# Patient Record
Sex: FEMALE | Race: WHITE | NOT HISPANIC OR LATINO | Employment: OTHER | URBAN - METROPOLITAN AREA
[De-identification: names, ages, dates, MRNs, and addresses within clinical notes are randomized per-mention and may not be internally consistent; named-entity substitution may affect disease eponyms.]

---

## 2017-02-01 ENCOUNTER — LAB VISIT (OUTPATIENT)
Dept: LAB | Facility: HOSPITAL | Age: 32
End: 2017-02-01
Attending: OBSTETRICS & GYNECOLOGY
Payer: MEDICAID

## 2017-02-01 DIAGNOSIS — Z34.81 PRENATAL CARE, SUBSEQUENT PREGNANCY, FIRST TRIMESTER: ICD-10-CM

## 2017-02-01 DIAGNOSIS — Z3A.01 LESS THAN 8 WEEKS GESTATION OF PREGNANCY: Primary | ICD-10-CM

## 2017-02-01 LAB — HCG INTACT+B SERPL-ACNC: 19 MIU/ML

## 2017-02-01 PROCEDURE — 84702 CHORIONIC GONADOTROPIN TEST: CPT

## 2017-02-01 PROCEDURE — 36415 COLL VENOUS BLD VENIPUNCTURE: CPT

## 2017-07-15 ENCOUNTER — NURSE TRIAGE (OUTPATIENT)
Dept: ADMINISTRATIVE | Facility: CLINIC | Age: 32
End: 2017-07-15

## 2017-07-15 NOTE — TELEPHONE ENCOUNTER
Reason for Disposition   Boil > 2 inches across (> 5 cm; larger than a golf ball or ping pong ball)    Answer Assessment - Initial Assessment Questions  Caller states she was seen at [facility] for boil 2 weeks ago.  She has completed antibiotic therapy and boil was lanced .  States there is still lump at the site.  Denies any fever, drainage, severe pain, or redness.  Recommend to be seen in urgent care tomorrow.    Protocols used: ST BOIL (SKIN ABSCESS)-A-

## 2017-07-16 ENCOUNTER — HOSPITAL ENCOUNTER (EMERGENCY)
Facility: HOSPITAL | Age: 32
Discharge: LEFT BEFORE DISCHARGE | End: 2017-07-16
Payer: MEDICAID

## 2017-07-16 ENCOUNTER — HOSPITAL ENCOUNTER (EMERGENCY)
Facility: OTHER | Age: 32
Discharge: HOME | End: 2017-07-16
Attending: INTERNAL MEDICINE
Payer: MEDICAID

## 2017-07-16 ENCOUNTER — NURSE TRIAGE (OUTPATIENT)
Dept: ADMINISTRATIVE | Facility: CLINIC | Age: 32
End: 2017-07-16

## 2017-07-16 VITALS
HEART RATE: 78 BPM | RESPIRATION RATE: 17 BRPM | WEIGHT: 155 LBS | HEIGHT: 62 IN | TEMPERATURE: 98 F | SYSTOLIC BLOOD PRESSURE: 132 MMHG | OXYGEN SATURATION: 98 % | DIASTOLIC BLOOD PRESSURE: 82 MMHG | BODY MASS INDEX: 28.52 KG/M2

## 2017-07-16 VITALS
HEIGHT: 62 IN | WEIGHT: 155 LBS | DIASTOLIC BLOOD PRESSURE: 80 MMHG | OXYGEN SATURATION: 95 % | RESPIRATION RATE: 18 BRPM | HEART RATE: 68 BPM | BODY MASS INDEX: 28.52 KG/M2 | SYSTOLIC BLOOD PRESSURE: 127 MMHG | TEMPERATURE: 98 F

## 2017-07-16 DIAGNOSIS — K08.89 TOOTHACHE: Primary | ICD-10-CM

## 2017-07-16 LAB
B-HCG UR QL: NEGATIVE
CTP QC/QA: YES

## 2017-07-16 PROCEDURE — 99283 EMERGENCY DEPT VISIT LOW MDM: CPT

## 2017-07-16 PROCEDURE — 99900041 HC LEFT WITHOUT BEING SEEN- EMERGENCY

## 2017-07-16 PROCEDURE — 81025 URINE PREGNANCY TEST: CPT | Performed by: EMERGENCY MEDICINE

## 2017-07-16 RX ORDER — IBUPROFEN 800 MG/1
800 TABLET ORAL EVERY 8 HOURS PRN
Qty: 30 TABLET | Refills: 0 | Status: SHIPPED | OUTPATIENT
Start: 2017-07-16 | End: 2018-10-13 | Stop reason: HOSPADM

## 2017-07-16 RX ORDER — AMOXICILLIN 500 MG/1
500 TABLET, FILM COATED ORAL 2 TIMES DAILY
Qty: 20 TABLET | Refills: 0 | Status: SHIPPED | OUTPATIENT
Start: 2017-07-16 | End: 2018-10-12

## 2017-07-16 NOTE — TELEPHONE ENCOUNTER
"    Reason for Disposition   [1] SEVERE pain (e.g., excruciating, unable to do any normal activities) AND [2] not improved 2 hours after pain medicine    Answer Assessment - Initial Assessment Questions  1. LOCATION: "Which tooth is hurting?"  (e.g., right-side/left-side, upper/lower, front/back)      Right upper back  2. ONSET: "When did the toothache start?"  (e.g., hours, days)       A week  3. SEVERITY: "How bad is the toothache?"  (Scale 1-10; mild, moderate or severe)    - MILD (1-3): doesn't interfere with chewing     - MODERATE (4-7): interferes with chewing, interferes with normal activities, awakens from sleep      - SEVERE (8-10): unable to eat, unable to do any normal activities, excruciating pain         Severe 10/10 Ibuprofen   4. SWELLING: "Is there any visible swelling of your face?"      yes  5. OTHER SYMPTOMS: "Do you have any other symptoms?" (e.g., fever)      denies  6. PREGNANCY: "Is there any chance you are pregnant?" "When was your last menstrual period?"      Na    Protocols used: ST TOOTHACHE-A-      "

## 2017-07-16 NOTE — ED PROVIDER NOTES
"Encounter Date: 7/16/2017       History     Chief Complaint   Patient presents with    Dental Pain     right upper molar pain x 1 week, gums swollen and tender around tooth     32-year-old female with right upper molar pain ×1 week      The history is provided by the patient. No  was used.   Dental Pain   The primary symptoms include mouth pain. The symptoms began several days ago. The symptoms are unchanged. The symptoms are new. The symptoms occur frequently.   Mouth pain began 5 - 7 days ago. Mouth pain occurs intermittently. Affected locations include: teeth.     Review of patient's allergies indicates:   Allergen Reactions    Fioricet [butalbital-acetaminophen-caff] Other (See Comments)     Reaction:  "Really bad yeast infections"    Morphine Other (See Comments)     headache    Reglan [metoclopramide] Rash     Past Medical History:   Diagnosis Date    Anxiety     Bladder infection     Carpal tunnel syndrome     Depression     Hypertension     Kidney infection     Pancreatitis     UTI (lower urinary tract infection)      Past Surgical History:   Procedure Laterality Date    CHOLECYSTECTOMY      KNEE SURGERY   aug 2008    right knee    KNEE SURGERY      REDUCTION MAMMAPLASTY      Breast reduction     Family History   Problem Relation Age of Onset    Hypertension Mother     Hypertension Father     Hypertension Maternal Grandmother     Breast cancer Neg Hx     Colon cancer Neg Hx     Ovarian cancer Neg Hx      Social History   Substance Use Topics    Smoking status: Current Every Day Smoker     Packs/day: 1.00     Years: 8.00     Types: Cigarettes    Smokeless tobacco: Never Used      Comment: Pt states she smokes 1 pack of cigarettes in 3 days.    Alcohol use No     Review of Systems   HENT: Positive for dental problem.    All other systems reviewed and are negative.      Physical Exam     Initial Vitals [07/16/17 0351]   BP Pulse Resp Temp SpO2   132/82 78 17 98 °F " (36.7 °C) 98 %      MAP       98.67         Physical Exam    Nursing note and vitals reviewed.  Constitutional: She appears well-developed and well-nourished.   HENT:   Head: Normocephalic.   Mouth/Throat: Dental caries present.   Eyes: Conjunctivae and EOM are normal.   Neck: Normal range of motion.   Cardiovascular: Normal rate and regular rhythm.   Pulmonary/Chest: No respiratory distress.   Abdominal: Soft.   Musculoskeletal: Normal range of motion.   Neurological: She is alert. She has normal strength.   Skin: Skin is warm.   Psychiatric: She has a normal mood and affect.         ED Course   Procedures  Labs Reviewed - No data to display          Medical Decision Making:   Initial Assessment:   32-year-old female with right upper molar pain ×1 week  Differential Diagnosis:   Toothache  ED Management:  Patient given instructions for toothache and a prescription for ibuprofen and amoxicillin                   ED Course     Clinical Impression:   The primary diagnosis is toothache  Disposition:   Disposition: Discharged  Condition: Stable                        Jonathan Ramos MD  07/16/17 0429

## 2017-12-27 ENCOUNTER — HOSPITAL ENCOUNTER (EMERGENCY)
Facility: HOSPITAL | Age: 32
Discharge: HOME | End: 2017-12-28
Attending: EMERGENCY MEDICINE
Payer: MEDICAID

## 2017-12-27 DIAGNOSIS — M54.2 NECK PAIN: ICD-10-CM

## 2017-12-27 DIAGNOSIS — N39.0 URINARY TRACT INFECTION WITHOUT HEMATURIA, SITE UNSPECIFIED: ICD-10-CM

## 2017-12-27 DIAGNOSIS — R51.9 NONINTRACTABLE HEADACHE, UNSPECIFIED CHRONICITY PATTERN, UNSPECIFIED HEADACHE TYPE: Primary | ICD-10-CM

## 2017-12-27 LAB
B-HCG UR QL: NEGATIVE
CTP QC/QA: YES

## 2017-12-27 PROCEDURE — 25000003 PHARM REV CODE 250: Performed by: PHYSICIAN ASSISTANT

## 2017-12-27 PROCEDURE — 96375 TX/PRO/DX INJ NEW DRUG ADDON: CPT

## 2017-12-27 PROCEDURE — 80053 COMPREHEN METABOLIC PANEL: CPT

## 2017-12-27 PROCEDURE — 96372 THER/PROPH/DIAG INJ SC/IM: CPT

## 2017-12-27 PROCEDURE — 96376 TX/PRO/DX INJ SAME DRUG ADON: CPT

## 2017-12-27 PROCEDURE — 81025 URINE PREGNANCY TEST: CPT | Performed by: EMERGENCY MEDICINE

## 2017-12-27 PROCEDURE — 85025 COMPLETE CBC W/AUTO DIFF WBC: CPT

## 2017-12-27 PROCEDURE — 93010 ELECTROCARDIOGRAM REPORT: CPT | Mod: ,,, | Performed by: INTERNAL MEDICINE

## 2017-12-27 PROCEDURE — 63600175 PHARM REV CODE 636 W HCPCS: Performed by: PHYSICIAN ASSISTANT

## 2017-12-27 PROCEDURE — 99284 EMERGENCY DEPT VISIT MOD MDM: CPT | Mod: 25

## 2017-12-27 PROCEDURE — 96374 THER/PROPH/DIAG INJ IV PUSH: CPT

## 2017-12-27 PROCEDURE — 93005 ELECTROCARDIOGRAM TRACING: CPT

## 2017-12-27 PROCEDURE — 84484 ASSAY OF TROPONIN QUANT: CPT

## 2017-12-27 PROCEDURE — 96361 HYDRATE IV INFUSION ADD-ON: CPT

## 2017-12-27 RX ORDER — DIPHENHYDRAMINE HYDROCHLORIDE 50 MG/ML
25 INJECTION INTRAMUSCULAR; INTRAVENOUS
Status: COMPLETED | OUTPATIENT
Start: 2017-12-27 | End: 2017-12-27

## 2017-12-27 RX ORDER — PROCHLORPERAZINE EDISYLATE 5 MG/ML
10 INJECTION INTRAMUSCULAR; INTRAVENOUS
Status: COMPLETED | OUTPATIENT
Start: 2017-12-27 | End: 2017-12-27

## 2017-12-27 RX ORDER — KETOROLAC TROMETHAMINE 30 MG/ML
15 INJECTION, SOLUTION INTRAMUSCULAR; INTRAVENOUS
Status: COMPLETED | OUTPATIENT
Start: 2017-12-27 | End: 2017-12-27

## 2017-12-27 RX ADMIN — KETOROLAC TROMETHAMINE 15 MG: 30 INJECTION, SOLUTION INTRAMUSCULAR at 11:12

## 2017-12-27 RX ADMIN — DIPHENHYDRAMINE HYDROCHLORIDE 25 MG: 50 INJECTION, SOLUTION INTRAMUSCULAR; INTRAVENOUS at 11:12

## 2017-12-27 RX ADMIN — PROCHLORPERAZINE EDISYLATE 10 MG: 5 INJECTION INTRAMUSCULAR; INTRAVENOUS at 11:12

## 2017-12-27 RX ADMIN — SODIUM CHLORIDE 1000 ML: 0.9 INJECTION, SOLUTION INTRAVENOUS at 11:12

## 2017-12-28 VITALS
TEMPERATURE: 98 F | DIASTOLIC BLOOD PRESSURE: 86 MMHG | WEIGHT: 152 LBS | HEART RATE: 66 BPM | OXYGEN SATURATION: 98 % | RESPIRATION RATE: 18 BRPM | HEIGHT: 62 IN | SYSTOLIC BLOOD PRESSURE: 143 MMHG | BODY MASS INDEX: 27.97 KG/M2

## 2017-12-28 LAB
ALBUMIN SERPL BCP-MCNC: 4.3 G/DL
ALP SERPL-CCNC: 83 U/L
ALT SERPL W/O P-5'-P-CCNC: 43 U/L
ANION GAP SERPL CALC-SCNC: 11 MMOL/L
AST SERPL-CCNC: 30 U/L
BACTERIA #/AREA URNS HPF: ABNORMAL /HPF
BASOPHILS # BLD AUTO: 0.03 K/UL
BASOPHILS NFR BLD: 0.3 %
BILIRUB SERPL-MCNC: 0.3 MG/DL
BILIRUB UR QL STRIP: NEGATIVE
BUN SERPL-MCNC: 15 MG/DL
CALCIUM SERPL-MCNC: 10.2 MG/DL
CHLORIDE SERPL-SCNC: 102 MMOL/L
CLARITY UR: ABNORMAL
CO2 SERPL-SCNC: 24 MMOL/L
COLOR UR: YELLOW
CREAT SERPL-MCNC: 0.8 MG/DL
DIFFERENTIAL METHOD: ABNORMAL
EOSINOPHIL # BLD AUTO: 0.3 K/UL
EOSINOPHIL NFR BLD: 2.9 %
ERYTHROCYTE [DISTWIDTH] IN BLOOD BY AUTOMATED COUNT: 12.4 %
EST. GFR  (AFRICAN AMERICAN): >60 ML/MIN/1.73 M^2
EST. GFR  (NON AFRICAN AMERICAN): >60 ML/MIN/1.73 M^2
GLUCOSE SERPL-MCNC: 113 MG/DL
GLUCOSE UR QL STRIP: NEGATIVE
HCT VFR BLD AUTO: 41.5 %
HGB BLD-MCNC: 14.5 G/DL
HGB UR QL STRIP: NEGATIVE
KETONES UR QL STRIP: NEGATIVE
LEUKOCYTE ESTERASE UR QL STRIP: NEGATIVE
LYMPHOCYTES # BLD AUTO: 4.9 K/UL
LYMPHOCYTES NFR BLD: 42.9 %
MCH RBC QN AUTO: 31.1 PG
MCHC RBC AUTO-ENTMCNC: 34.9 G/DL
MCV RBC AUTO: 89 FL
MICROSCOPIC COMMENT: ABNORMAL
MONOCYTES # BLD AUTO: 0.6 K/UL
MONOCYTES NFR BLD: 5.2 %
NEUTROPHILS # BLD AUTO: 5.6 K/UL
NEUTROPHILS NFR BLD: 48.7 %
NITRITE UR QL STRIP: POSITIVE
PH UR STRIP: 5 [PH] (ref 5–8)
PLATELET # BLD AUTO: 283 K/UL
PMV BLD AUTO: 8.7 FL
POTASSIUM SERPL-SCNC: 3.9 MMOL/L
PROT SERPL-MCNC: 8 G/DL
PROT UR QL STRIP: NEGATIVE
RBC # BLD AUTO: 4.66 M/UL
RBC #/AREA URNS HPF: 1 /HPF (ref 0–4)
SODIUM SERPL-SCNC: 137 MMOL/L
SP GR UR STRIP: 1.01 (ref 1–1.03)
SQUAMOUS #/AREA URNS HPF: ABNORMAL /HPF
TROPONIN I SERPL DL<=0.01 NG/ML-MCNC: <0.006 NG/ML
URN SPEC COLLECT METH UR: ABNORMAL
UROBILINOGEN UR STRIP-ACNC: NEGATIVE EU/DL
WBC # BLD AUTO: 11.44 K/UL
WBC #/AREA URNS HPF: 2 /HPF (ref 0–5)
WBC CLUMPS URNS QL MICRO: ABNORMAL

## 2017-12-28 PROCEDURE — 63600175 PHARM REV CODE 636 W HCPCS: Performed by: PHYSICIAN ASSISTANT

## 2017-12-28 PROCEDURE — 87077 CULTURE AEROBIC IDENTIFY: CPT

## 2017-12-28 PROCEDURE — 81000 URINALYSIS NONAUTO W/SCOPE: CPT

## 2017-12-28 PROCEDURE — 87088 URINE BACTERIA CULTURE: CPT

## 2017-12-28 PROCEDURE — 87186 SC STD MICRODIL/AGAR DIL: CPT

## 2017-12-28 PROCEDURE — 87086 URINE CULTURE/COLONY COUNT: CPT

## 2017-12-28 RX ORDER — ETODOLAC 200 MG/1
200 CAPSULE ORAL 3 TIMES DAILY PRN
Qty: 20 CAPSULE | Refills: 0 | Status: SHIPPED | OUTPATIENT
Start: 2017-12-28 | End: 2018-01-04

## 2017-12-28 RX ORDER — CEFTRIAXONE 1 G/1
1 INJECTION, POWDER, FOR SOLUTION INTRAMUSCULAR; INTRAVENOUS
Status: COMPLETED | OUTPATIENT
Start: 2017-12-28 | End: 2017-12-28

## 2017-12-28 RX ORDER — CEPHALEXIN 500 MG/1
500 CAPSULE ORAL EVERY 12 HOURS
Qty: 20 CAPSULE | Refills: 0 | Status: SHIPPED | OUTPATIENT
Start: 2017-12-28 | End: 2018-01-07

## 2017-12-28 RX ORDER — ONDANSETRON 4 MG/1
4 TABLET, ORALLY DISINTEGRATING ORAL EVERY 6 HOURS PRN
Qty: 15 TABLET | Refills: 0 | Status: SHIPPED | OUTPATIENT
Start: 2017-12-28 | End: 2018-01-02

## 2017-12-28 RX ORDER — KETOROLAC TROMETHAMINE 30 MG/ML
15 INJECTION, SOLUTION INTRAMUSCULAR; INTRAVENOUS
Status: COMPLETED | OUTPATIENT
Start: 2017-12-28 | End: 2017-12-28

## 2017-12-28 RX ADMIN — KETOROLAC TROMETHAMINE 15 MG: 30 INJECTION, SOLUTION INTRAMUSCULAR at 01:12

## 2017-12-28 RX ADMIN — CEFTRIAXONE SODIUM 1 G: 1 INJECTION, POWDER, FOR SOLUTION INTRAMUSCULAR; INTRAVENOUS at 01:12

## 2017-12-28 NOTE — DISCHARGE INSTRUCTIONS
Please take Lodine as prescribed for headache and Zofran for nausea.     Take full course of Keflex as prescribed for UTI.     Follow up with primary care and Neurology.     Return to ER for worsening symptoms, new symptoms or as needed.

## 2017-12-28 NOTE — ED PROVIDER NOTES
"Encounter Date: 12/27/2017    SCRIBE #1 NOTE: I, Zainab Goldstein, am scribing for, and in the presence of,  Yaima Newton PA-C. I have scribed the following portions of the note - Other sections scribed: HPI.       History     Chief Complaint   Patient presents with    Headache     pt reports having "migrane" since yesterday; pt also c/o pain to the base of the neck and "in between the shoulder blades"; pt reports hx of migranes and states that she took Tylenol around 2pm today     CC: Headache    HPI: This 32 y.o. female with a medical history of HTN and migraine headaches presents to the ED c/o an acute generalized headache with associated photophobia and phonophobia that began at about 1:00 pm yesterday. Pt reports that the symptoms feel "like somebodies squeezing my head" as she describes the pain as constant and severe (9/10). She states that the symptoms are similar to migraine headaches that she has experienced in the past, but notes that the present headache is accompanied by posterior neck pain, left sided neck stiffness, pain between the bilateral shoulder blades and several episodes of emesis, which is unusual for her. She reports taking Ibuprofen as well as Tylenol (taken at 2:00 pm today) for treatment. Pt notes that she is followed by her PCP, Dr. Kyle Rivera, for the migraine headaches and adds that she is allergic to many migraine medications. She reports that she is also experiencing "a little" non-productive cough (yesterday). Pt denies head trauma, fever, chills, nasal congestion, ear pain, sore throat, chest pain, shortness of breath and diarrhea. She also denies history of neck issues or injuries. No other associated symptoms.       The history is provided by the patient. No  was used.     Review of patient's allergies indicates:   Allergen Reactions    Fioricet [butalbital-acetaminophen-caff] Other (See Comments)     Reaction:  "Really bad yeast infections"    " Morphine Other (See Comments)     headache    Reglan [metoclopramide] Rash     Past Medical History:   Diagnosis Date    Anxiety     Bladder infection     Carpal tunnel syndrome     Depression     Hypertension     Kidney infection     Pancreatitis     UTI (lower urinary tract infection)      Past Surgical History:   Procedure Laterality Date    CHOLECYSTECTOMY      KNEE SURGERY   aug 2008    right knee    KNEE SURGERY      REDUCTION MAMMAPLASTY      Breast reduction     Family History   Problem Relation Age of Onset    Hypertension Mother     Hypertension Father     Hypertension Maternal Grandmother     Breast cancer Neg Hx     Colon cancer Neg Hx     Ovarian cancer Neg Hx      Social History   Substance Use Topics    Smoking status: Current Every Day Smoker     Packs/day: 1.00     Years: 8.00     Types: Cigarettes    Smokeless tobacco: Never Used      Comment: Pt states she smokes 1 pack of cigarettes in 3 days.    Alcohol use No     Review of Systems   Constitutional: Negative for chills and fever.   HENT: Negative for congestion, ear pain, rhinorrhea and sore throat.         (+) phonophobia   Eyes: Positive for photophobia.   Respiratory: Positive for cough. Negative for shortness of breath.    Cardiovascular: Negative for chest pain.   Gastrointestinal: Positive for nausea and vomiting. Negative for abdominal pain and diarrhea.   Musculoskeletal: Positive for neck pain (posterior) and neck stiffness (left sided).        (+) pain between the bilateral shoulder blades   Skin: Negative for rash.   Neurological: Positive for headaches.       Physical Exam     Initial Vitals [12/27/17 2032]   BP Pulse Resp Temp SpO2   (!) 145/102 108 16 98.1 °F (36.7 °C) 98 %      MAP       116.33         Physical Exam    Nursing note and vitals reviewed.  Constitutional: She appears well-developed and well-nourished.   HENT:   Head: Normocephalic.   Right Ear: External ear normal.   Left Ear: External ear  normal.   Nose: Nose normal.   Mouth/Throat: Oropharynx is clear and moist.   Eyes: Conjunctivae are normal.   Neck: Neck supple. No Brudzinski's sign and no Kernig's sign noted.   TTP to left paraspinal cervical musculature   Cardiovascular: Normal rate and regular rhythm. Exam reveals no gallop and no friction rub.    No murmur heard.  Pulmonary/Chest: Breath sounds normal. She has no wheezes. She has no rhonchi. She has no rales.   Abdominal: Soft. Bowel sounds are normal. She exhibits no distension. There is no tenderness. There is no rebound, no guarding, no tenderness at McBurney's point and negative Marrero's sign.   Musculoskeletal: Normal range of motion.   Lymphadenopathy:     She has no cervical adenopathy.   Neurological: She is alert. She has normal strength. No cranial nerve deficit or sensory deficit. She displays a negative Romberg sign. Gait normal.   Skin: Skin is warm and dry.   Psychiatric: She has a normal mood and affect.         ED Course   Procedures  Labs Reviewed   CBC W/ AUTO DIFFERENTIAL - Abnormal; Notable for the following:        Result Value    MCH 31.1 (*)     MPV 8.7 (*)     Lymph # 4.9 (*)     All other components within normal limits   COMPREHENSIVE METABOLIC PANEL - Abnormal; Notable for the following:     Glucose 113 (*)     All other components within normal limits   URINALYSIS - Abnormal; Notable for the following:     Appearance, UA Hazy (*)     Nitrite, UA Positive (*)     All other components within normal limits   URINALYSIS MICROSCOPIC - Abnormal; Notable for the following:     WBC Clumps, UA Occasional (*)     Bacteria, UA Many (*)     All other components within normal limits   CULTURE, URINE   TROPONIN I   POCT URINE PREGNANCY             Medical Decision Making:   Initial Assessment:   Patient is a 32-year-old female with history of migraine headache managed by primary care, Dr. Rivera, who presents for evaluation of constant generalized headache with associated  posterior neck pain radiating between her shoulder blades, NV, photophobia and phonophobia since yesterday at 1 PM.  Patient denies trauma.  Patient is not similar to her normal migraine headaches due to severity, duration and presence of neck pain and pain between shoulder blades.Patient reports she has not attempted any treatment for her symptoms other than Tylenol.  However, upon physical exam, I asked patient about remaining adhesive on her stomach from what appeared to be from previous EKG.  Patient admits she was seen at [facility] yesterday and given Zofran and IV fluids without relief of her headache.  Patient is afebrile, well-appearing in no acute distress.  Physical exam findings detailed above.  There are no menin geal signs.  No focal neurologic deficits.  Considered but doubt intracranial bleed or mass. Cardiac workup unremarkable. Patient was given IV Benadryl, Toradol and Compazine in the ED.   Patient also has UTI and given Rocephin in the ED.  Urine culture sent.  Patient discharged home in stable condition with Keflex for UTI, Lodine and Zofran for symptomatic treatment of her headache.  PCP and neurology follow-up.  ER return precautions discussed with worsening symptoms, new symptoms or as needed.  Discussed this patient with Dr. Mitchell who agrees with the assessment and plan.              Scribe Attestation:   Scribe #1: I performed the above scribed service and the documentation accurately describes the services I performed. I attest to the accuracy of the note.    Attending Attestation:           Physician Attestation for Scribe:  Physician Attestation Statement for Scribe #1: I, Yaima Newton PA-C, reviewed documentation, as scribed by Zainab Goldstein in my presence, and it is both accurate and complete.                 ED Course      Clinical Impression:   The primary encounter diagnosis was Nonintractable headache, unspecified chronicity pattern, unspecified headache  type. Diagnoses of Neck pain and Urinary tract infection without hematuria, site unspecified were also pertinent to this visit.                           Yaima Newton PA-C  12/28/17 2009

## 2017-12-28 NOTE — ED TRIAGE NOTES
Pt resents to Ed with c/o migrane and vomiting since yesterday. Reports taking tylenol with no relief.

## 2017-12-30 LAB — BACTERIA UR CULT: NORMAL

## 2018-03-05 ENCOUNTER — HOSPITAL ENCOUNTER (EMERGENCY)
Facility: HOSPITAL | Age: 33
Discharge: HOME | End: 2018-03-05
Attending: EMERGENCY MEDICINE
Payer: MEDICAID

## 2018-03-05 VITALS
RESPIRATION RATE: 18 BRPM | DIASTOLIC BLOOD PRESSURE: 62 MMHG | HEIGHT: 62 IN | BODY MASS INDEX: 28.52 KG/M2 | SYSTOLIC BLOOD PRESSURE: 122 MMHG | WEIGHT: 155 LBS | TEMPERATURE: 98 F | HEART RATE: 76 BPM | OXYGEN SATURATION: 99 %

## 2018-03-05 DIAGNOSIS — R10.13 EPIGASTRIC PAIN: ICD-10-CM

## 2018-03-05 DIAGNOSIS — R51.9 NONINTRACTABLE HEADACHE, UNSPECIFIED CHRONICITY PATTERN, UNSPECIFIED HEADACHE TYPE: Primary | ICD-10-CM

## 2018-03-05 LAB
ALBUMIN SERPL BCP-MCNC: 3.9 G/DL
ALP SERPL-CCNC: 99 U/L
ALT SERPL W/O P-5'-P-CCNC: 31 U/L
ANION GAP SERPL CALC-SCNC: 14 MMOL/L
AST SERPL-CCNC: 24 U/L
B-HCG UR QL: NEGATIVE
BACTERIA #/AREA URNS HPF: ABNORMAL /HPF
BASOPHILS # BLD AUTO: 0.09 K/UL
BASOPHILS NFR BLD: 0.6 %
BILIRUB SERPL-MCNC: 0.2 MG/DL
BILIRUB UR QL STRIP: NEGATIVE
BUN SERPL-MCNC: 15 MG/DL
CALCIUM SERPL-MCNC: 9.3 MG/DL
CHLORIDE SERPL-SCNC: 103 MMOL/L
CLARITY UR: ABNORMAL
CO2 SERPL-SCNC: 21 MMOL/L
COLOR UR: YELLOW
CREAT SERPL-MCNC: 0.8 MG/DL
CTP QC/QA: YES
DIFFERENTIAL METHOD: ABNORMAL
EOSINOPHIL # BLD AUTO: 0.3 K/UL
EOSINOPHIL NFR BLD: 1.7 %
ERYTHROCYTE [DISTWIDTH] IN BLOOD BY AUTOMATED COUNT: 12.5 %
EST. GFR  (AFRICAN AMERICAN): >60 ML/MIN/1.73 M^2
EST. GFR  (NON AFRICAN AMERICAN): >60 ML/MIN/1.73 M^2
GLUCOSE SERPL-MCNC: 125 MG/DL
GLUCOSE UR QL STRIP: NEGATIVE
HCT VFR BLD AUTO: 42.1 %
HGB BLD-MCNC: 14.6 G/DL
HGB UR QL STRIP: ABNORMAL
HYALINE CASTS #/AREA URNS LPF: 4 /LPF
KETONES UR QL STRIP: NEGATIVE
LEUKOCYTE ESTERASE UR QL STRIP: NEGATIVE
LIPASE SERPL-CCNC: 8 U/L
LYMPHOCYTES # BLD AUTO: 5 K/UL
LYMPHOCYTES NFR BLD: 30.9 %
MCH RBC QN AUTO: 31.1 PG
MCHC RBC AUTO-ENTMCNC: 34.7 G/DL
MCV RBC AUTO: 90 FL
MICROSCOPIC COMMENT: ABNORMAL
MONOCYTES # BLD AUTO: 1.3 K/UL
MONOCYTES NFR BLD: 8.1 %
NEUTROPHILS # BLD AUTO: 9.4 K/UL
NEUTROPHILS NFR BLD: 57.6 %
NITRITE UR QL STRIP: NEGATIVE
OTHER ELEMENTS URNS MICRO: ABNORMAL
PH UR STRIP: 5 [PH] (ref 5–8)
PLATELET # BLD AUTO: 310 K/UL
PMV BLD AUTO: 9.1 FL
POTASSIUM SERPL-SCNC: 3.9 MMOL/L
PROT SERPL-MCNC: 7.9 G/DL
PROT UR QL STRIP: ABNORMAL
RBC # BLD AUTO: 4.69 M/UL
RBC #/AREA URNS HPF: 3 /HPF (ref 0–4)
SODIUM SERPL-SCNC: 138 MMOL/L
SP GR UR STRIP: 1.03 (ref 1–1.03)
URN SPEC COLLECT METH UR: ABNORMAL
UROBILINOGEN UR STRIP-ACNC: NEGATIVE EU/DL
WBC # BLD AUTO: 16.29 K/UL
WBC #/AREA URNS HPF: 2 /HPF (ref 0–5)

## 2018-03-05 PROCEDURE — 63600175 PHARM REV CODE 636 W HCPCS: Performed by: PHYSICIAN ASSISTANT

## 2018-03-05 PROCEDURE — 96375 TX/PRO/DX INJ NEW DRUG ADDON: CPT

## 2018-03-05 PROCEDURE — 80053 COMPREHEN METABOLIC PANEL: CPT

## 2018-03-05 PROCEDURE — 85025 COMPLETE CBC W/AUTO DIFF WBC: CPT

## 2018-03-05 PROCEDURE — 99284 EMERGENCY DEPT VISIT MOD MDM: CPT | Mod: 25

## 2018-03-05 PROCEDURE — 25000003 PHARM REV CODE 250: Performed by: PHYSICIAN ASSISTANT

## 2018-03-05 PROCEDURE — 81025 URINE PREGNANCY TEST: CPT | Performed by: PHYSICIAN ASSISTANT

## 2018-03-05 PROCEDURE — 96376 TX/PRO/DX INJ SAME DRUG ADON: CPT

## 2018-03-05 PROCEDURE — 96374 THER/PROPH/DIAG INJ IV PUSH: CPT

## 2018-03-05 PROCEDURE — 87086 URINE CULTURE/COLONY COUNT: CPT

## 2018-03-05 PROCEDURE — 96361 HYDRATE IV INFUSION ADD-ON: CPT

## 2018-03-05 PROCEDURE — 83690 ASSAY OF LIPASE: CPT

## 2018-03-05 PROCEDURE — 81000 URINALYSIS NONAUTO W/SCOPE: CPT

## 2018-03-05 RX ORDER — HYDROMORPHONE HYDROCHLORIDE 1 MG/ML
0.5 INJECTION, SOLUTION INTRAMUSCULAR; INTRAVENOUS; SUBCUTANEOUS
Status: COMPLETED | OUTPATIENT
Start: 2018-03-05 | End: 2018-03-05

## 2018-03-05 RX ORDER — DIPHENHYDRAMINE HYDROCHLORIDE 50 MG/ML
25 INJECTION INTRAMUSCULAR; INTRAVENOUS
Status: COMPLETED | OUTPATIENT
Start: 2018-03-05 | End: 2018-03-05

## 2018-03-05 RX ORDER — ONDANSETRON 2 MG/ML
4 INJECTION INTRAMUSCULAR; INTRAVENOUS
Status: COMPLETED | OUTPATIENT
Start: 2018-03-05 | End: 2018-03-05

## 2018-03-05 RX ORDER — ONDANSETRON 4 MG/1
4 TABLET, ORALLY DISINTEGRATING ORAL EVERY 12 HOURS PRN
Qty: 10 TABLET | Refills: 0 | Status: SHIPPED | OUTPATIENT
Start: 2018-03-05 | End: 2018-10-12

## 2018-03-05 RX ORDER — DIPHENHYDRAMINE HCL 25 MG
25 CAPSULE ORAL EVERY 6 HOURS PRN
Qty: 20 CAPSULE | Refills: 0 | COMMUNITY
Start: 2018-03-05 | End: 2019-06-22

## 2018-03-05 RX ORDER — PROCHLORPERAZINE EDISYLATE 5 MG/ML
10 INJECTION INTRAMUSCULAR; INTRAVENOUS
Status: COMPLETED | OUTPATIENT
Start: 2018-03-05 | End: 2018-03-05

## 2018-03-05 RX ORDER — KETOROLAC TROMETHAMINE 30 MG/ML
10 INJECTION, SOLUTION INTRAMUSCULAR; INTRAVENOUS
Status: COMPLETED | OUTPATIENT
Start: 2018-03-05 | End: 2018-03-05

## 2018-03-05 RX ORDER — PANTOPRAZOLE SODIUM 40 MG/1
40 TABLET, DELAYED RELEASE ORAL DAILY
Qty: 30 TABLET | Refills: 0 | Status: SHIPPED | OUTPATIENT
Start: 2018-03-05 | End: 2018-11-23 | Stop reason: ALTCHOICE

## 2018-03-05 RX ORDER — PROCHLORPERAZINE MALEATE 10 MG
10 TABLET ORAL EVERY 6 HOURS PRN
Qty: 15 TABLET | Refills: 0 | Status: SHIPPED | OUTPATIENT
Start: 2018-03-05 | End: 2018-10-12

## 2018-03-05 RX ADMIN — KETOROLAC TROMETHAMINE 10 MG: 30 INJECTION, SOLUTION INTRAMUSCULAR at 03:03

## 2018-03-05 RX ADMIN — ONDANSETRON HYDROCHLORIDE 4 MG: 2 INJECTION, SOLUTION INTRAMUSCULAR; INTRAVENOUS at 03:03

## 2018-03-05 RX ADMIN — KETOROLAC TROMETHAMINE 10 MG: 30 INJECTION, SOLUTION INTRAMUSCULAR at 04:03

## 2018-03-05 RX ADMIN — PROCHLORPERAZINE EDISYLATE 10 MG: 5 INJECTION INTRAMUSCULAR; INTRAVENOUS at 03:03

## 2018-03-05 RX ADMIN — DIPHENHYDRAMINE HYDROCHLORIDE 25 MG: 50 INJECTION, SOLUTION INTRAMUSCULAR; INTRAVENOUS at 03:03

## 2018-03-05 RX ADMIN — SODIUM CHLORIDE 1000 ML: 0.9 INJECTION, SOLUTION INTRAVENOUS at 03:03

## 2018-03-05 RX ADMIN — Medication 0.5 MG: at 04:03

## 2018-03-05 NOTE — ED PROVIDER NOTES
"Encounter Date: 3/5/2018       History     Chief Complaint   Patient presents with    Headache     headache for last two days, pt unable to sleep and called ems     This is a 33-year-old female with history of HTN, anxiety, migraines, gastroparesis, pancreatitis, cholecystitis with cholecystectomy who presents with headache ×4 days, and epigastric pain intermittently ×2 weeks with nausea vomiting.  She explains her epigastric pain has been intermittent until the last 4 days it has been constant.  It is not worsened by food or change in position.  She denies diarrhea or change in stool color.  She explains she's had pancreatitis before, possibly secondary to gallstones, and that her epigastric pain feels very similar.  She does not have a gallbladder anymore.  She denies alcohol intake or recent medication change.  Her headache is frontal bilaterally, constant, and nonradiating.  She reports blurry vision at times, but denies neck stiffness, syncope, fever.  Her headache is the same character and location as previous migraine headaches.  She has been taking Aleve with little relief.          Review of patient's allergies indicates:   Allergen Reactions    Fioricet [butalbital-acetaminophen-caff] Other (See Comments)     Reaction:  "Really bad yeast infections"    Reglan [metoclopramide] Rash     Past Medical History:   Diagnosis Date    Anxiety     Bladder infection     Carpal tunnel syndrome     Depression     Hypertension     Pancreatitis     UTI (lower urinary tract infection)      Past Surgical History:   Procedure Laterality Date    CHOLECYSTECTOMY      KNEE SURGERY   aug 2008    right knee    KNEE SURGERY      REDUCTION MAMMAPLASTY      Breast reduction     Family History   Problem Relation Age of Onset    Hypertension Mother     Hypertension Father     Hypertension Maternal Grandmother     Breast cancer Neg Hx     Colon cancer Neg Hx     Ovarian cancer Neg Hx      Social History   Substance " Use Topics    Smoking status: Current Every Day Smoker     Packs/day: 1.00     Years: 8.00     Types: Cigarettes    Smokeless tobacco: Never Used      Comment: Pt states she smokes 1 pack of cigarettes in 3 days.    Alcohol use No     Review of Systems   Constitutional: Negative for fever.   HENT: Negative for hearing loss and sore throat.    Eyes: Positive for visual disturbance.   Respiratory: Negative for cough and shortness of breath.    Cardiovascular: Negative for chest pain.   Gastrointestinal: Positive for abdominal pain, nausea and vomiting. Negative for blood in stool and diarrhea.   Genitourinary: Negative for dysuria and vaginal discharge.   Musculoskeletal: Negative for back pain, neck pain and neck stiffness.   Skin: Negative for rash.   Neurological: Positive for headaches. Negative for dizziness, syncope, speech difficulty and weakness.   Hematological: Does not bruise/bleed easily.       Physical Exam     Initial Vitals [03/05/18 0240]   BP Pulse Resp Temp SpO2   (!) 149/101 110 18 98.2 °F (36.8 °C) 100 %      MAP       117         Physical Exam    Vitals reviewed.  Constitutional: She appears well-developed and well-nourished. She is not diaphoretic. No distress.   HENT:   Head: Normocephalic and atraumatic.   Right Ear: External ear normal.   Left Ear: External ear normal.   Nose: Nose normal.   Eyes: Conjunctivae are normal. No scleral icterus.   Neck: Normal range of motion. Neck supple.   Cardiovascular: Regular rhythm, normal heart sounds and intact distal pulses. Tachycardia present.    Pulmonary/Chest: Breath sounds normal. No respiratory distress. She has no wheezes. She has no rhonchi. She has no rales. She exhibits no tenderness.   Abdominal: Soft. She exhibits no distension and no mass. There is tenderness in the epigastric area. There is no rebound, no guarding, no CVA tenderness, no tenderness at McBurney's point and negative Marrero's sign.   Musculoskeletal: Normal range of motion.    Lymphadenopathy:     She has no cervical adenopathy.   Neurological: She is alert and oriented to person, place, and time.   Skin: Skin is warm and dry. No rash noted.         ED Course   Procedures  Labs Reviewed   CBC W/ AUTO DIFFERENTIAL - Abnormal; Notable for the following:        Result Value    WBC 16.29 (*)     MCH 31.1 (*)     MPV 9.1 (*)     Gran # (ANC) 9.4 (*)     Lymph # 5.0 (*)     Mono # 1.3 (*)     All other components within normal limits   COMPREHENSIVE METABOLIC PANEL - Abnormal; Notable for the following:     CO2 21 (*)     Glucose 125 (*)     All other components within normal limits   URINALYSIS - Abnormal; Notable for the following:     Appearance, UA Hazy (*)     Protein, UA 1+ (*)     Occult Blood UA 1+ (*)     All other components within normal limits   URINALYSIS MICROSCOPIC - Abnormal; Notable for the following:     Bacteria, UA Moderate (*)     Hyaline Casts, UA 4 (*)     All other components within normal limits   CULTURE, URINE   LIPASE   POCT URINE PREGNANCY             Medical Decision Making:   Initial Assessment:   33-year-old female with history of hypertension, anxiety, pancreatitis and cholecystectomy presents with epigastric pain ×2 weeks and headache ×4 days.  She denies fever, syncope, neck stiffness.  She presents well-appearing in no distress, slightly tachycardic and hypertensive.  Afebrile.  Cranial nerves are grossly intact.  HEENT exam is unremarkable.  Abdomen is soft with mild epigastric tenderness.  No peritoneal signs.  ED Management:  Headache and epigastric pain significantly improved with treatment. Lipase, LFTs, renal function, H&H within normal limits. Im not certain of the cause of her elevated WBCs. She does not have convincing evidence for UTI, but will send for cx. Low suspicion for serious intra-abdominal process or meningitis. Pt safe for discharge with symptomatic treatment and close f/u with PCP. Return precautions given. She verbalized understanding  and agreed with plan. Case discussed with Dr. Cox.                       Clinical Impression:   The primary encounter diagnosis was Nonintractable headache, unspecified chronicity pattern, unspecified headache type. A diagnosis of Epigastric pain was also pertinent to this visit.                           Maurice Ng PA-C  03/06/18 0905

## 2018-03-05 NOTE — ED TRIAGE NOTES
Patient presents to the ER via EMS. Patient presents with a headache for x 2 days. Epigastric pain x 2 weeks. Reports vomiting since last night. Denies chest pain or diarrhea. 9/10 pain

## 2018-03-07 LAB — BACTERIA UR CULT: NORMAL

## 2018-10-12 ENCOUNTER — HOSPITAL ENCOUNTER (EMERGENCY)
Facility: HOSPITAL | Age: 33
Discharge: HOME | End: 2018-10-13
Attending: EMERGENCY MEDICINE
Payer: MEDICAID

## 2018-10-12 DIAGNOSIS — R51.9 ACUTE NONINTRACTABLE HEADACHE, UNSPECIFIED HEADACHE TYPE: Primary | ICD-10-CM

## 2018-10-12 DIAGNOSIS — R19.7 VOMITING AND DIARRHEA: ICD-10-CM

## 2018-10-12 DIAGNOSIS — R10.13 EPIGASTRIC PAIN: ICD-10-CM

## 2018-10-12 DIAGNOSIS — R11.10 VOMITING AND DIARRHEA: ICD-10-CM

## 2018-10-12 LAB
B-HCG UR QL: NEGATIVE
BACTERIA #/AREA URNS HPF: NORMAL /HPF
BASOPHILS # BLD AUTO: 0.03 K/UL
BASOPHILS NFR BLD: 0.3 %
BILIRUB UR QL STRIP: NEGATIVE
CLARITY UR: CLEAR
COLOR UR: ABNORMAL
CTP QC/QA: YES
DIFFERENTIAL METHOD: ABNORMAL
EOSINOPHIL # BLD AUTO: 0.4 K/UL
EOSINOPHIL NFR BLD: 3.4 %
ERYTHROCYTE [DISTWIDTH] IN BLOOD BY AUTOMATED COUNT: 12.9 %
GLUCOSE UR QL STRIP: NEGATIVE
HCT VFR BLD AUTO: 41.5 %
HGB BLD-MCNC: 14.7 G/DL
HGB UR QL STRIP: ABNORMAL
KETONES UR QL STRIP: NEGATIVE
LEUKOCYTE ESTERASE UR QL STRIP: NEGATIVE
LYMPHOCYTES # BLD AUTO: 4.9 K/UL
LYMPHOCYTES NFR BLD: 42 %
MCH RBC QN AUTO: 31.2 PG
MCHC RBC AUTO-ENTMCNC: 35.4 G/DL
MCV RBC AUTO: 88 FL
MICROSCOPIC COMMENT: NORMAL
MONOCYTES # BLD AUTO: 0.8 K/UL
MONOCYTES NFR BLD: 6.5 %
NEUTROPHILS # BLD AUTO: 5.6 K/UL
NEUTROPHILS NFR BLD: 47.8 %
NITRITE UR QL STRIP: NEGATIVE
PH UR STRIP: 6 [PH] (ref 5–8)
PLATELET # BLD AUTO: 267 K/UL
PMV BLD AUTO: 9 FL
PROT UR QL STRIP: NEGATIVE
RBC # BLD AUTO: 4.71 M/UL
RBC #/AREA URNS HPF: 1 /HPF (ref 0–4)
SP GR UR STRIP: 1 (ref 1–1.03)
SQUAMOUS #/AREA URNS HPF: 5 /HPF
URN SPEC COLLECT METH UR: ABNORMAL
UROBILINOGEN UR STRIP-ACNC: NEGATIVE EU/DL
WBC # BLD AUTO: 11.69 K/UL
WBC #/AREA URNS HPF: 1 /HPF (ref 0–5)

## 2018-10-12 PROCEDURE — 96374 THER/PROPH/DIAG INJ IV PUSH: CPT

## 2018-10-12 PROCEDURE — 63600175 PHARM REV CODE 636 W HCPCS: Performed by: PHYSICIAN ASSISTANT

## 2018-10-12 PROCEDURE — 99284 EMERGENCY DEPT VISIT MOD MDM: CPT

## 2018-10-12 PROCEDURE — 83690 ASSAY OF LIPASE: CPT

## 2018-10-12 PROCEDURE — 81025 URINE PREGNANCY TEST: CPT | Performed by: PHYSICIAN ASSISTANT

## 2018-10-12 PROCEDURE — 81000 URINALYSIS NONAUTO W/SCOPE: CPT

## 2018-10-12 PROCEDURE — 85025 COMPLETE CBC W/AUTO DIFF WBC: CPT

## 2018-10-12 PROCEDURE — 80053 COMPREHEN METABOLIC PANEL: CPT

## 2018-10-12 PROCEDURE — 25000003 PHARM REV CODE 250: Performed by: PHYSICIAN ASSISTANT

## 2018-10-12 PROCEDURE — 96376 TX/PRO/DX INJ SAME DRUG ADON: CPT

## 2018-10-12 PROCEDURE — 96375 TX/PRO/DX INJ NEW DRUG ADDON: CPT

## 2018-10-12 RX ORDER — PROCHLORPERAZINE EDISYLATE 5 MG/ML
10 INJECTION INTRAMUSCULAR; INTRAVENOUS
Status: DISCONTINUED | OUTPATIENT
Start: 2018-10-12 | End: 2018-10-12

## 2018-10-12 RX ORDER — MORPHINE SULFATE 4 MG/ML
4 INJECTION, SOLUTION INTRAMUSCULAR; INTRAVENOUS
Status: DISCONTINUED | OUTPATIENT
Start: 2018-10-12 | End: 2018-10-12

## 2018-10-12 RX ORDER — ONDANSETRON 2 MG/ML
4 INJECTION INTRAMUSCULAR; INTRAVENOUS
Status: COMPLETED | OUTPATIENT
Start: 2018-10-12 | End: 2018-10-12

## 2018-10-12 RX ORDER — DIPHENHYDRAMINE HCL 25 MG
25 CAPSULE ORAL
Status: DISCONTINUED | OUTPATIENT
Start: 2018-10-12 | End: 2018-10-12

## 2018-10-12 RX ORDER — ACETAMINOPHEN 500 MG
500 TABLET ORAL
Status: COMPLETED | OUTPATIENT
Start: 2018-10-12 | End: 2018-10-12

## 2018-10-12 RX ORDER — MORPHINE SULFATE 4 MG/ML
4 INJECTION, SOLUTION INTRAMUSCULAR; INTRAVENOUS
Status: COMPLETED | OUTPATIENT
Start: 2018-10-12 | End: 2018-10-12

## 2018-10-12 RX ORDER — KETOROLAC TROMETHAMINE 30 MG/ML
15 INJECTION, SOLUTION INTRAMUSCULAR; INTRAVENOUS
Status: COMPLETED | OUTPATIENT
Start: 2018-10-12 | End: 2018-10-12

## 2018-10-12 RX ADMIN — ACETAMINOPHEN 500 MG: 500 TABLET, FILM COATED ORAL at 11:10

## 2018-10-12 RX ADMIN — ONDANSETRON HYDROCHLORIDE 4 MG: 2 INJECTION INTRAMUSCULAR; INTRAVENOUS at 11:10

## 2018-10-12 RX ADMIN — KETOROLAC TROMETHAMINE 15 MG: 30 INJECTION, SOLUTION INTRAMUSCULAR at 11:10

## 2018-10-12 RX ADMIN — MORPHINE SULFATE 4 MG: 4 INJECTION INTRAVENOUS at 11:10

## 2018-10-13 VITALS
WEIGHT: 190 LBS | HEIGHT: 62 IN | RESPIRATION RATE: 16 BRPM | BODY MASS INDEX: 34.96 KG/M2 | HEART RATE: 76 BPM | TEMPERATURE: 98 F | DIASTOLIC BLOOD PRESSURE: 90 MMHG | OXYGEN SATURATION: 99 % | SYSTOLIC BLOOD PRESSURE: 158 MMHG

## 2018-10-13 LAB
ALBUMIN SERPL BCP-MCNC: 4.3 G/DL
ALP SERPL-CCNC: 80 U/L
ALT SERPL W/O P-5'-P-CCNC: 32 U/L
ANION GAP SERPL CALC-SCNC: 12 MMOL/L
AST SERPL-CCNC: 41 U/L
BILIRUB SERPL-MCNC: 0.2 MG/DL
BUN SERPL-MCNC: 15 MG/DL
CALCIUM SERPL-MCNC: 10.1 MG/DL
CHLORIDE SERPL-SCNC: 101 MMOL/L
CO2 SERPL-SCNC: 22 MMOL/L
CREAT SERPL-MCNC: 0.9 MG/DL
EST. GFR  (AFRICAN AMERICAN): >60 ML/MIN/1.73 M^2
EST. GFR  (NON AFRICAN AMERICAN): >60 ML/MIN/1.73 M^2
GLUCOSE SERPL-MCNC: 112 MG/DL
LIPASE SERPL-CCNC: 9 U/L
POTASSIUM SERPL-SCNC: 4.4 MMOL/L
PROT SERPL-MCNC: 8.7 G/DL
SODIUM SERPL-SCNC: 135 MMOL/L

## 2018-10-13 RX ORDER — ETODOLAC 200 MG/1
200 CAPSULE ORAL 3 TIMES DAILY PRN
Qty: 20 CAPSULE | Refills: 0 | Status: SHIPPED | OUTPATIENT
Start: 2018-10-13 | End: 2018-10-20

## 2018-10-13 RX ORDER — ACETAMINOPHEN 500 MG
500 TABLET ORAL EVERY 4 HOURS PRN
Qty: 20 TABLET | Refills: 0 | Status: SHIPPED | OUTPATIENT
Start: 2018-10-13 | End: 2018-10-18

## 2018-10-13 RX ORDER — ONDANSETRON 4 MG/1
4 TABLET, ORALLY DISINTEGRATING ORAL EVERY 6 HOURS PRN
Qty: 15 TABLET | Refills: 0 | Status: SHIPPED | OUTPATIENT
Start: 2018-10-13 | End: 2018-10-18

## 2018-10-13 NOTE — DISCHARGE INSTRUCTIONS
Take Lodine, Tylenol and Zofran for your headaches.     Follow up with primary care for high blood pressure and Neurologist for headaches and GI for abdominal pain in 1-2 days.   Return to ER for worsening symptoms or as needed.

## 2018-10-13 NOTE — ED TRIAGE NOTES
Pt c/o headache pain starting at 6:00 pm. Pt reports nvd. Pt reports sensitive to light and blurred vision.

## 2018-10-13 NOTE — ED PROVIDER NOTES
"Encounter Date: 10/12/2018    SCRIBE #1 NOTE: I, Paula Soto, am scribing for, and in the presence of,  Yaima Newton PA-C. I have scribed the following portions of the note - Other sections scribed: HPI and ROS.       History     Chief Complaint   Patient presents with    Headache     pt c/o headache pain starting at 6:00 pm      CC: Headache    HPI: This 33 y.o female who has Anxiety, Pancreatitis, Hypertension, Depression presents to the ED c/o acute, constant headache that began at 0900 today.  Patient reports waking with the headache described as a pounding and pressure, which she reports as being waxing and waning in severity.  Patient also reports of associated photophobia  She also reports having intermittent blurred vision for the past 2 hours. She also reports of a stabbing epigastric abdominal pain with associated nausea, emesis x3-4, and diarrhea x6-7.Patient denies fever, chills, cough, rhinorrhea, rash, back pain, chest pain, shortness of breath, or any other associated symptoms.  No prior tx.  No alleviating factors.  Patient is currently taking propanolol 60mg BID and was recently started on Lisinopril 12.5 mg last week due to uncontrolled blood pressure.  Today she reports a home blood pressure of 167/115 PTA.  No relief with attempting Ibuprofen.  No alleviating factors.        The history is provided by the patient. No  was used.     Review of patient's allergies indicates:   Allergen Reactions    Fioricet [butalbital-acetaminophen-caff] Other (See Comments)     Reaction:  "Really bad yeast infections"    Reglan [metoclopramide] Rash     Past Medical History:   Diagnosis Date    Anxiety     Bladder infection     Carpal tunnel syndrome     Depression     Hypertension     Pancreatitis     UTI (lower urinary tract infection)      Past Surgical History:   Procedure Laterality Date    CHOLECYSTECTOMY      CHOLECYSTECTOMY, LAPAROSCOPIC N/A 12/21/2012    Performed by " [facility] at [facility] OR    KNEE SURGERY   aug 2008    right knee    KNEE SURGERY      REDUCTION MAMMAPLASTY      Breast reduction     Family History   Problem Relation Age of Onset    Hypertension Mother     Hypertension Father     Hypertension Maternal Grandmother     Breast cancer Neg Hx     Colon cancer Neg Hx     Ovarian cancer Neg Hx      Social History     Tobacco Use    Smoking status: Current Every Day Smoker     Packs/day: 1.00     Years: 8.00     Pack years: 8.00     Types: Cigarettes    Smokeless tobacco: Never Used    Tobacco comment: Pt states she smokes 1 pack of cigarettes in 3 days.   Substance Use Topics    Alcohol use: No    Drug use: No     Review of Systems   Constitutional: Negative for fever.   HENT: Positive for ear pain (R). Negative for congestion, hearing loss, rhinorrhea and sore throat.    Eyes: Positive for photophobia and visual disturbance.   Respiratory: Negative for shortness of breath.    Cardiovascular: Negative for chest pain.   Gastrointestinal: Positive for abdominal pain, diarrhea, nausea and vomiting.   Genitourinary: Negative for dysuria, frequency, hematuria and urgency.   Musculoskeletal: Negative for gait problem.   Skin: Negative for rash.   Neurological: Positive for headaches. Negative for dizziness, facial asymmetry, speech difficulty, weakness, light-headedness and numbness.   Psychiatric/Behavioral: Negative for confusion.       Physical Exam     Initial Vitals [10/12/18 2159]   BP Pulse Resp Temp SpO2   (!) 176/98 80 16 98.2 °F (36.8 °C) 98 %      MAP       --         Physical Exam    Nursing note and vitals reviewed.  Constitutional: She appears well-developed and well-nourished.   HENT:   Head: Normocephalic.   Right Ear: Hearing, tympanic membrane, external ear and ear canal normal.   Left Ear: Hearing, tympanic membrane, external ear and ear canal normal.   Nose: Nose normal. No mucosal edema or rhinorrhea. Right sinus exhibits no maxillary  sinus tenderness and no frontal sinus tenderness. Left sinus exhibits no maxillary sinus tenderness and no frontal sinus tenderness.   Mouth/Throat: Oropharynx is clear and moist. No oropharyngeal exudate.   Eyes: Conjunctivae and EOM are normal. Pupils are equal, round, and reactive to light.   Cardiovascular: Normal rate and regular rhythm. Exam reveals no gallop and no friction rub.    No murmur heard.  Pulmonary/Chest: Breath sounds normal. She has no wheezes. She has no rhonchi. She has no rales.   Abdominal: Soft. Bowel sounds are normal. She exhibits no distension. There is tenderness in the epigastric area. There is no rigidity, no rebound, no guarding, no CVA tenderness, no tenderness at McBurney's point and negative Marrero's sign.   Musculoskeletal: Normal range of motion.   Neurological: She is alert. She has normal strength. No cranial nerve deficit or sensory deficit. She displays a negative Romberg sign. Coordination and gait normal.   Skin: Skin is warm and dry.   Psychiatric: She has a normal mood and affect.         ED Course   Procedures  Labs Reviewed   CBC W/ AUTO DIFFERENTIAL - Abnormal; Notable for the following components:       Result Value    MCH 31.2 (*)     MPV 9.0 (*)     Lymph # 4.9 (*)     All other components within normal limits   COMPREHENSIVE METABOLIC PANEL - Abnormal; Notable for the following components:    Sodium 135 (*)     CO2 22 (*)     Glucose 112 (*)     Total Protein 8.7 (*)     AST 41 (*)     All other components within normal limits   URINALYSIS, REFLEX TO URINE CULTURE - Abnormal; Notable for the following components:    Occult Blood UA 1+ (*)     All other components within normal limits    Narrative:     Preferred Collection Type->Urine, Clean Catch   LIPASE   URINALYSIS MICROSCOPIC    Narrative:     Preferred Collection Type->Urine, Clean Catch   POCT URINE PREGNANCY          Imaging Results    None          Medical Decision Making:   Initial Assessment:   Pt is a 33  "y/o female with history of HTN propranolol 60 mg BID (added lisinopril 12.5 mg started last week) states her BP running 167/115 mmhg. Pt is c/o generalized HA "pounding and pressure" waxing and waning in severity+ photophobia. Reports bilaterally blurry vision intermittently x 2 hours. Started this morning 0900, worsened at 1800. States that she feels that this is similar to HAs she has had previosuly with elevated BP.  Denies similar to migraine HA, trauma, weakness, paresthesias, confusion, dizziness, lightheadedness, CP, SOB. /98. No focal neurologic deficits. Considered but doubt CVA, meningitis. Pt statse she is scheduled to follow up with Neurology soon for HAs. Pt given Toradol and zofran with minimal improvement. Morphine and additional zofran ordered.  Pt additionally c/o epigastric pain constant since 1500 today constant "being stabbed" with associated vomiting 3-4x, diarrhea 6-7 x. Mild epigastric TTP. Lipase within normal limits. Pt is s/p cholecystectomy. No abdominal TTP on repeat exam. Doubt bowel obstruction acute surgical abdomen. Pt seen here before for similar symptoms.  Will have her follow up with GI. Return to ER for worsening symptoms or as needed. Discussed pt with Dr. Alonso who agrees with assessment and plan.             Scribe Attestation:   Scribe #1: I performed the above scribed service and the documentation accurately describes the services I performed. I attest to the accuracy of the note.    Attending Attestation:           Physician Attestation for Scribe:  Physician Attestation Statement for Scribe #1: I, Yaima Newton PA-C, reviewed documentation, as scribed by Paula Soto in my presence, and it is both accurate and complete.                    Clinical Impression:   The primary encounter diagnosis was Acute nonintractable headache, unspecified headache type. Diagnoses of Epigastric pain and Vomiting and diarrhea were also pertinent to this visit.       "                       Yaima Newton PA-C  10/13/18 0031       Yaima Newton PA-C  10/13/18 0031

## 2018-10-27 ENCOUNTER — HOSPITAL ENCOUNTER (EMERGENCY)
Facility: HOSPITAL | Age: 33
Discharge: HOME | End: 2018-10-27
Attending: EMERGENCY MEDICINE
Payer: MEDICAID

## 2018-10-27 VITALS
SYSTOLIC BLOOD PRESSURE: 141 MMHG | TEMPERATURE: 98 F | OXYGEN SATURATION: 97 % | DIASTOLIC BLOOD PRESSURE: 86 MMHG | HEART RATE: 91 BPM | HEIGHT: 62 IN | BODY MASS INDEX: 33.13 KG/M2 | RESPIRATION RATE: 18 BRPM | WEIGHT: 180 LBS

## 2018-10-27 DIAGNOSIS — H93.8X1 EAR FULLNESS, RIGHT: ICD-10-CM

## 2018-10-27 DIAGNOSIS — R51.9 RECURRENT HEADACHE: Primary | ICD-10-CM

## 2018-10-27 LAB
B-HCG UR QL: NEGATIVE
BILIRUB UR QL STRIP: NEGATIVE
CLARITY UR: CLEAR
COLOR UR: ABNORMAL
CTP QC/QA: YES
GLUCOSE UR QL STRIP: NEGATIVE
HGB UR QL STRIP: ABNORMAL
KETONES UR QL STRIP: NEGATIVE
LEUKOCYTE ESTERASE UR QL STRIP: NEGATIVE
MICROSCOPIC COMMENT: NORMAL
NITRITE UR QL STRIP: NEGATIVE
PH UR STRIP: 5 [PH] (ref 5–8)
PROT UR QL STRIP: NEGATIVE
RBC #/AREA URNS HPF: 2 /HPF (ref 0–4)
SP GR UR STRIP: 1.01 (ref 1–1.03)
SQUAMOUS #/AREA URNS HPF: 3 /HPF
URN SPEC COLLECT METH UR: ABNORMAL
UROBILINOGEN UR STRIP-ACNC: NEGATIVE EU/DL

## 2018-10-27 PROCEDURE — 99284 EMERGENCY DEPT VISIT MOD MDM: CPT | Mod: 25

## 2018-10-27 PROCEDURE — 81025 URINE PREGNANCY TEST: CPT | Performed by: PHYSICIAN ASSISTANT

## 2018-10-27 PROCEDURE — 25000003 PHARM REV CODE 250: Performed by: NURSE PRACTITIONER

## 2018-10-27 PROCEDURE — 96375 TX/PRO/DX INJ NEW DRUG ADDON: CPT

## 2018-10-27 PROCEDURE — 81000 URINALYSIS NONAUTO W/SCOPE: CPT

## 2018-10-27 PROCEDURE — 96374 THER/PROPH/DIAG INJ IV PUSH: CPT

## 2018-10-27 PROCEDURE — 96361 HYDRATE IV INFUSION ADD-ON: CPT

## 2018-10-27 PROCEDURE — 63600175 PHARM REV CODE 636 W HCPCS: Performed by: NURSE PRACTITIONER

## 2018-10-27 RX ORDER — NAPROXEN 500 MG/1
500 TABLET ORAL 2 TIMES DAILY PRN
Qty: 10 TABLET | Refills: 0 | Status: SHIPPED | OUTPATIENT
Start: 2018-10-27 | End: 2018-11-01

## 2018-10-27 RX ORDER — ACETAMINOPHEN 325 MG/1
650 TABLET ORAL
Status: COMPLETED | OUTPATIENT
Start: 2018-10-27 | End: 2018-10-27

## 2018-10-27 RX ORDER — ONDANSETRON 4 MG/1
4 TABLET, ORALLY DISINTEGRATING ORAL EVERY 8 HOURS PRN
Qty: 12 TABLET | Refills: 0 | Status: SHIPPED | OUTPATIENT
Start: 2018-10-27 | End: 2019-02-12

## 2018-10-27 RX ORDER — ONDANSETRON 4 MG/1
4 TABLET, ORALLY DISINTEGRATING ORAL
Status: COMPLETED | OUTPATIENT
Start: 2018-10-27 | End: 2018-10-27

## 2018-10-27 RX ORDER — ONDANSETRON 2 MG/ML
4 INJECTION INTRAMUSCULAR; INTRAVENOUS
Status: COMPLETED | OUTPATIENT
Start: 2018-10-27 | End: 2018-10-27

## 2018-10-27 RX ORDER — LISINOPRIL 10 MG/1
10 TABLET ORAL DAILY
COMMUNITY
End: 2019-06-23

## 2018-10-27 RX ORDER — HYDROCODONE BITARTRATE AND ACETAMINOPHEN 5; 325 MG/1; MG/1
1 TABLET ORAL EVERY 4 HOURS PRN
Qty: 2 TABLET | Refills: 0 | Status: SHIPPED | OUTPATIENT
Start: 2018-10-27 | End: 2018-11-23 | Stop reason: ALTCHOICE

## 2018-10-27 RX ORDER — DIPHENHYDRAMINE HYDROCHLORIDE 50 MG/ML
25 INJECTION INTRAMUSCULAR; INTRAVENOUS
Status: COMPLETED | OUTPATIENT
Start: 2018-10-27 | End: 2018-10-27

## 2018-10-27 RX ORDER — KETOROLAC TROMETHAMINE 30 MG/ML
10 INJECTION, SOLUTION INTRAMUSCULAR; INTRAVENOUS
Status: COMPLETED | OUTPATIENT
Start: 2018-10-27 | End: 2018-10-27

## 2018-10-27 RX ADMIN — ONDANSETRON 4 MG: 4 TABLET, ORALLY DISINTEGRATING ORAL at 07:10

## 2018-10-27 RX ADMIN — ONDANSETRON HYDROCHLORIDE 4 MG: 2 INJECTION INTRAMUSCULAR; INTRAVENOUS at 05:10

## 2018-10-27 RX ADMIN — SODIUM CHLORIDE 1000 ML: 0.9 INJECTION, SOLUTION INTRAVENOUS at 05:10

## 2018-10-27 RX ADMIN — KETOROLAC TROMETHAMINE 10 MG: 30 INJECTION, SOLUTION INTRAMUSCULAR at 05:10

## 2018-10-27 RX ADMIN — ACETAMINOPHEN 650 MG: 325 TABLET, FILM COATED ORAL at 05:10

## 2018-10-27 RX ADMIN — DIPHENHYDRAMINE HYDROCHLORIDE 25 MG: 50 INJECTION, SOLUTION INTRAMUSCULAR; INTRAVENOUS at 05:10

## 2018-10-27 NOTE — DISCHARGE INSTRUCTIONS
Please return to the Emergency Department for any new or worsening symptoms including: worsening or changes in your headache pattern, fever, chest pain, shortness of breath, loss of consciousness, dizziness, weakness, or any other concerns.     Please follow up with your Primary Care Provider as scheduled on Monday. If you do not have one, you may contact the one listed on your discharge paperwork or you may also call the [facility] Appointment Desk at [phone] to schedule an appointment with one.     Please take all medication as prescribed. You have been prescribed Norco for Headache. Please do not take this medication while working, drinking alcohol, swimming, or while driving/operating heavy machinery. This medication may cause drowsiness, impair judgment, and reduce physical capabilities.    You have been prescribed Naproxen for headache pain. This is an Non-Steroidal Anti-Inflammatory (NSAID) Medication. Please do not take any additional NSAIDs while you are taking this medication including (Advil, Aleve, Motrin, Ibuprofen, Mobic\meloxicam, Naprosyn, Toradol, ketoralac, etc.). Please stop taking this medication if you experience: weakness, itching, yellow skin or eyes, joint pains, vomiting blood, blood or black stools, unusual weight gain, or swelling in your arms, legs, hands, or feet.     Zofran as needed for nausea or vomiting.

## 2018-10-27 NOTE — ED PROVIDER NOTES
"Encounter Date: 10/27/2018    SCRIBE #1 NOTE: I, Gómez Stephenson, am scribing for, and in the presence of,  GIOVANNI Nuñez. I have scribed the following portions of the note - Other sections scribed: HPI and ROS.       History     Chief Complaint   Patient presents with    Headache     frontal headache x several hours.  + recent sinus drainage.  ringing and fullness to right ear.  + nausea and vomiting today x 2.  denies diarrhea or fever.     CC: Headache    HPI: This 33 y.o F with anxiety, depression and HTN presents to the ED c/o gradual onset of a constant and severe (10/10) frontal and temporal headache with associated nausea and emesis x2 which began several hours ago. She states these symptoms are consistent with her previous episodes of migraines, which she usually experiences x1 every other month. She also reports right tinnitus and right ear fullness, which she does not usually experience with her usually migraines. She states her migraines usually resolve after taking a Xanax. She attempted tx with Xanax this AM with no relief. She denies fever, chills, diaphoresis, nausea, emesis, diarrhea, chest pain, abdominal pain, back pain, dysuria, difficulty urinating, hematuria, blurry vision, weakness, numbness, SOB and rash. No prior tx.       The history is provided by the patient. No  was used.     Review of patient's allergies indicates:   Allergen Reactions    Compazine [prochlorperazine edisylate] Hives    Fioricet [butalbital-acetaminophen-caff] Other (See Comments)     Reaction:  "Really bad yeast infections"    Reglan [metoclopramide] Rash     Past Medical History:   Diagnosis Date    Anxiety     Bladder infection     Carpal tunnel syndrome     Depression     Hypertension     Migraine headache     Pancreatitis     UTI (lower urinary tract infection)      Past Surgical History:   Procedure Laterality Date    CHOLECYSTECTOMY      CHOLECYSTECTOMY, LAPAROSCOPIC N/A " 12/21/2012    Performed by [facility] at [facility]    KNEE SURGERY   aug 2008    right knee    KNEE SURGERY      REDUCTION MAMMAPLASTY      Breast reduction     Family History   Problem Relation Age of Onset    Hypertension Mother     Hypertension Father     Hypertension Maternal Grandmother     Breast cancer Neg Hx     Colon cancer Neg Hx     Ovarian cancer Neg Hx      Social History     Tobacco Use    Smoking status: Current Every Day Smoker     Packs/day: 1.00     Years: 8.00     Pack years: 8.00     Types: Cigarettes    Smokeless tobacco: Never Used    Tobacco comment: Pt states she smokes 1 pack of cigarettes in 3 days.   Substance Use Topics    Alcohol use: No    Drug use: No     Review of Systems   Constitutional: Negative for chills, diaphoresis and fever.   HENT: Positive for tinnitus (right). Negative for congestion, rhinorrhea, sore throat and trouble swallowing.         (+) R ear fullness   Eyes: Negative for redness and visual disturbance.   Respiratory: Negative for cough and shortness of breath.    Cardiovascular: Negative for chest pain.   Gastrointestinal: Positive for nausea and vomiting. Negative for abdominal pain and diarrhea.   Genitourinary: Negative for dysuria, frequency and urgency.   Musculoskeletal: Negative for back pain, neck pain and neck stiffness.   Skin: Negative for rash.   Neurological: Positive for headaches. Negative for dizziness, weakness and numbness.   Psychiatric/Behavioral: Negative for confusion. The patient is not nervous/anxious.        Physical Exam     Initial Vitals [10/27/18 1620]   BP Pulse Resp Temp SpO2   (!) 158/106 (!) 115 18 98.2 °F (36.8 °C) 97 %      MAP       --         Physical Exam    Nursing note and vitals reviewed.  Constitutional: She appears well-developed and well-nourished. She is not diaphoretic. She is cooperative.  Non-toxic appearance. She does not have a sickly appearance. She does not appear ill. No distress.   HENT:    Head: Normocephalic and atraumatic.   Right Ear: External ear normal. Tympanic membrane is not erythematous. A middle ear effusion is present.   Left Ear: Tympanic membrane and external ear normal. Tympanic membrane is not erythematous.  No middle ear effusion.   Nose: Rhinorrhea present.   Mouth/Throat: Uvula is midline and mucous membranes are normal. No trismus in the jaw. No oropharyngeal exudate, posterior oropharyngeal edema, posterior oropharyngeal erythema or tonsillar abscesses.   Posterior pharyngeal cobblestoning without tonsillar erythema or exudates.   Eyes: Conjunctivae are normal. Pupils are equal, round, and reactive to light.   Neck: Normal range of motion and phonation normal. Neck supple. No tracheal deviation and normal range of motion present. No neck rigidity.   Cardiovascular: Normal rate and regular rhythm. Exam reveals no gallop and no friction rub.    No murmur heard.  Pulses:       Radial pulses are 2+ on the right side, and 2+ on the left side.   Pulmonary/Chest: Effort normal and breath sounds normal. No stridor. No tachypnea and no bradypnea. No respiratory distress. She has no wheezes. She has no rhonchi. She has no rales. She exhibits no tenderness.   Abdominal: Soft. She exhibits no distension and no mass. There is no tenderness. There is no rigidity, no rebound and no guarding.   Musculoskeletal: Normal range of motion.   Lymphadenopathy:     She has no cervical adenopathy.        Right cervical: No superficial cervical adenopathy present.       Left cervical: No superficial cervical adenopathy present.   Neurological: She is alert and oriented to person, place, and time. She has normal strength. No sensory deficit. Coordination and gait normal. GCS eye subscore is 4. GCS verbal subscore is 5. GCS motor subscore is 6.   She is awake, alert, and oriented x3. PERRL. EOM's Intact. Gross visual acuity is intact. No tongue deviation or slurred speech. Bilateral facial movement is  symmetrical.  Patient hears commands and appropriately responds. Equal strength with upper and lower extremities. No truncal ataxia. Ambulates with a steady gait.    Skin: Skin is warm, dry and intact. No bruising and no rash noted. No cyanosis or erythema. Nails show no clubbing.   Psychiatric: She has a normal mood and affect. Her behavior is normal. Thought content normal.         ED Course   Procedures  Labs Reviewed   URINALYSIS, REFLEX TO URINE CULTURE - Abnormal; Notable for the following components:       Result Value    Occult Blood UA 1+ (*)     All other components within normal limits    Narrative:     Preferred Collection Type->Urine, Clean Catch   URINALYSIS MICROSCOPIC    Narrative:     Preferred Collection Type->Urine, Clean Catch   POCT URINE PREGNANCY          Imaging Results    None          Medical Decision Making:   History:   Old Medical Records: I decided to obtain old medical records.  Old Records Summarized: records from previous admission(s).       <> Summary of Records: Patient seen in this emergency department on several occasions for headaches, most recent of which was 10/12/2018.       APC / Resident Notes:   This is an evaluation of a 33 y.o. female that presents to the Emergency Department for headache. She reports this headache is similar to previous migraine type headaches in location and nature. She reports a new sensation of right ear fullness. The patient is a non-toxic, afebrile, and well appearing female. On physical exam, there is small air fluid level to the right ear without signs of infection.  Posterior pharyngeal cobblestoning without tonsillar erythema or exudates. She has no focal weakness or neurological deficits. Has full ROM of neck with no nuchal rigidity or meningeal signs. There is no staggering or ataxic gait, vomiting, double vision, visual loss, slurred speech, numbness of the face or body, weakness, clumsiness, or incoordination.  Breath sounds are clear to  auscultation. Heart regular rhythm.  Abdomen is soft and nontender. Vital Signs are Reassuring. RESULTS:  UPT negative.    Progress note:  The patient reports a history to Compazine, Reglan, and Fioricet.  Will attempt treatment with Toradol, Benadryl, Tylenol and reassess.  After IV fluids, Tylenol, Zofran Toradol, and Benadryl patient reports her headache is unchanged.  I offered her a dose of oral hydrocodone.  She reports she does not have a ride and if he was going to be an oral medication she would prefer to take at home.  I will give her 2 doses of Norco to take to help with headache at home.     Given the above findings, my overall impression is recurrent headaches. Given the above findings, I do not think the patient has meningitis, SAH\ICH, OM, OE, strep pharyngitis, meningitis.  Given she reports the symptoms as similar to previous headaches, no indication for CAT scan at this time.    DC Meds:  Norco, Zofran, and naproxen. Additional recommendations hydration, keep appointment with primary care on Monday. The diagnosis, treatment plan, instructions for follow-up and reevaluation with primary care or headache specialist as well as ED return precautions have been discussed with the patient and the patient has verbalized an understanding of the information. All questions or concerns have been addressed. This case was discussed with Dr. Bennett who assisted in the formulation of plan. LEX Lizarraga, LEONORC        Scribe Attestation:   Scribe #1: I performed the above scribed service and the documentation accurately describes the services I performed. I attest to the accuracy of the note.    Attending Attestation:           Physician Attestation for Scribe:  Physician Attestation Statement for Scribe #1: I, GIOVANNI Nuñez, reviewed documentation, as scribed by Gómezricha Cunninghamters in my presence, and it is both accurate and complete.                    Clinical Impression:   The primary encounter  diagnosis was Recurrent headache. A diagnosis of Ear fullness, right was also pertinent to this visit.      Disposition:   Disposition: Discharged  Condition: Stable                        GIOVANNI Wilson  10/27/18 2032

## 2018-10-27 NOTE — ED TRIAGE NOTES
"Patient reports a headache that began " a few hours ago".  Patient headache is accompanied by nausea denies any changes is vision.  Patient has a hx of migraines.  Patient also reports photosensitivity.  "

## 2018-11-04 ENCOUNTER — HOSPITAL ENCOUNTER (EMERGENCY)
Facility: HOSPITAL | Age: 33
Discharge: HOME | End: 2018-11-05
Attending: EMERGENCY MEDICINE
Payer: MEDICAID

## 2018-11-04 VITALS
TEMPERATURE: 98 F | DIASTOLIC BLOOD PRESSURE: 114 MMHG | SYSTOLIC BLOOD PRESSURE: 151 MMHG | OXYGEN SATURATION: 99 % | HEART RATE: 115 BPM | BODY MASS INDEX: 34.04 KG/M2 | WEIGHT: 185 LBS | RESPIRATION RATE: 18 BRPM | HEIGHT: 62 IN

## 2018-11-04 DIAGNOSIS — R06.7 SNEEZING: ICD-10-CM

## 2018-11-04 DIAGNOSIS — Z91.09 ENVIRONMENTAL ALLERGIES: Primary | ICD-10-CM

## 2018-11-04 DIAGNOSIS — L29.8 ITCHY NOSE: ICD-10-CM

## 2018-11-04 PROCEDURE — 99284 EMERGENCY DEPT VISIT MOD MDM: CPT

## 2018-11-04 RX ORDER — FLUTICASONE PROPIONATE 50 MCG
1 SPRAY, SUSPENSION (ML) NASAL 2 TIMES DAILY PRN
Qty: 15 G | Refills: 0 | Status: SHIPPED | OUTPATIENT
Start: 2018-11-04 | End: 2019-02-12

## 2018-11-04 RX ORDER — FENOFIBRATE 145 MG/1
145 TABLET, FILM COATED ORAL DAILY
COMMUNITY
End: 2019-06-23

## 2018-11-04 RX ORDER — HYDROXYZINE HYDROCHLORIDE 25 MG/1
25 TABLET, FILM COATED ORAL EVERY 6 HOURS
Qty: 20 TABLET | Refills: 0 | Status: SHIPPED | OUTPATIENT
Start: 2018-11-04 | End: 2019-02-12

## 2018-11-05 NOTE — ED PROVIDER NOTES
"Encounter Date: 11/4/2018       History     Chief Complaint   Patient presents with    Allergies     Patient reportts itchy eyes, nasal itching, throat itiching, and headache x "a few days"     33-year-old female with history of migraines presents with sneezing, itchy nose, itchy throat. She states symptoms started 3 days ago, not relieved with Benadryl, Flonase, Claritin.  She reports history of allergies in the past but never this severe.  She states she has been sneezing so much it is causing her migraine to act up.  She does endorse a headache tonight, similar to migraines in the past.  She is not febrile.  She denies cough or chest pain.          Review of patient's allergies indicates:   Allergen Reactions    Compazine [prochlorperazine edisylate] Hives    Fioricet [butalbital-acetaminophen-caff] Other (See Comments)     Reaction:  "Really bad yeast infections"    Reglan [metoclopramide] Rash     Past Medical History:   Diagnosis Date    Anxiety     Bladder infection     Carpal tunnel syndrome     Depression     Hypertension     Migraine headache     Pancreatitis     UTI (lower urinary tract infection)      Past Surgical History:   Procedure Laterality Date    CHOLECYSTECTOMY      CHOLECYSTECTOMY, LAPAROSCOPIC N/A 12/21/2012    Per[facility]    KNEE SURGERY   aug 2008    right knee    KNEE SURGERY      REDUCTION MAMMAPLASTY      Breast reduction     Family History   Problem Relation Age of Onset    Hypertension Mother     Hypertension Father     Hypertension Maternal Grandmother     Breast cancer Neg Hx     Colon cancer Neg Hx     Ovarian cancer Neg Hx      Social History     Tobacco Use    Smoking status: Current Every Day Smoker     Packs/day: 1.00     Years: 8.00     Pack years: 8.00     Types: Cigarettes    Smokeless tobacco: Never Used    Tobacco comment: Pt states she smokes 1 pack of cigarettes in 3 days.   Substance Use Topics    Alcohol use: No "    Drug use: No     Review of Systems   Constitutional: Negative for fever.   HENT: Positive for sneezing.    Eyes: Positive for itching.   Respiratory: Negative for cough.    Cardiovascular: Negative for chest pain.   Gastrointestinal: Negative for vomiting.   Genitourinary: Negative for difficulty urinating.   Skin: Negative for rash.   Neurological: Positive for headaches.       Physical Exam     Initial Vitals [11/04/18 2104]   BP Pulse Resp Temp SpO2   (!) 151/114 (!) 115 18 97.7 °F (36.5 °C) 99 %      MAP       --         Physical Exam    Constitutional: She appears well-developed and well-nourished. She is not diaphoretic. No distress.   HENT:   Head:       Right Ear: Tympanic membrane normal.   Left Ear: Tympanic membrane normal.   Nose: Mucosal edema present. No rhinorrhea.   Mouth/Throat: Uvula is midline, oropharynx is clear and moist and mucous membranes are normal. No uvula swelling. No oropharyngeal exudate or posterior oropharyngeal edema.   Eyes: Pupils are equal, round, and reactive to light.   Bilateral conjunctiva with mild injection   Neck: Neck supple.   Cardiovascular: Normal rate and regular rhythm.   Pulmonary/Chest: Breath sounds normal. No respiratory distress. She has no wheezes.   Lymphadenopathy:     She has no cervical adenopathy.   Neurological: She is alert and oriented to person, place, and time.   Moves all 4 extremities, no slurred speech, no facial droop.   Skin: Skin is warm and dry.   Psychiatric: She has a normal mood and affect.         ED Course   Procedures  Labs Reviewed - No data to display       Imaging Results    None          Medical Decision Making:   Initial Assessment:   33-year-old female with itchy eyes, sneezing, scratchy throat. I suspect seasonal allergies.  I do not suspect bacterial sinusitis versus pharyngitis versus pneumonia.  I do not suspect anaphylaxis.  Will treat with Flonase and hydroxyzine, advised her to follow up with her primary care for further  evaluation.  Alda Elizabeth MD   9:30 PM                        Clinical Impression:   The primary encounter diagnosis was Environmental allergies. Diagnoses of Sneezing and Itchy nose were also pertinent to this visit.                             Alda Elizabeth MD  11/04/18 4173

## 2018-11-05 NOTE — ED TRIAGE NOTES
Patient presents to the ED via personal transportation alone. Patient reports nasal congestion and itching, itchy eyes and scratchy throat, and headache x 3 days.

## 2018-11-23 ENCOUNTER — HOSPITAL ENCOUNTER (EMERGENCY)
Facility: HOSPITAL | Age: 33
Discharge: HOME | End: 2018-11-23
Attending: EMERGENCY MEDICINE
Payer: MEDICAID

## 2018-11-23 VITALS
WEIGHT: 185 LBS | TEMPERATURE: 98 F | OXYGEN SATURATION: 97 % | BODY MASS INDEX: 34.04 KG/M2 | SYSTOLIC BLOOD PRESSURE: 130 MMHG | HEART RATE: 75 BPM | RESPIRATION RATE: 19 BRPM | HEIGHT: 62 IN | DIASTOLIC BLOOD PRESSURE: 74 MMHG

## 2018-11-23 DIAGNOSIS — R51.9 ACUTE NONINTRACTABLE HEADACHE, UNSPECIFIED HEADACHE TYPE: Primary | ICD-10-CM

## 2018-11-23 LAB
B-HCG UR QL: NEGATIVE
CTP QC/QA: YES
POCT GLUCOSE: 148 MG/DL (ref 70–110)

## 2018-11-23 PROCEDURE — 96374 THER/PROPH/DIAG INJ IV PUSH: CPT

## 2018-11-23 PROCEDURE — 99284 EMERGENCY DEPT VISIT MOD MDM: CPT | Mod: 25

## 2018-11-23 PROCEDURE — 96375 TX/PRO/DX INJ NEW DRUG ADDON: CPT

## 2018-11-23 PROCEDURE — 81025 URINE PREGNANCY TEST: CPT | Performed by: EMERGENCY MEDICINE

## 2018-11-23 PROCEDURE — 63600175 PHARM REV CODE 636 W HCPCS: Performed by: EMERGENCY MEDICINE

## 2018-11-23 PROCEDURE — 25000003 PHARM REV CODE 250: Performed by: EMERGENCY MEDICINE

## 2018-11-23 PROCEDURE — 82962 GLUCOSE BLOOD TEST: CPT

## 2018-11-23 RX ORDER — LORAZEPAM 2 MG/ML
1 INJECTION INTRAMUSCULAR
Status: COMPLETED | OUTPATIENT
Start: 2018-11-23 | End: 2018-11-23

## 2018-11-23 RX ORDER — DEXTROAMPHETAMINE SACCHARATE, AMPHETAMINE ASPARTATE MONOHYDRATE, DEXTROAMPHETAMINE SULFATE AND AMPHETAMINE SULFATE 7.5; 7.5; 7.5; 7.5 MG/1; MG/1; MG/1; MG/1
30 CAPSULE, EXTENDED RELEASE ORAL EVERY MORNING
COMMUNITY
End: 2019-06-23

## 2018-11-23 RX ORDER — KETOROLAC TROMETHAMINE 30 MG/ML
15 INJECTION, SOLUTION INTRAMUSCULAR; INTRAVENOUS
Status: COMPLETED | OUTPATIENT
Start: 2018-11-23 | End: 2018-11-23

## 2018-11-23 RX ORDER — PROCHLORPERAZINE EDISYLATE 5 MG/ML
10 INJECTION INTRAMUSCULAR; INTRAVENOUS ONCE
Status: COMPLETED | OUTPATIENT
Start: 2018-11-23 | End: 2018-11-23

## 2018-11-23 RX ORDER — MORPHINE SULFATE 4 MG/ML
2 INJECTION, SOLUTION INTRAMUSCULAR; INTRAVENOUS
Status: COMPLETED | OUTPATIENT
Start: 2018-11-23 | End: 2018-11-23

## 2018-11-23 RX ORDER — DIPHENHYDRAMINE HYDROCHLORIDE 50 MG/ML
25 INJECTION INTRAMUSCULAR; INTRAVENOUS
Status: COMPLETED | OUTPATIENT
Start: 2018-11-23 | End: 2018-11-23

## 2018-11-23 RX ADMIN — LORAZEPAM 1 MG: 2 INJECTION INTRAMUSCULAR; INTRAVENOUS at 04:11

## 2018-11-23 RX ADMIN — PROCHLORPERAZINE EDISYLATE 10 MG: 5 INJECTION INTRAMUSCULAR; INTRAVENOUS at 04:11

## 2018-11-23 RX ADMIN — DIPHENHYDRAMINE HYDROCHLORIDE 25 MG: 50 INJECTION, SOLUTION INTRAMUSCULAR; INTRAVENOUS at 04:11

## 2018-11-23 RX ADMIN — KETOROLAC TROMETHAMINE 15 MG: 30 INJECTION, SOLUTION INTRAMUSCULAR at 04:11

## 2018-11-23 RX ADMIN — PROPRANOLOL HYDROCHLORIDE 60 MG: 40 TABLET ORAL at 04:11

## 2018-11-23 RX ADMIN — MORPHINE SULFATE 2 MG: 4 INJECTION INTRAVENOUS at 04:11

## 2018-11-23 NOTE — ED TRIAGE NOTES
Arrived c/o L-sided HA x 2 days. Pt has migraine hx, but says this HA is different because it throbs in only one area. Reports home /111 and 147/102. She last took propranolol dose at 11 pm. Also has dizziness, photosensitivity, nausea, and emesis x 3, and new onset R-hand numbness today. FAST assessment passed and NIH scale 0. Will continue to monitor.

## 2018-11-23 NOTE — NURSING
D/C instructions given to patient at bedside. Pt verbalizes understanding of instructions. Pt states willingness to comply. Saline lock and tele monitoring removed. Pt verbalized understanding that she received several medications that could make her drowsy and recommendation to arrange other transportation. Pt feels confident driving herself and d/c.

## 2018-11-23 NOTE — DISCHARGE INSTRUCTIONS
You were seen in the emergency department for headache. Your CT scan and exam is reassuring. We gave you some headache medication and you're headache resolved. We think you're safe to go home.  Please follow up with your primary care provider as needed for continual recurrent headaches. Please return for any new headaches, numbness, weakness, changes in vision, or other new or worsening concerns.

## 2018-11-23 NOTE — ED PROVIDER NOTES
"Encounter Date: 11/23/2018    SCRIBE #1 NOTE: I, Mabel Hairston, am scribing for, and in the presence of,  Timi Noriega MD. I have scribed the following portions of the note - Other sections scribed: HPI, ROS, PE.       History     Chief Complaint   Patient presents with    Headache     Pt c/o left side headache that has been on/off x 2 days.  States the pain has been constant for the past 3 hours in same spot (not her normal headaches).  Pt reports taking ibuprofen about 5 hours ago.     CC: Headache    34 y/o female, smoker, with PMHx of anxiety, HTN, and migraine headache presents to ED for emergent evaluation of a sudden onset headache to the L temple that began x2 days ago and has gradually worsened.  Pt reports headache began as "minor" and presently feels like "pressure" and "throbbing." She states L temple "feels like a bubble about to pop."  Pt reports hx of headaches, but states she has never before had a headache in this location.  Pt also reports R hand numbness, blurred vision, and 2-3 ep vomiting that began yesterday.  No tx at home.  Pt denies alcohol or drug use. Pt denies chest pain, SOB, congestion, cough, sore throat, neck pain and back pain.  No other symptoms reported.          The history is provided by the patient. No  was used.     Review of patient's allergies indicates:   Allergen Reactions    Compazine [prochlorperazine edisylate] Anxiety    Fioricet [butalbital-acetaminophen-caff] Other (See Comments)     Reaction:  "Really bad yeast infections"    Reglan [metoclopramide] Rash     Past Medical History:   Diagnosis Date    Anxiety     Bladder infection     Carpal tunnel syndrome     Depression     Hypertension     Migraine headache     Pancreatitis     UTI (lower urinary tract infection)      Past Surgical History:   Procedure Laterality Date    CHOLECYSTECTOMY      CHOLECYSTECTOMY, LAPAROSCOPIC N/A 12/21/2012    Performed by Zeferino Daugherty MD at " [facility] OR    KNEE SURGERY   aug 2008    right knee    KNEE SURGERY      REDUCTION MAMMAPLASTY      Breast reduction     Family History   Problem Relation Age of Onset    Hypertension Mother     Hypertension Father     Hypertension Maternal Grandmother     Breast cancer Neg Hx     Colon cancer Neg Hx     Ovarian cancer Neg Hx      Social History     Tobacco Use    Smoking status: Current Every Day Smoker     Packs/day: 0.50     Years: 8.00     Pack years: 4.00     Types: Cigarettes    Smokeless tobacco: Never Used    Tobacco comment: Pt states she smokes 1 pack of cigarettes in 3 days.   Substance Use Topics    Alcohol use: No    Drug use: No     Review of Systems   Constitutional: Negative for chills and fever.   HENT: Negative for congestion and sore throat.    Eyes: Positive for visual disturbance (blurred vision).   Respiratory: Negative for cough and shortness of breath.    Cardiovascular: Negative for chest pain and palpitations.   Gastrointestinal: Positive for nausea and vomiting. Negative for abdominal pain and diarrhea.   Skin: Negative for wound.   Neurological: Positive for numbness (R hand) and headaches. Negative for syncope.   Psychiatric/Behavioral: Negative for confusion.   All other systems reviewed and are negative.      Physical Exam     Initial Vitals [11/23/18 0340]   BP Pulse Resp Temp SpO2   (!) 175/119 (!) 128 16 98.4 °F (36.9 °C) 98 %      MAP       --         Physical Exam    Nursing note and vitals reviewed.  Constitutional: She is not diaphoretic. No distress.   HENT:   Head: Normocephalic and atraumatic.   Mouth/Throat: Oropharynx is clear and moist.   Tenderness with palpation to left temple area   Eyes: EOM are normal. Pupils are equal, round, and reactive to light. No scleral icterus.   Neck: Normal range of motion. Neck supple. No JVD present.   Cardiovascular: Regular rhythm. Tachycardia present.    Pulmonary/Chest: Breath sounds normal. No stridor. No respiratory  distress.   Abdominal: Soft. Bowel sounds are normal. She exhibits no distension. There is no tenderness.   Musculoskeletal: Normal range of motion. She exhibits no edema or tenderness.   Neurological: She is alert and oriented to person, place, and time. She has normal strength and normal reflexes. No cranial nerve deficit or sensory deficit. GCS score is 15. GCS eye subscore is 4. GCS verbal subscore is 5. GCS motor subscore is 6.   Skin: Skin is warm and dry.   Psychiatric: She has a normal mood and affect.         ED Course   Procedures  Labs Reviewed   POCT GLUCOSE - Abnormal; Notable for the following components:       Result Value    POCT Glucose 148 (*)     All other components within normal limits   POCT URINE PREGNANCY          Imaging Results          CT Head Without Contrast (Final result)  Result time 11/23/18 05:43:08    Final result by Jeanne Rivers MD (11/23/18 05:43:08)                 Impression:      No CT evidence of acute intracranial abnormality. Clinical correlation and further evaluation as warranted.      Electronically signed by: Jeanne Rivers MD  Date:    11/23/2018  Time:    05:43             Narrative:    EXAMINATION:  CT HEAD WITHOUT CONTRAST    CLINICAL HISTORY:  left sided temporal headache, right hand numbness;    TECHNIQUE:  Low dose axial images were obtained through the head.  Coronal and sagittal reformations were also performed. Contrast was not administered.    COMPARISON:  Head CT 10/02/2015    FINDINGS:  There is no acute intracranial hemorrhage, hydrocephalus, midline shift or mass effect. Gray-white matter differentiation appears maintained. The basal cisterns are patent. The mastoid air cells and paranasal sinuses are clear of acute process noting there is slight lobular mucosal thickening of the right maxillary sinus possibly representing small mucous retention cyst.  The visualized bones of the calvarium demonstrate no acute osseous abnormality.                                  Medical Decision Making:   Initial Assessment:   33-year-old female with a history of hypertension and recurrent headaches presenting with a left frontotemporal headache.  Patient states headache is severe and pounding.  Reports headache 1st started a few days ago but improved.  States they occurred more severe than ever before.  Patient also notes slight left blurred vision swell as right hand tingling.  Denies numbness or weakness.  Nausea and vomiting x2 today.  Patient denies fevers, chills abdominal pain, chest pain.  Denies recent sick contacts.  Denies neck pain or stiffness. Glucose here of code facies mildly uncomfortable.  Neuro exam nonfocal. Normal reflexes, no nystagmus, no meningismus, normal strength upper lower bilaterally.  Symmetrical face ease, no slurred speech.  Patient mildly tachycardic.  Patient is due for dose of propranolol.  Will give a dose propranolol, Compazine, and Toradol.  Patient states Compazine makes her very anxious.  We will address this with Benadryl and a small dose of Ativan which she states also helped with her headaches.  We will get a CT scan of her head given her atypical symptoms and rapid aggressive onset, though my suspicion for intracranial hemorrhage is relatively low.   ED Management:  CT scan and workup negative. Patient's symptoms resolved with propranolol, as well as migraine cocktail.  Patient well-appearing, no focal deficits noted.  Patient okay for discharge home.  Discussed strict return precautions as well as primary care or neurology follow-up.            Scribe Attestation:   Scribe #1: I performed the above scribed service and the documentation accurately describes the services I performed. I attest to the accuracy of the note.    Attending Attestation:           Physician Attestation for Scribe:  Physician Attestation Statement for Scribe #1: I, Timi Noriega MD, reviewed documentation, as scribed by Mabel Hairston in my presence, and it is  both accurate and complete.                    Clinical Impression:   The encounter diagnosis was Acute nonintractable headache, unspecified headache type.      Disposition:   Disposition: Discharged  Condition: Stable                        Timi Noriega MD  11/27/18 0564

## 2018-11-29 ENCOUNTER — HOSPITAL ENCOUNTER (EMERGENCY)
Facility: HOSPITAL | Age: 33
Discharge: HOME | End: 2018-11-29
Attending: EMERGENCY MEDICINE
Payer: MEDICAID

## 2018-11-29 VITALS
TEMPERATURE: 98 F | HEART RATE: 79 BPM | DIASTOLIC BLOOD PRESSURE: 85 MMHG | WEIGHT: 185 LBS | BODY MASS INDEX: 34.04 KG/M2 | RESPIRATION RATE: 19 BRPM | OXYGEN SATURATION: 98 % | HEIGHT: 62 IN | SYSTOLIC BLOOD PRESSURE: 128 MMHG

## 2018-11-29 DIAGNOSIS — R10.13 EPIGASTRIC PAIN: Primary | ICD-10-CM

## 2018-11-29 LAB
ALBUMIN SERPL BCP-MCNC: 3.9 G/DL
ALP SERPL-CCNC: 64 U/L
ALT SERPL W/O P-5'-P-CCNC: 18 U/L
AMPHET+METHAMPHET UR QL: NORMAL
ANION GAP SERPL CALC-SCNC: 11 MMOL/L
AST SERPL-CCNC: 21 U/L
B-HCG UR QL: NEGATIVE
BARBITURATES UR QL SCN>200 NG/ML: NEGATIVE
BASOPHILS # BLD AUTO: 0.02 K/UL
BASOPHILS NFR BLD: 0.2 %
BENZODIAZ UR QL SCN>200 NG/ML: NORMAL
BILIRUB SERPL-MCNC: 0.2 MG/DL
BILIRUB UR QL STRIP: NEGATIVE
BUN SERPL-MCNC: 11 MG/DL
BZE UR QL SCN: NEGATIVE
CALCIUM SERPL-MCNC: 9.5 MG/DL
CANNABINOIDS UR QL SCN: NEGATIVE
CHLORIDE SERPL-SCNC: 105 MMOL/L
CLARITY UR: CLEAR
CO2 SERPL-SCNC: 19 MMOL/L
COLOR UR: NORMAL
CREAT SERPL-MCNC: 0.8 MG/DL
CREAT UR-MCNC: 50.3 MG/DL
CTP QC/QA: YES
DIFFERENTIAL METHOD: ABNORMAL
EOSINOPHIL # BLD AUTO: 0.1 K/UL
EOSINOPHIL NFR BLD: 1.1 %
ERYTHROCYTE [DISTWIDTH] IN BLOOD BY AUTOMATED COUNT: 12.6 %
EST. GFR  (AFRICAN AMERICAN): >60 ML/MIN/1.73 M^2
EST. GFR  (NON AFRICAN AMERICAN): >60 ML/MIN/1.73 M^2
ETHANOL SERPL-MCNC: <10 MG/DL
GLUCOSE SERPL-MCNC: 113 MG/DL
GLUCOSE UR QL STRIP: NEGATIVE
HCT VFR BLD AUTO: 39.3 %
HGB BLD-MCNC: 13.7 G/DL
HGB UR QL STRIP: NEGATIVE
KETONES UR QL STRIP: NEGATIVE
LEUKOCYTE ESTERASE UR QL STRIP: NEGATIVE
LIPASE SERPL-CCNC: 10 U/L
LYMPHOCYTES # BLD AUTO: 5.3 K/UL
LYMPHOCYTES NFR BLD: 41.3 %
MCH RBC QN AUTO: 30.9 PG
MCHC RBC AUTO-ENTMCNC: 34.9 G/DL
MCV RBC AUTO: 89 FL
METHADONE UR QL SCN>300 NG/ML: NEGATIVE
MONOCYTES # BLD AUTO: 0.7 K/UL
MONOCYTES NFR BLD: 5.4 %
NEUTROPHILS # BLD AUTO: 6.7 K/UL
NEUTROPHILS NFR BLD: 52 %
NITRITE UR QL STRIP: NEGATIVE
OPIATES UR QL SCN: NEGATIVE
PCP UR QL SCN>25 NG/ML: NEGATIVE
PH UR STRIP: 5 [PH] (ref 5–8)
PLATELET # BLD AUTO: 309 K/UL
PMV BLD AUTO: 8.8 FL
POTASSIUM SERPL-SCNC: 3.7 MMOL/L
PROT SERPL-MCNC: 8 G/DL
PROT UR QL STRIP: NEGATIVE
RBC # BLD AUTO: 4.43 M/UL
SODIUM SERPL-SCNC: 135 MMOL/L
SP GR UR STRIP: 1.01 (ref 1–1.03)
TOXICOLOGY INFORMATION: NORMAL
URN SPEC COLLECT METH UR: NORMAL
UROBILINOGEN UR STRIP-ACNC: NEGATIVE EU/DL
WBC # BLD AUTO: 12.8 K/UL

## 2018-11-29 PROCEDURE — 81025 URINE PREGNANCY TEST: CPT | Performed by: EMERGENCY MEDICINE

## 2018-11-29 PROCEDURE — 96360 HYDRATION IV INFUSION INIT: CPT

## 2018-11-29 PROCEDURE — 25000003 PHARM REV CODE 250: Performed by: EMERGENCY MEDICINE

## 2018-11-29 PROCEDURE — 99284 EMERGENCY DEPT VISIT MOD MDM: CPT | Mod: 25

## 2018-11-29 PROCEDURE — 96361 HYDRATE IV INFUSION ADD-ON: CPT

## 2018-11-29 PROCEDURE — 80307 DRUG TEST PRSMV CHEM ANLYZR: CPT

## 2018-11-29 PROCEDURE — 85025 COMPLETE CBC W/AUTO DIFF WBC: CPT

## 2018-11-29 PROCEDURE — 80320 DRUG SCREEN QUANTALCOHOLS: CPT

## 2018-11-29 PROCEDURE — 83690 ASSAY OF LIPASE: CPT

## 2018-11-29 PROCEDURE — 80053 COMPREHEN METABOLIC PANEL: CPT

## 2018-11-29 PROCEDURE — 81003 URINALYSIS AUTO W/O SCOPE: CPT | Mod: 59

## 2018-11-29 RX ORDER — PANTOPRAZOLE SODIUM 40 MG/1
40 TABLET, DELAYED RELEASE ORAL
Status: COMPLETED | OUTPATIENT
Start: 2018-11-29 | End: 2018-11-29

## 2018-11-29 RX ORDER — FAMOTIDINE 20 MG/1
20 TABLET, FILM COATED ORAL
Status: COMPLETED | OUTPATIENT
Start: 2018-11-29 | End: 2018-11-29

## 2018-11-29 RX ADMIN — FAMOTIDINE 20 MG: 20 TABLET ORAL at 10:11

## 2018-11-29 RX ADMIN — SODIUM CHLORIDE 1000 ML: 0.9 INJECTION, SOLUTION INTRAVENOUS at 09:11

## 2018-11-29 RX ADMIN — PANTOPRAZOLE SODIUM 40 MG: 40 TABLET, DELAYED RELEASE ORAL at 10:11

## 2018-11-29 RX ADMIN — LIDOCAINE HYDROCHLORIDE: 20 SOLUTION ORAL; TOPICAL at 08:11

## 2018-11-30 NOTE — ED TRIAGE NOTES
33 y.o. Female presents to the ED with chief complaint of abdominal pain and vomiting. Patient states abdominal pain began earlier today with associated nausea and vomiting. Patient states pain was not resolved with OTC pain medication. Patient denies recent injury. Patient resting in bed in NAD. Side rails up x2.

## 2018-11-30 NOTE — ED PROVIDER NOTES
"Encounter Date: 11/29/2018    SCRIBE #1 NOTE: I, Zainab Delacruzjoséwaeagle, am scribing for, and in the presence of,  Mariia Santos MD. I have scribed the following portions of the note - Other sections scribed: HPI, ROS and PE.       History     Chief Complaint   Patient presents with    Abdominal Pain     Pt states, "I'm having pain in my pancreas and vomiting x 2 days.  I've had pancreatitis a few times".        Vomiting     CC: Abdominal Pain    HPI: This 33 y.o. Female, with a medical history of anxiety, bladder infection, depression, hypertension, migraine headache, pancreatitis, lower urinary tract infection and a surgical history of cholecystectomy, presents to the ED c/o acute, constant, severe (9/10) abdominal pain that began yesterday. Pt reports that she is experiencing pain "in my pancreas", noting that the symptoms feel similar to her past pancreatitis flares. She states that she typically experiences a flare every couple of months, noting that the last episode occurred a couple of weeks ago. Pt additionally reports experiencing nausea, emesis (for the past few days) and diarrhea (yesterday). No sick contact. Pt denies recent alcohol consumption. No other associated symptoms. No alleviating factors.    Denies pregnancy.  Patient reports endoscopy however I do not see this on [facility] or [facility] records.  She has had her gallbladder taken out in 2012.  She has been to [facility] on November 12th and November 18th of this year.  On November 18 she had a CT scan was unremarkable with a white count of 19.  On the 12 she had a white count of 17.  Her CT scan on November 12th was normal.  She has had normal lipase is both times.      The history is provided by the patient. No  was used.     Review of patient's allergies indicates:   Allergen Reactions    Compazine [prochlorperazine edisylate] Anxiety    Fioricet [butalbital-acetaminophen-caff] Other (See Comments)     Reaction:  "Really bad yeast " "infections"    Reglan [metoclopramide] Rash     Past Medical History:   Diagnosis Date    Anxiety     Bladder infection     Carpal tunnel syndrome     Depression     Hypertension     Migraine headache     Pancreatitis     UTI (lower urinary tract infection)      Past Surgical History:   Procedure Laterality Date    CHOLECYSTECTOMY      CHOLECYSTECTOMY, LAPAROSCOPIC N/A 12/21/2012    Performed by [facility] at [facility] OR    KNEE SURGERY   aug 2008    right knee    KNEE SURGERY      REDUCTION MAMMAPLASTY      Breast reduction     Family History   Problem Relation Age of Onset    Hypertension Mother     Hypertension Father     Hypertension Maternal Grandmother     Breast cancer Neg Hx     Colon cancer Neg Hx     Ovarian cancer Neg Hx      Social History     Tobacco Use    Smoking status: Current Every Day Smoker     Packs/day: 0.50     Years: 8.00     Pack years: 4.00     Types: Cigarettes    Smokeless tobacco: Never Used    Tobacco comment: Pt states she smokes 1 pack of cigarettes in 3 days.   Substance Use Topics    Alcohol use: No    Drug use: No     Review of Systems   Constitutional: Negative for chills and fever.   HENT: Negative for congestion, ear pain, rhinorrhea and sore throat.    Eyes: Negative for pain and visual disturbance.   Respiratory: Negative for cough and shortness of breath.    Cardiovascular: Negative for chest pain.   Gastrointestinal: Positive for abdominal pain, diarrhea, nausea and vomiting.   Genitourinary: Negative for dysuria.   Musculoskeletal: Negative for back pain and neck pain.   Skin: Negative for rash.   Neurological: Negative for headaches.   All other systems reviewed and are negative.      Physical Exam     Initial Vitals [11/29/18 2021]   BP Pulse Resp Temp SpO2   (!) 151/109 (!) 126 16 99 °F (37.2 °C) 97 %      MAP       --         Physical Exam    Nursing note and vitals reviewed.  Constitutional: She appears well-developed and well-nourished.  " Non-toxic appearance. She does not appear ill.   HENT:   Head: Normocephalic and atraumatic.   Eyes: EOM are normal.   Neck: Neck supple.   Cardiovascular: Normal rate, regular rhythm and normal heart sounds.   No murmur heard.  Pulmonary/Chest: Effort normal and breath sounds normal. No respiratory distress.   Abdominal: Soft. Normal appearance and bowel sounds are normal. She exhibits no distension. There is no tenderness.   No tenderness. No pain on palpation   Musculoskeletal: Normal range of motion.   Neurological: She is alert.   Skin: Skin is warm and dry.   Psychiatric: She has a normal mood and affect.         ED Course   Procedures  Labs Reviewed   CBC W/ AUTO DIFFERENTIAL   COMPREHENSIVE METABOLIC PANEL   LIPASE   URINALYSIS   ALCOHOL,MEDICAL (ETHANOL)   DRUG SCREEN PANEL, URINE EMERGENCY   POCT URINE PREGNANCY          Imaging Results    None                     Scribe Attestation:   Scribe #1: I performed the above scribed service and the documentation accurately describes the services I performed. I attest to the accuracy of the note.    Attending Attestation:           Physician Attestation for Scribe:  Physician Attestation Statement for Scribe #1: I, Mariia Santos MD, reviewed documentation, as scribed by Zainab Goldstein in my presence, and it is both accurate and complete.         Attending ED Notes:   The patient has history of pancreatitis per patient in the past.  She has been [facility] twice this month had 2-CT scans in normal lipases.  She denies alcohol abuse.  Will give this patient GI cocktail reassess also check labs.  She has had gallbladder taken out already.    Patient is tolerating p.o..  She is not vomiting.  Workup was unremarkable.  He has had 2 CT scans this week.  Her lipase is normal. Patient is not a chronic drinker.  She has no ttp on exam    We will give her Pepcid Protonix twice a day as directed over-the-counter before  dinner and breakfast.  She will return for any worsening  or concerns.  She will follow up with GI.    Blood pressure improved.  Heart rate 98.  No fever.             Clinical Impression: chronic Abdominal pain   There were no encounter diagnoses.                             Mariia Santos MD  11/29/18 2100       Mariia Santos MD  11/29/18 2210       Mariia Santos MD  11/29/18 2211

## 2018-11-30 NOTE — DISCHARGE INSTRUCTIONS
Pepcid and Protonix over-the-counter as directed 1 hr before breakfast and dinner.  Please return for any worsening or concerns.  Please follow up with [facility] gastroenterology clinic.   workup here was unremarkable.  Mylanta Maalox for breakthrough pain.

## 2018-12-08 ENCOUNTER — HOSPITAL ENCOUNTER (EMERGENCY)
Facility: HOSPITAL | Age: 33
Discharge: HOME | End: 2018-12-08
Attending: EMERGENCY MEDICINE
Payer: MEDICAID

## 2018-12-08 VITALS
OXYGEN SATURATION: 99 % | DIASTOLIC BLOOD PRESSURE: 106 MMHG | SYSTOLIC BLOOD PRESSURE: 163 MMHG | HEIGHT: 62 IN | BODY MASS INDEX: 34.04 KG/M2 | WEIGHT: 185 LBS | RESPIRATION RATE: 20 BRPM | TEMPERATURE: 98 F | HEART RATE: 114 BPM

## 2018-12-08 DIAGNOSIS — G43.909 MIGRAINE WITHOUT STATUS MIGRAINOSUS, NOT INTRACTABLE, UNSPECIFIED MIGRAINE TYPE: Primary | ICD-10-CM

## 2018-12-08 LAB
AMPHET+METHAMPHET UR QL: NORMAL
B-HCG UR QL: NEGATIVE
BACTERIA #/AREA URNS HPF: ABNORMAL /HPF
BARBITURATES UR QL SCN>200 NG/ML: NEGATIVE
BENZODIAZ UR QL SCN>200 NG/ML: NORMAL
BILIRUB UR QL STRIP: NEGATIVE
BZE UR QL SCN: NEGATIVE
CANNABINOIDS UR QL SCN: NEGATIVE
CLARITY UR: ABNORMAL
COLOR UR: YELLOW
CREAT UR-MCNC: 147.1 MG/DL
CTP QC/QA: YES
GLUCOSE UR QL STRIP: NEGATIVE
HGB UR QL STRIP: ABNORMAL
HYALINE CASTS #/AREA URNS LPF: ABNORMAL /LPF
KETONES UR QL STRIP: NEGATIVE
LEUKOCYTE ESTERASE UR QL STRIP: ABNORMAL
METHADONE UR QL SCN>300 NG/ML: NEGATIVE
MICROSCOPIC COMMENT: ABNORMAL
NITRITE UR QL STRIP: NEGATIVE
OPIATES UR QL SCN: NEGATIVE
PCP UR QL SCN>25 NG/ML: NEGATIVE
PH UR STRIP: 5 [PH] (ref 5–8)
PROT UR QL STRIP: ABNORMAL
RBC #/AREA URNS HPF: >100 /HPF (ref 0–4)
SP GR UR STRIP: 1.02 (ref 1–1.03)
SQUAMOUS #/AREA URNS HPF: ABNORMAL /HPF
TOXICOLOGY INFORMATION: NORMAL
URN SPEC COLLECT METH UR: ABNORMAL
UROBILINOGEN UR STRIP-ACNC: NEGATIVE EU/DL
WBC #/AREA URNS HPF: 5 /HPF (ref 0–5)

## 2018-12-08 PROCEDURE — 82962 GLUCOSE BLOOD TEST: CPT

## 2018-12-08 PROCEDURE — 96372 THER/PROPH/DIAG INJ SC/IM: CPT

## 2018-12-08 PROCEDURE — 81025 URINE PREGNANCY TEST: CPT | Performed by: EMERGENCY MEDICINE

## 2018-12-08 PROCEDURE — 99284 EMERGENCY DEPT VISIT MOD MDM: CPT | Mod: 25

## 2018-12-08 PROCEDURE — 81000 URINALYSIS NONAUTO W/SCOPE: CPT | Mod: 59

## 2018-12-08 PROCEDURE — 63600175 PHARM REV CODE 636 W HCPCS: Performed by: EMERGENCY MEDICINE

## 2018-12-08 PROCEDURE — 80307 DRUG TEST PRSMV CHEM ANLYZR: CPT

## 2018-12-08 PROCEDURE — 25000003 PHARM REV CODE 250: Performed by: EMERGENCY MEDICINE

## 2018-12-08 RX ORDER — DIPHENHYDRAMINE HCL 25 MG
25 CAPSULE ORAL
Status: COMPLETED | OUTPATIENT
Start: 2018-12-08 | End: 2018-12-08

## 2018-12-08 RX ORDER — HALOPERIDOL 5 MG/ML
2.5 INJECTION INTRAMUSCULAR
Status: COMPLETED | OUTPATIENT
Start: 2018-12-08 | End: 2018-12-08

## 2018-12-08 RX ADMIN — DIPHENHYDRAMINE HYDROCHLORIDE 25 MG: 25 CAPSULE ORAL at 07:12

## 2018-12-08 RX ADMIN — HALOPERIDOL LACTATE 2.5 MG: 5 INJECTION, SOLUTION INTRAMUSCULAR at 07:12

## 2018-12-09 LAB — POCT GLUCOSE: 102 MG/DL (ref 70–110)

## 2018-12-09 NOTE — ED PROVIDER NOTES
"Encounter Date: 12/8/2018    SCRIBE #1 NOTE: I, Herberth Faustin, am scribing for, and in the presence of,  Mariia Santos MD . I have scribed the following portions of the note - Other sections scribed: HPI, ROS, PE.       History     Chief Complaint   Patient presents with    Migraine     Pt reports a migraine that started around 4pm today. Pt reports taking a Xanax and a nap which was not effective with relieving her migraine. Pt reports nausea & dizziness, chest pain and new onset facial numbness in addition to her migraine. Denies SOB. Pt with hx of HTN, anxiety and migraines. Pt rates her headache a 10/10 on scale.      CC: Migraine     This 33 y.o Female smoker with pmhx of migraines, anxiety, and HTN presents to the ED c/o a migraine. She says that it is like her other migraines ;she also has chest tightness/palpitations , facial numbness and feels like "my face is hot". She states that for pain management at home she uses Excedrin migraine and xanax if needed. Pt notes that she took adderal yesterday morning. Pt denies sick contact, drug use and possibility of pregnancy. Pt denies fever and chills.     Patient denies pregnancy.  Headache consistent with all previous.  Not worst of life.  Not thunderclap.  She also reports her face feels hot and some slight palpitations.  She suffers from anxiety.    No cardiac or pulmonary embolism risk factors.  Shortness of breath.  No chest pain.      The history is provided by the patient. No  was used.     Review of patient's allergies indicates:   Allergen Reactions    Compazine [prochlorperazine edisylate] Anxiety    Fioricet [butalbital-acetaminophen-caff] Other (See Comments)     Reaction:  "Really bad yeast infections"    Reglan [metoclopramide] Rash     Past Medical History:   Diagnosis Date    Anxiety     Bladder infection     Carpal tunnel syndrome     Depression     Hypertension     Migraine headache     Pancreatitis     UTI (lower " urinary tract infection)      Past Surgical History:   Procedure Laterality Date    CHOLECYSTECTOMY      CHOLECYSTECTOMY, LAPAROSCOPIC N/A 12/21/2012    [facility]    KNEE SURGERY   aug 2008    right knee    KNEE SURGERY      REDUCTION MAMMAPLASTY      Breast reduction     Family History   Problem Relation Age of Onset    Hypertension Mother     Hypertension Father     Hypertension Maternal Grandmother     Breast cancer Neg Hx     Colon cancer Neg Hx     Ovarian cancer Neg Hx      Social History     Tobacco Use    Smoking status: Current Every Day Smoker     Packs/day: 0.50     Years: 8.00     Pack years: 4.00     Types: Cigarettes    Smokeless tobacco: Never Used    Tobacco comment: Pt states she smokes 1 pack of cigarettes in 3 days.   Substance Use Topics    Alcohol use: No    Drug use: No     Review of Systems   Constitutional: Negative for appetite change, chills and fever.   HENT: Negative for rhinorrhea and sore throat.    Eyes: Negative for visual disturbance.   Respiratory: Positive for chest tightness. Negative for cough and shortness of breath.    Cardiovascular: Negative for chest pain.   Gastrointestinal: Negative for abdominal pain and nausea.   Genitourinary: Negative for dysuria.   Musculoskeletal: Negative for gait problem.   Skin: Negative for rash.   Neurological: Positive for numbness and headaches.       Physical Exam     Initial Vitals [12/08/18 1910]   BP Pulse Resp Temp SpO2   (!) 163/106 (!) 114 20 98.3 °F (36.8 °C) 99 %      MAP       --         Physical Exam    Nursing note and vitals reviewed.  Constitutional: She is not diaphoretic. No distress.   HENT:   Head: Normocephalic and atraumatic.   Mouth/Throat: Oropharynx is clear and moist.   Eyes: EOM are normal. Pupils are equal, round, and reactive to light. No scleral icterus.   Neck: Normal range of motion. Neck supple. No JVD present.   Cardiovascular: Normal rate and regular rhythm.    Pulmonary/Chest: Breath sounds normal. No stridor. No respiratory distress.   Abdominal: Soft. Bowel sounds are normal. She exhibits no distension. There is no tenderness.   Musculoskeletal: Normal range of motion. She exhibits no edema or tenderness.   Neurological: She is alert and oriented to person, place, and time. She has normal strength. No cranial nerve deficit or sensory deficit.   Skin: Skin is warm and dry.   Psychiatric: She has a normal mood and affect.         ED Course   Procedures  Labs Reviewed   POCT URINE PREGNANCY   POCT GLUCOSE MONITORING CONTINUOUS     EKG Readings: (Independently Interpreted)   Normal sinus tachycardia 104 with no STEMI.  No inverted T-waves.  No S1 q.3 T3.       Imaging Results    None                     Scribe Attestation:   Scribe #1: I performed the above scribed service and the documentation accurately describes the services I performed. I attest to the accuracy of the note.    Attending Attestation:           Physician Attestation for Scribe:  Physician Attestation Statement for Scribe #1: I, Mariia Santos MD, reviewed documentation, as scribed by Herberth Faustin in my presence, and it is both accurate and complete.         Attending ED Notes:   Patient with headache consistent with all previous.  Not worst of life not thunderclap.  We will give this patient migraine medication and reassess.      The patient walked out with steady gait.  She reports she needs to go home.  Patient has eloped.             Clinical Impression:  Migraine headache   There were no encounter diagnoses.                             Mariia Santos MD  12/08/18 1943       Mariia Santos MD  12/08/18 2037

## 2018-12-10 ENCOUNTER — HOSPITAL ENCOUNTER (EMERGENCY)
Facility: HOSPITAL | Age: 33
Discharge: HOME | End: 2018-12-11
Attending: EMERGENCY MEDICINE
Payer: MEDICAID

## 2018-12-10 DIAGNOSIS — K21.9 GASTROESOPHAGEAL REFLUX DISEASE, ESOPHAGITIS PRESENCE NOT SPECIFIED: Primary | ICD-10-CM

## 2018-12-10 LAB
ALBUMIN SERPL-MCNC: 3.4 G/DL (ref 3.3–5.5)
ALBUMIN SERPL-MCNC: 3.5 G/DL (ref 3.3–5.5)
ALP SERPL-CCNC: 57 U/L (ref 42–141)
ALP SERPL-CCNC: 58 U/L (ref 42–141)
B-HCG UR QL: NEGATIVE
BILIRUB SERPL-MCNC: 0.6 MG/DL (ref 0.2–1.6)
BILIRUB SERPL-MCNC: 0.6 MG/DL (ref 0.2–1.6)
BILIRUBIN, POC UA: NEGATIVE
BLOOD, POC UA: ABNORMAL
BUN SERPL-MCNC: 16 MG/DL (ref 7–22)
CALCIUM SERPL-MCNC: 9.9 MG/DL (ref 8–10.3)
CHLORIDE SERPL-SCNC: 101 MMOL/L (ref 98–108)
CLARITY, POC UA: ABNORMAL
COLOR, POC UA: ABNORMAL
CREAT SERPL-MCNC: 0.6 MG/DL (ref 0.6–1.2)
CTP QC/QA: YES
GLUCOSE SERPL-MCNC: 135 MG/DL (ref 73–118)
GLUCOSE, POC UA: NEGATIVE
KETONES, POC UA: NEGATIVE
LEUKOCYTE EST, POC UA: ABNORMAL
LIPASE SERPL-CCNC: 13 U/L
NITRITE, POC UA: NEGATIVE
PH UR STRIP: 6 [PH]
POC ALT (SGPT): 24 U/L (ref 10–47)
POC ALT (SGPT): 26 U/L (ref 10–47)
POC AMYLASE: 33 U/L (ref 14–97)
POC AST (SGOT): 30 U/L (ref 11–38)
POC AST (SGOT): 39 U/L (ref 11–38)
POC GGT: 29 U/L (ref 5–65)
POC TCO2: 26 MMOL/L (ref 18–33)
POTASSIUM BLD-SCNC: 3.6 MMOL/L (ref 3.6–5.1)
PROTEIN, POC UA: ABNORMAL
PROTEIN, POC: 7.6 G/DL (ref 6.4–8.1)
PROTEIN, POC: 7.9 G/DL (ref 6.4–8.1)
SODIUM BLD-SCNC: 141 MMOL/L (ref 128–145)
SPECIFIC GRAVITY, POC UA: 1.02
UROBILINOGEN, POC UA: 0.2 E.U./DL

## 2018-12-10 PROCEDURE — 81025 URINE PREGNANCY TEST: CPT | Performed by: EMERGENCY MEDICINE

## 2018-12-10 PROCEDURE — 81003 URINALYSIS AUTO W/O SCOPE: CPT

## 2018-12-10 PROCEDURE — 83690 ASSAY OF LIPASE: CPT

## 2018-12-10 PROCEDURE — 99284 EMERGENCY DEPT VISIT MOD MDM: CPT | Mod: 25

## 2018-12-10 PROCEDURE — 82150 ASSAY OF AMYLASE: CPT

## 2018-12-10 PROCEDURE — 96374 THER/PROPH/DIAG INJ IV PUSH: CPT

## 2018-12-10 PROCEDURE — 25000003 PHARM REV CODE 250: Performed by: EMERGENCY MEDICINE

## 2018-12-10 PROCEDURE — 96361 HYDRATE IV INFUSION ADD-ON: CPT

## 2018-12-10 PROCEDURE — 80053 COMPREHEN METABOLIC PANEL: CPT

## 2018-12-10 PROCEDURE — 63600175 PHARM REV CODE 636 W HCPCS: Performed by: EMERGENCY MEDICINE

## 2018-12-10 PROCEDURE — 85025 COMPLETE CBC W/AUTO DIFF WBC: CPT

## 2018-12-10 PROCEDURE — 96375 TX/PRO/DX INJ NEW DRUG ADDON: CPT

## 2018-12-10 RX ORDER — SODIUM CHLORIDE 9 MG/ML
1000 INJECTION, SOLUTION INTRAVENOUS
Status: COMPLETED | OUTPATIENT
Start: 2018-12-10 | End: 2018-12-10

## 2018-12-10 RX ORDER — HYDROMORPHONE HYDROCHLORIDE 2 MG/ML
1 INJECTION, SOLUTION INTRAMUSCULAR; INTRAVENOUS; SUBCUTANEOUS
Status: COMPLETED | OUTPATIENT
Start: 2018-12-10 | End: 2018-12-10

## 2018-12-10 RX ORDER — ONDANSETRON 4 MG/1
4 TABLET, FILM COATED ORAL EVERY 6 HOURS
Qty: 12 TABLET | Refills: 0 | Status: SHIPPED | OUTPATIENT
Start: 2018-12-10 | End: 2019-02-12

## 2018-12-10 RX ORDER — ONDANSETRON 2 MG/ML
4 INJECTION INTRAMUSCULAR; INTRAVENOUS
Status: COMPLETED | OUTPATIENT
Start: 2018-12-10 | End: 2018-12-10

## 2018-12-10 RX ADMIN — HYDROMORPHONE HYDROCHLORIDE 1 MG: 2 INJECTION, SOLUTION INTRAMUSCULAR; INTRAVENOUS; SUBCUTANEOUS at 09:12

## 2018-12-10 RX ADMIN — SODIUM CHLORIDE 1000 ML: 0.9 INJECTION, SOLUTION INTRAVENOUS at 09:12

## 2018-12-10 RX ADMIN — ONDANSETRON 4 MG: 2 INJECTION INTRAMUSCULAR; INTRAVENOUS at 09:12

## 2018-12-11 VITALS
SYSTOLIC BLOOD PRESSURE: 115 MMHG | BODY MASS INDEX: 34.04 KG/M2 | HEART RATE: 73 BPM | WEIGHT: 185 LBS | DIASTOLIC BLOOD PRESSURE: 60 MMHG | TEMPERATURE: 98 F | HEIGHT: 62 IN | OXYGEN SATURATION: 99 % | RESPIRATION RATE: 17 BRPM

## 2018-12-11 RX ORDER — TRAMADOL HYDROCHLORIDE 50 MG/1
50 TABLET ORAL EVERY 6 HOURS PRN
Qty: 12 TABLET | Refills: 0 | Status: SHIPPED | OUTPATIENT
Start: 2018-12-11 | End: 2018-12-21

## 2018-12-11 NOTE — ED PROVIDER NOTES
"Encounter Date: 12/10/2018    SCRIBE #1 NOTE: I, Sandhya Kirkland, am scribing for, and in the presence of,  Dr. Hodges. I have scribed the following portions of the note - Other sections scribed: HPI, ROS, PE.       History     Chief Complaint   Patient presents with    Abdominal Pain     c/o abd pain with radiation to back with N/V/D, "like the last time i had pancreatitis"(2013)     33 y.o female presents with abdominal pain and N/V/D that started earlier today. She has a hx of pancreatitis and says that this pain is similar to the last time she had it. She is unable to eat today due to her symptoms. She has had her gallbladder removed.       The history is provided by the patient.     Review of patient's allergies indicates:   Allergen Reactions    Compazine [prochlorperazine edisylate] Anxiety    Fioricet [butalbital-acetaminophen-caff] Other (See Comments)     Reaction:  "Really bad yeast infections"    Reglan [metoclopramide] Rash     Past Medical History:   Diagnosis Date    Anxiety     Bladder infection     Carpal tunnel syndrome     Depression     Hypertension     Migraine headache     Pancreatitis     UTI (lower urinary tract infection)      Past Surgical History:   Procedure Laterality Date    CHOLECYSTECTOMY      CHOLECYSTECTOMY, LAPAROSCOPIC N/A 12/21/2012    Performed by Zef[facility]ino Daugherty [facility] at [facility] OR    JOINT REPLACEMENT      KNEE SURGERY   aug 2008    right knee    KNEE SURGERY      REDUCTION MAMMAPLASTY      Breast reduction     Family History   Problem Relation Age of Onset    Hypertension Mother     Hypertension Father     Hypertension Maternal Grandmother     Breast cancer Neg Hx     Colon cancer Neg Hx     Ovarian cancer Neg Hx      Social History     Tobacco Use    Smoking status: Current Every Day Smoker     Packs/day: 0.50     Years: 8.00     Pack years: 4.00     Types: Cigarettes    Smokeless tobacco: Never Used    Tobacco comment: Pt states she smokes 1 pack of " cigarettes in 3 days.   Substance Use Topics    Alcohol use: No    Drug use: No     Review of Systems   Gastrointestinal: Positive for abdominal pain, diarrhea, nausea and vomiting.   All other systems reviewed and are negative.      Physical Exam     Initial Vitals [12/10/18 1930]   BP Pulse Resp Temp SpO2   (!) 150/104 87 18 97.8 °F (36.6 °C) 99 %      MAP       --         Physical Exam    Nursing note and vitals reviewed.  Constitutional: She appears well-developed and well-nourished. She is not diaphoretic. No distress.   HENT:   Head: Normocephalic and atraumatic.   Eyes: EOM are normal.   Pulmonary/Chest: No respiratory distress.   Abdominal: Soft. She exhibits no distension. There is no tenderness. There is no rebound and no guarding.   Musculoskeletal: Normal range of motion.   Neurological: She is alert and oriented to person, place, and time.   Skin: Skin is warm and dry. Capillary refill takes less than 2 seconds.   Psychiatric: She has a normal mood and affect. Her behavior is normal.         ED Course   Procedures  Labs Reviewed   POCT URINALYSIS W/O SCOPE - Abnormal; Notable for the following components:       Result Value    Glucose, UA Negative (*)     Bilirubin, UA Negative (*)     Ketones, UA Negative (*)     Blood, UA 3+ (*)     Protein, UA Trace (*)     Nitrite, UA Negative (*)     Leukocytes, UA 1+ (*)     All other components within normal limits   POCT LIVER PANEL - Abnormal; Notable for the following components:    AST (SGOT), POC 39 (*)     All other components within normal limits   POCT CMP - Abnormal; Notable for the following components:    POC Creatinine 0.6 (*)     POC Glucose 135 (*)     All other components within normal limits   LIPASE   POCT URINE PREGNANCY   POCT URINALYSIS W/O SCOPE   POCT CBC   POCT CMP   POCT AMYLASE          Imaging Results    None          Medical Decision Making:   Clinical Tests:   Lab Tests: Ordered and Reviewed  ED Management:  This patient is has  symptoms have completely resolved.  Her presenting symptomatology was initially concerning for pancreatitis however her lipase is normal and remainder of her laboratory data is unremarkable.  Feel the patient presentation is consistent with GERD subsequently treat her based upon the diagnostic evaluation workup that I completed in the emergency department.        Results for orders placed or performed during the hospital encounter of 12/10/18   Lipase   Result Value Ref Range    Lipase 13 4 - 60 U/L   POCT urine pregnancy   Result Value Ref Range    POC Preg Test, Ur Negative Negative     Acceptable Yes    POCT URINALYSIS W/O SCOPE   Result Value Ref Range    Glucose, UA Negative (NG)     Bilirubin, UA Negative (NG)     Ketones, UA Negative (NG)     Spec Grav UA 1.025     Blood, UA 3+ (A)     PH, UA 6.0     Protein, UA Trace (A)     Urobilinogen, UA 0.2 E.U./dL    Nitrite, UA Negative (NG)     Leukocytes, UA 1+ (A)     Color, UA Dark yellow     Clarity, UA Slightly Cloudy    POCT Liver Panel   Result Value Ref Range    Albumin, POC 3.5 3.3 - 5.5 g/dL    Alkaline Phosphatase, POC 57 42 - 141 U/L    ALT (SGPT), POC 26 10 - 47 U/L    Amylase, POC 33 14 - 97 U/L    AST (SGOT), POC 39 (H) 11 - 38 U/L    POC GGT 29 5 - 65 U/L    Bilirubin 0.6 0.2 - 1.6 mg/dL    Protein 7.9 6.4 - 8.1 g/dL   POCT CMP   Result Value Ref Range    Albumin, POC 3.4 3.3 - 5.5 g/dL    Alkaline Phosphatase, POC 58 42 - 141 U/L    ALT (SGPT), POC 24 10 - 47 U/L    AST (SGOT), POC 30 11 - 38 U/L    POC BUN 16 7 - 22 mg/dL    Calcium, POC 9.9 8.0 - 10.3 mg/dL    POC Chloride 101 98 - 108 mmol/L    POC Creatinine 0.6 (L) 0.6 - 1.2 mg/dL    POC Glucose 135 (H) 73 - 118 mg/dL    POC Potassium 3.6 3.6 - 5.1 mmol/L    POC Sodium 141 128 - 145 mmol/L    Bilirubin 0.6 0.2 - 1.6 mg/dL    POC TCO2 26 18 - 33 mmol/L    Protein 7.6 6.4 - 8.1 g/dL            Scribe Attestation:   Scribe #1: I performed the above scribed service and the  documentation accurately describes the services I performed. I attest to the accuracy of the note.    This document was produced by a scribe under my direction and in my presence. I agree with the content of the note and have made any necessary edits.     Zeferino Hodges MD    12/10/2018 11:06 PM           Clinical Impression:     1. Gastroesophageal reflux disease, esophagitis presence not specified                                 Zeferino Hodges MD  12/10/18 1242

## 2018-12-19 ENCOUNTER — HOSPITAL ENCOUNTER (EMERGENCY)
Facility: HOSPITAL | Age: 33
Discharge: HOME | End: 2018-12-19
Attending: EMERGENCY MEDICINE
Payer: MEDICAID

## 2018-12-19 VITALS
WEIGHT: 185 LBS | DIASTOLIC BLOOD PRESSURE: 113 MMHG | RESPIRATION RATE: 19 BRPM | OXYGEN SATURATION: 99 % | BODY MASS INDEX: 34.04 KG/M2 | HEART RATE: 101 BPM | HEIGHT: 62 IN | SYSTOLIC BLOOD PRESSURE: 185 MMHG | TEMPERATURE: 98 F

## 2018-12-19 DIAGNOSIS — S51.812A LACERATION OF LEFT FOREARM, INITIAL ENCOUNTER: Primary | ICD-10-CM

## 2018-12-19 LAB
B-HCG UR QL: NEGATIVE
CTP QC/QA: YES

## 2018-12-19 PROCEDURE — 81025 URINE PREGNANCY TEST: CPT | Performed by: EMERGENCY MEDICINE

## 2018-12-19 PROCEDURE — 90715 TDAP VACCINE 7 YRS/> IM: CPT | Performed by: EMERGENCY MEDICINE

## 2018-12-19 PROCEDURE — 99284 EMERGENCY DEPT VISIT MOD MDM: CPT | Mod: 25

## 2018-12-19 PROCEDURE — 25000003 PHARM REV CODE 250: Performed by: EMERGENCY MEDICINE

## 2018-12-19 PROCEDURE — 90471 IMMUNIZATION ADMIN: CPT | Performed by: EMERGENCY MEDICINE

## 2018-12-19 PROCEDURE — 63600175 PHARM REV CODE 636 W HCPCS: Performed by: EMERGENCY MEDICINE

## 2018-12-19 PROCEDURE — 12001 RPR S/N/AX/GEN/TRNK 2.5CM/<: CPT

## 2018-12-19 RX ORDER — LIDOCAINE HYDROCHLORIDE 10 MG/ML
5 INJECTION INFILTRATION; PERINEURAL
Status: COMPLETED | OUTPATIENT
Start: 2018-12-19 | End: 2018-12-19

## 2018-12-19 RX ADMIN — LIDOCAINE HYDROCHLORIDE 5 ML: 10 INJECTION, SOLUTION INFILTRATION; PERINEURAL at 07:12

## 2018-12-19 RX ADMIN — CLOSTRIDIUM TETANI TOXOID ANTIGEN (FORMALDEHYDE INACTIVATED), CORYNEBACTERIUM DIPHTHERIAE TOXOID ANTIGEN (FORMALDEHYDE INACTIVATED), BORDETELLA PERTUSSIS TOXOID ANTIGEN (GLUTARALDEHYDE INACTIVATED), BORDETELLA PERTUSSIS FILAMENTOUS HEMAGGLUTININ ANTIGEN (FORMALDEHYDE INACTIVATED), BORDETELLA PERTUSSIS PERTACTIN ANTIGEN, AND BORDETELLA PERTUSSIS FIMBRIAE 2/3 ANTIGEN 0.5 ML: 5; 2; 2.5; 5; 3; 5 INJECTION, SUSPENSION INTRAMUSCULAR at 07:12

## 2018-12-20 NOTE — ED PROVIDER NOTES
"Encounter Date: 12/19/2018    SCRIBE #1 NOTE: I, Cait Lock, am scribing for, and in the presence of,  Dr. Hodges. I have scribed the following portions of the note - Other sections scribed: HPI, ROS, PE.       History     Chief Complaint   Patient presents with    Laceration     pt reports about 20 minutes PTA she cut her left forearm with 's fish blade. laceration noted to left forearm. bleeding controlled at this time     SORT 6:36 PM: This is a 33 year old female who presents to the ED complaining of a laceration to her left forearm with a fishing blade just PTA. She was pulling a knife out the drawer when the blade accidentally sliced her arm. She thinks she had a tetanus vaccination in 2012, but is unsure.       The history is provided by the patient. No  was used.     Review of patient's allergies indicates:   Allergen Reactions    Compazine [prochlorperazine edisylate] Anxiety    Fioricet [butalbital-acetaminophen-caff] Other (See Comments)     Reaction:  "Really bad yeast infections"    Reglan [metoclopramide] Rash     Past Medical History:   Diagnosis Date    Anxiety     Bladder infection     Carpal tunnel syndrome     Depression     Hypertension     Migraine headache     Pancreatitis     UTI (lower urinary tract infection)      Past Surgical History:   Procedure Laterality Date    CHOLECYSTECTOMY      CHOLECYSTECTOMY, LAPAROSCOPIC N/A 12/21/2012    Performed by [facility]ef[facility] at [facility] OR    JOINT REPLACEMENT      KNEE SURGERY   aug 2008    right knee    KNEE SURGERY      REDUCTION MAMMAPLASTY      Breast reduction     Family History   Problem Relation Age of Onset    Hypertension Mother     Hypertension Father     Hypertension Maternal Grandmother     Breast cancer Neg Hx     Colon cancer Neg Hx     Ovarian cancer Neg Hx      Social History     Tobacco Use    Smoking status: Current Every Day Smoker     Packs/day: 0.50     Years: 8.00     " Pack years: 4.00     Types: Cigarettes    Smokeless tobacco: Never Used    Tobacco comment: Pt states she smokes 1 pack of cigarettes in 3 days.   Substance Use Topics    Alcohol use: No    Drug use: No     Review of Systems   Constitutional: Negative.  Negative for fever.   HENT: Negative.  Negative for sore throat.    Eyes: Negative.    Respiratory: Negative.  Negative for shortness of breath.    Cardiovascular: Negative.  Negative for chest pain.   Gastrointestinal: Negative.  Negative for nausea and vomiting.   Endocrine: Negative.    Genitourinary: Negative.  Negative for dysuria.   Musculoskeletal: Negative.  Negative for myalgias.   Skin: Positive for wound. Negative for rash.   Allergic/Immunologic: Negative.    Neurological: Negative.  Negative for headaches.   Hematological: Negative.  Negative for adenopathy.   Psychiatric/Behavioral: Negative.  Negative for behavioral problems.   All other systems reviewed and are negative.      Physical Exam     Initial Vitals [12/19/18 1839]   BP Pulse Resp Temp SpO2   (!) 185/113 101 19 98.2 °F (36.8 °C) 99 %      MAP       --         Physical Exam    Nursing note and vitals reviewed.  Constitutional: She appears well-developed and well-nourished.   HENT:   Head: Normocephalic and atraumatic.   Right Ear: External ear normal.   Left Ear: External ear normal.   Nose: Nose normal.   Mouth/Throat: Oropharynx is clear and moist.   Eyes: Conjunctivae are normal.   Neck: Normal range of motion. Neck supple.   Cardiovascular: Normal rate, regular rhythm, normal heart sounds and intact distal pulses. Exam reveals no gallop and no friction rub.    No murmur heard.  Pulmonary/Chest: Breath sounds normal. No respiratory distress. She has no wheezes. She has no rhonchi. She has no rales. She exhibits no tenderness.   Abdominal: Soft. Bowel sounds are normal. She exhibits no distension and no mass. There is no tenderness. There is no rebound and no guarding.   Musculoskeletal:  Normal range of motion.        Left forearm: She exhibits laceration.   2.5 cm laceration down to fat on left forearm. FROM of left digits.   Neurological: She is alert and oriented to person, place, and time. She has normal strength. No sensory deficit.   Skin: Skin is warm and dry. Capillary refill takes less than 2 seconds. Laceration noted.   Psychiatric: She has a normal mood and affect. Her behavior is normal.         ED Course   Lac Repair  Date/Time: 12/19/2018 7:32 PM  Performed by: Zeferino Hodges MD  Authorized by: Zeferino Hodges MD   Consent Done: Emergent Situation  Body area: upper extremity  Location details: left lower arm  Laceration length: 2.5 cm  Foreign bodies: no foreign bodies  Tendon involvement: none  Nerve involvement: none  Vascular damage: no  Anesthesia: local infiltration    Anesthesia:  Local Anesthetic: lidocaine 1% without epinephrine  Anesthetic total: 4 mL  Patient sedated: no  Preparation: Patient was prepped and draped in the usual sterile fashion.  Irrigation solution: saline  Irrigation method: syringe  Amount of cleaning: standard  Debridement: none  Degree of undermining: none  Skin closure: 3-0 nylon  Number of sutures: 5  Technique: running  Approximation: close  Approximation difficulty: simple  Dressing: non-stick sterile dressing  Patient tolerance: Patient tolerated the procedure well with no immediate complications        Labs Reviewed   POCT URINE PREGNANCY          Imaging Results    None                     Scribe Attestation:   Scribe #1: I performed the above scribed service and the documentation accurately describes the services I performed. I attest to the accuracy of the note.    Attending Attestation:           Physician Attestation for Scribe:  Physician Attestation Statement for Scribe #1: I, Dr. Zeferino Hodges, reviewed documentation, as scribed by Cait Lock in my presence, and it is both accurate and complete.         This document was  produced by a scribe under my direction and in my presence. I agree with the content of the note and have made any necessary edits.     Zeferino Hodges MD    12/20/2018 6:38 AM           Clinical Impression:     1. Laceration of left forearm, initial encounter                                   Zeferino Hodges MD  12/20/18 0638

## 2018-12-20 NOTE — ED NOTES
Pt has 4 interrupted sutures to R forearm no bleeding at this time, wrapped with sterile gauze and covered with tape  '

## 2019-01-16 ENCOUNTER — OFFICE VISIT (OUTPATIENT)
Dept: URGENT CARE | Facility: CLINIC | Age: 34
End: 2019-01-16
Payer: MEDICAID

## 2019-01-16 VITALS
RESPIRATION RATE: 18 BRPM | OXYGEN SATURATION: 98 % | WEIGHT: 185 LBS | BODY MASS INDEX: 34.04 KG/M2 | TEMPERATURE: 99 F | HEIGHT: 62 IN | HEART RATE: 105 BPM | SYSTOLIC BLOOD PRESSURE: 165 MMHG | DIASTOLIC BLOOD PRESSURE: 90 MMHG

## 2019-01-16 DIAGNOSIS — J01.40 ACUTE NON-RECURRENT PANSINUSITIS: Primary | ICD-10-CM

## 2019-01-16 PROCEDURE — 99203 PR OFFICE/OUTPT VISIT, NEW, LEVL III, 30-44 MIN: ICD-10-PCS | Mod: S$GLB,,, | Performed by: NURSE PRACTITIONER

## 2019-01-16 PROCEDURE — 99203 OFFICE O/P NEW LOW 30 MIN: CPT | Mod: S$GLB,,, | Performed by: NURSE PRACTITIONER

## 2019-01-16 RX ORDER — AMOXICILLIN AND CLAVULANATE POTASSIUM 875; 125 MG/1; MG/1
1 TABLET, FILM COATED ORAL 2 TIMES DAILY
Qty: 20 TABLET | Refills: 0 | Status: SHIPPED | OUTPATIENT
Start: 2019-01-16 | End: 2019-01-26

## 2019-01-16 RX ORDER — DEXAMETHASONE SODIUM PHOSPHATE 100 MG/10ML
5 INJECTION INTRAMUSCULAR; INTRAVENOUS ONCE
Status: COMPLETED | OUTPATIENT
Start: 2019-01-16 | End: 2019-01-16

## 2019-01-16 RX ORDER — BENZONATATE 200 MG/1
200 CAPSULE ORAL 3 TIMES DAILY PRN
Qty: 30 CAPSULE | Refills: 0 | Status: SHIPPED | OUTPATIENT
Start: 2019-01-16 | End: 2019-02-12

## 2019-01-16 RX ADMIN — DEXAMETHASONE SODIUM PHOSPHATE 5 MG: 100 INJECTION INTRAMUSCULAR; INTRAVENOUS at 06:01

## 2019-01-16 NOTE — PROGRESS NOTES
"Subjective:       Patient ID: Samantha Sr is a 33 y.o. female.    Vitals:  height is 5' 2" (1.575 m) and weight is 83.9 kg (185 lb). Her temperature is 99.4 °F (37.4 °C). Her blood pressure is 165/90 (abnormal) and her pulse is 105. Her respiration is 18 and oxygen saturation is 98%.     Chief Complaint: URI    URI    This is a new problem. Episode onset: 2 weeks. The problem has been unchanged. There has been no fever. Associated symptoms include congestion, coughing, headaches, rhinorrhea and sinus pain. Pertinent negatives include no ear pain, nausea, rash, sore throat, vomiting or wheezing. She has tried decongestant and antihistamine (Robitussin) for the symptoms. The treatment provided mild relief.       Constitution: Negative for chills, sweating, fatigue and fever.   HENT: Positive for congestion, nosebleeds, postnasal drip, sinus pain and sinus pressure. Negative for ear pain, sore throat and voice change.    Neck: Negative for painful lymph nodes.   Eyes: Negative for eye redness.   Respiratory: Positive for cough. Negative for chest tightness, sputum production, bloody sputum, COPD, shortness of breath, stridor, wheezing and asthma.    Gastrointestinal: Negative for nausea and vomiting.   Musculoskeletal: Negative for muscle ache.   Skin: Negative for rash.   Allergic/Immunologic: Negative for seasonal allergies and asthma.   Neurological: Positive for headaches.   Hematologic/Lymphatic: Negative for swollen lymph nodes.       Objective:      Physical Exam   Constitutional: She is oriented to person, place, and time. She appears well-developed and well-nourished. She is cooperative.  Non-toxic appearance. She does not appear ill. No distress.   HENT:   Head: Normocephalic and atraumatic.   Right Ear: Hearing, external ear and ear canal normal. A middle ear effusion is present.   Left Ear: Hearing, external ear and ear canal normal. A middle ear effusion is present.   Nose: Mucosal edema and rhinorrhea " present. No nasal deformity. No epistaxis. Right sinus exhibits frontal sinus tenderness. Right sinus exhibits no maxillary sinus tenderness. Left sinus exhibits frontal sinus tenderness. Left sinus exhibits no maxillary sinus tenderness.   Mouth/Throat: Uvula is midline, oropharynx is clear and moist and mucous membranes are normal. No trismus in the jaw. Normal dentition. No uvula swelling. No oropharyngeal exudate, posterior oropharyngeal edema or posterior oropharyngeal erythema.   Eyes: Conjunctivae and lids are normal. No scleral icterus.   Sclera clear bilat   Neck: Trachea normal, full passive range of motion without pain and phonation normal. Neck supple.   Cardiovascular: Normal rate, regular rhythm, normal heart sounds, intact distal pulses and normal pulses.   Pulmonary/Chest: Effort normal and breath sounds normal. No stridor. No respiratory distress. She has no decreased breath sounds. She has no wheezes.   Abdominal: Soft. Normal appearance and bowel sounds are normal. She exhibits no distension. There is no tenderness.   Musculoskeletal: Normal range of motion. She exhibits no edema or deformity.   Neurological: She is alert and oriented to person, place, and time. She exhibits normal muscle tone. Coordination normal.   Skin: Skin is warm, dry and intact. She is not diaphoretic. No pallor.   Psychiatric: She has a normal mood and affect. Her speech is normal and behavior is normal. Judgment and thought content normal. Cognition and memory are normal.   Nursing note and vitals reviewed.      Assessment:       1. Acute non-recurrent pansinusitis        Plan:         Acute non-recurrent pansinusitis  -     amoxicillin-clavulanate 875-125mg (AUGMENTIN) 875-125 mg per tablet; Take 1 tablet by mouth 2 (two) times daily. for 10 days  Dispense: 20 tablet; Refill: 0  -     codeine-guaifenesin 8-200 mg/5 mL Liqd; Take 5 mLs by mouth every 4 (four) hours.  Dispense: 120 mL; Refill: 0  -     benzonatate  (TESSALON) 200 MG capsule; Take 1 capsule (200 mg total) by mouth 3 (three) times daily as needed.  Dispense: 30 capsule; Refill: 0  -     dexamethasone injection 5 mg      Patient Instructions       TAKE THE FULL 10 DAYS OF AUGMENTIN    TESSALON PERLES- NON DROWSY    COUGH SYRUP- DROWSY    SALINE NASAL RINSE TWICE A DAY - OTC    Sinusitis (Antibiotic Treatment)    The sinuses are air-filled spaces within the bones of the face. They connect to the inside of the nose. Sinusitis is an inflammation of the tissue lining the sinus cavity. Sinus inflammation can occur during a cold. It can also be due to allergies to pollens and other particles in the air. Sinusitis can cause symptoms of sinus congestion and fullness. A sinus infection causes fever, headache and facial pain. There is often green or yellow drainage from the nose or into the back of the throat (post-nasal drip). You have been given antibiotics to treat this condition.  Home care:  · Take the full course of antibiotics as instructed. Do not stop taking them, even if you feel better.  · Drink plenty of water, hot tea, and other liquids. This may help thin mucus. It also may promote sinus drainage.  · Heat may help soothe painful areas of the face. Use a towel soaked in hot water. Or,  the shower and direct the hot spray onto your face. Using a vaporizer along with a menthol rub at night may also help.   · An expectorant containing guaifenesin may help thin the mucus and promote drainage from the sinuses.  · Over-the-counter decongestants may be used unless a similar medicine was prescribed. Nasal sprays work the fastest. Use one that contains phenylephrine or oxymetazoline. First blow the nose gently. Then use the spray. Do not use these medicines more often than directed on the label or symptoms may get worse. You may also use tablets containing pseudoephedrine. Avoid products that combine ingredients, because side effects may be increased. Read  labels. You can also ask the pharmacist for help. (NOTE: Persons with high blood pressure should not use decongestants. They can raise blood pressure.)  · Over-the-counter antihistamines may help if allergies contributed to your sinusitis.    · Do not use nasal rinses or irrigation during an acute sinus infection, unless told to by your health care provider. Rinsing may spread the infection to other sinuses.  · Use acetaminophen or ibuprofen to control pain, unless another pain medicine was prescribed. (If you have chronic liver or kidney disease or ever had a stomach ulcer, talk with your doctor before using these medicines. Aspirin should never be used in anyone under 18 years of age who is ill with a fever. It may cause severe liver damage.)  · Don't smoke. This can worsen symptoms.  Follow-up care  Follow up with your healthcare provider or our staff if you are not improving within the next week.  When to seek medical advice  Call your healthcare provider if any of these occur:  · Facial pain or headache becoming more severe  · Stiff neck  · Unusual drowsiness or confusion  · Swelling of the forehead or eyelids  · Vision problems, including blurred or double vision  · Fever of 100.4ºF (38ºC) or higher, or as directed by your healthcare provider  · Seizure  · Breathing problems  · Symptoms not resolving within 10 days  Date Last Reviewed: 4/13/2015  © 6351-5048 [employer]. [address] [place] [address], [place] [address]. All rights reserved. This information is not intended as a substitute for professional medical care. Always follow your healthcare professional's instructions.

## 2019-01-17 NOTE — PATIENT INSTRUCTIONS
TAKE THE FULL 10 DAYS OF AUGMENTIN    TESSALON PERLES- NON DROWSY    COUGH SYRUP- DROWSY    SALINE NASAL RINSE TWICE A DAY - OTC    Sinusitis (Antibiotic Treatment)    The sinuses are air-filled spaces within the bones of the face. They connect to the inside of the nose. Sinusitis is an inflammation of the tissue lining the sinus cavity. Sinus inflammation can occur during a cold. It can also be due to allergies to pollens and other particles in the air. Sinusitis can cause symptoms of sinus congestion and fullness. A sinus infection causes fever, headache and facial pain. There is often green or yellow drainage from the nose or into the back of the throat (post-nasal drip). You have been given antibiotics to treat this condition.  Home care:  · Take the full course of antibiotics as instructed. Do not stop taking them, even if you feel better.  · Drink plenty of water, hot tea, and other liquids. This may help thin mucus. It also may promote sinus drainage.  · Heat may help soothe painful areas of the face. Use a towel soaked in hot water. Or,  the shower and direct the hot spray onto your face. Using a vaporizer along with a menthol rub at night may also help.   · An expectorant containing guaifenesin may help thin the mucus and promote drainage from the sinuses.  · Over-the-counter decongestants may be used unless a similar medicine was prescribed. Nasal sprays work the fastest. Use one that contains phenylephrine or oxymetazoline. First blow the nose gently. Then use the spray. Do not use these medicines more often than directed on the label or symptoms may get worse. You may also use tablets containing pseudoephedrine. Avoid products that combine ingredients, because side effects may be increased. Read labels. You can also ask the pharmacist for help. (NOTE: Persons with high blood pressure should not use decongestants. They can raise blood pressure.)  · Over-the-counter antihistamines may help if  allergies contributed to your sinusitis.    · Do not use nasal rinses or irrigation during an acute sinus infection, unless told to by your health care provider. Rinsing may spread the infection to other sinuses.  · Use acetaminophen or ibuprofen to control pain, unless another pain medicine was prescribed. (If you have chronic liver or kidney disease or ever had a stomach ulcer, talk with your doctor before using these medicines. Aspirin should never be used in anyone under 18 years of age who is ill with a fever. It may cause severe liver damage.)  · Don't smoke. This can worsen symptoms.  Follow-up care  Follow up with your healthcare provider or our staff if you are not improving within the next week.  When to seek medical advice  Call your healthcare provider if any of these occur:  · Facial pain or headache becoming more severe  · Stiff neck  · Unusual drowsiness or confusion  · Swelling of the forehead or eyelids  · Vision problems, including blurred or double vision  · Fever of 100.4ºF (38ºC) or higher, or as directed by your healthcare provider  · Seizure  · Breathing problems  · Symptoms not resolving within 10 days  Date Last Reviewed: 4/13/2015  © 7064-4599 [employer]. [address] [place] [address], [place] [address]. All rights reserved. This information is not intended as a substitute for professional medical care. Always follow your healthcare professional's instructions.

## 2019-02-12 ENCOUNTER — HOSPITAL ENCOUNTER (EMERGENCY)
Facility: HOSPITAL | Age: 34
Discharge: HOME | End: 2019-02-12
Attending: EMERGENCY MEDICINE
Payer: MEDICAID

## 2019-02-12 VITALS
HEART RATE: 77 BPM | RESPIRATION RATE: 18 BRPM | TEMPERATURE: 98 F | BODY MASS INDEX: 33.13 KG/M2 | HEIGHT: 62 IN | SYSTOLIC BLOOD PRESSURE: 141 MMHG | WEIGHT: 180 LBS | DIASTOLIC BLOOD PRESSURE: 97 MMHG | OXYGEN SATURATION: 98 %

## 2019-02-12 DIAGNOSIS — R11.2 NON-INTRACTABLE VOMITING WITH NAUSEA, UNSPECIFIED VOMITING TYPE: ICD-10-CM

## 2019-02-12 DIAGNOSIS — R10.9 RIGHT FLANK PAIN: Primary | ICD-10-CM

## 2019-02-12 LAB
ALBUMIN SERPL BCP-MCNC: 4 G/DL
ALP SERPL-CCNC: 80 U/L
ALT SERPL W/O P-5'-P-CCNC: 34 U/L
ANION GAP SERPL CALC-SCNC: 10 MMOL/L
AST SERPL-CCNC: 32 U/L
B-HCG UR QL: NEGATIVE
BACTERIA #/AREA URNS HPF: ABNORMAL /HPF
BASOPHILS # BLD AUTO: 0.03 K/UL
BASOPHILS NFR BLD: 0.3 %
BILIRUB SERPL-MCNC: 0.3 MG/DL
BILIRUB UR QL STRIP: NEGATIVE
BUN SERPL-MCNC: 10 MG/DL
CALCIUM SERPL-MCNC: 9.3 MG/DL
CHLORIDE SERPL-SCNC: 103 MMOL/L
CLARITY UR: ABNORMAL
CO2 SERPL-SCNC: 21 MMOL/L
COLOR UR: YELLOW
CREAT SERPL-MCNC: 0.7 MG/DL
CTP QC/QA: YES
DIFFERENTIAL METHOD: NORMAL
EOSINOPHIL # BLD AUTO: 0.2 K/UL
EOSINOPHIL NFR BLD: 2 %
ERYTHROCYTE [DISTWIDTH] IN BLOOD BY AUTOMATED COUNT: 12.5 %
EST. GFR  (AFRICAN AMERICAN): >60 ML/MIN/1.73 M^2
EST. GFR  (NON AFRICAN AMERICAN): >60 ML/MIN/1.73 M^2
GLUCOSE SERPL-MCNC: 138 MG/DL
GLUCOSE UR QL STRIP: NEGATIVE
HCT VFR BLD AUTO: 44.2 %
HGB BLD-MCNC: 14.9 G/DL
HGB UR QL STRIP: ABNORMAL
KETONES UR QL STRIP: NEGATIVE
LEUKOCYTE ESTERASE UR QL STRIP: NEGATIVE
LYMPHOCYTES # BLD AUTO: 4.6 K/UL
LYMPHOCYTES NFR BLD: 39.8 %
MCH RBC QN AUTO: 30.7 PG
MCHC RBC AUTO-ENTMCNC: 33.7 G/DL
MCV RBC AUTO: 91 FL
MICROSCOPIC COMMENT: ABNORMAL
MONOCYTES # BLD AUTO: 0.7 K/UL
MONOCYTES NFR BLD: 5.9 %
NEUTROPHILS # BLD AUTO: 6 K/UL
NEUTROPHILS NFR BLD: 52 %
NITRITE UR QL STRIP: NEGATIVE
PH UR STRIP: 5 [PH] (ref 5–8)
PLATELET # BLD AUTO: 320 K/UL
PMV BLD AUTO: 9.4 FL
POTASSIUM SERPL-SCNC: 3.8 MMOL/L
PROT SERPL-MCNC: 7.8 G/DL
PROT UR QL STRIP: NEGATIVE
RBC # BLD AUTO: 4.86 M/UL
RBC #/AREA URNS HPF: 1 /HPF (ref 0–4)
SODIUM SERPL-SCNC: 134 MMOL/L
SP GR UR STRIP: 1.01 (ref 1–1.03)
SQUAMOUS #/AREA URNS HPF: ABNORMAL /HPF
URN SPEC COLLECT METH UR: ABNORMAL
UROBILINOGEN UR STRIP-ACNC: NEGATIVE EU/DL
WBC # BLD AUTO: 11.53 K/UL
WBC #/AREA URNS HPF: 2 /HPF (ref 0–5)

## 2019-02-12 PROCEDURE — 63600175 PHARM REV CODE 636 W HCPCS: Performed by: PHYSICIAN ASSISTANT

## 2019-02-12 PROCEDURE — 80053 COMPREHEN METABOLIC PANEL: CPT

## 2019-02-12 PROCEDURE — 99284 EMERGENCY DEPT VISIT MOD MDM: CPT | Mod: 25

## 2019-02-12 PROCEDURE — 85025 COMPLETE CBC W/AUTO DIFF WBC: CPT

## 2019-02-12 PROCEDURE — 81000 URINALYSIS NONAUTO W/SCOPE: CPT

## 2019-02-12 PROCEDURE — 96375 TX/PRO/DX INJ NEW DRUG ADDON: CPT

## 2019-02-12 PROCEDURE — 81025 URINE PREGNANCY TEST: CPT | Performed by: NURSE PRACTITIONER

## 2019-02-12 PROCEDURE — 96374 THER/PROPH/DIAG INJ IV PUSH: CPT

## 2019-02-12 RX ORDER — ONDANSETRON 2 MG/ML
4 INJECTION INTRAMUSCULAR; INTRAVENOUS
Status: COMPLETED | OUTPATIENT
Start: 2019-02-12 | End: 2019-02-12

## 2019-02-12 RX ORDER — KETOROLAC TROMETHAMINE 30 MG/ML
15 INJECTION, SOLUTION INTRAMUSCULAR; INTRAVENOUS
Status: COMPLETED | OUTPATIENT
Start: 2019-02-12 | End: 2019-02-12

## 2019-02-12 RX ORDER — ONDANSETRON 4 MG/1
4 TABLET, ORALLY DISINTEGRATING ORAL EVERY 6 HOURS PRN
Qty: 10 TABLET | Refills: 0 | Status: SHIPPED | OUTPATIENT
Start: 2019-02-12 | End: 2019-06-23

## 2019-02-12 RX ORDER — KETOROLAC TROMETHAMINE 10 MG/1
10 TABLET, FILM COATED ORAL EVERY 6 HOURS PRN
Qty: 15 TABLET | Refills: 0 | Status: SHIPPED | OUTPATIENT
Start: 2019-02-12 | End: 2019-07-03

## 2019-02-12 RX ADMIN — ONDANSETRON 4 MG: 2 INJECTION INTRAMUSCULAR; INTRAVENOUS at 04:02

## 2019-02-12 RX ADMIN — KETOROLAC TROMETHAMINE 15 MG: 30 INJECTION, SOLUTION INTRAMUSCULAR at 04:02

## 2019-02-12 NOTE — DISCHARGE INSTRUCTIONS
Please make sure you are drinking lots of fluids (water/Gatorade/Powerade) and getting plenty of rest.    Eat a bland diet of bananas, rice, applesauce, toast, crackers--advance as you tolerate these foods.     You were given Zofran for nausea.    You have been prescribed Toradol for pain.    Please follow-up with your primary care provider if your symptoms continue.    Return to the ER for any new or worsening symptoms.

## 2019-02-12 NOTE — ED PROVIDER NOTES
"Encounter Date: 2/12/2019  SORT MSE:  Pt is a 34 y.o. female who presents for emergent consideration for abd pain. Pt will be moved to room when one is available, otherwise will wait in waiting room with triage nurse supervision.  Pt arrived by ambulatory. She is not in distress. Orders have been placed. KATIE Che DNP ACN-BC 2/12/2019 3:53 PM     SCRIBE #1 NOTE: I, Joaquín Orantes, am scribing for, and in the presence of,  Samantha Dove PA-C. I have scribed the following portions of the note - Other sections scribed: HPI, ROS .       History     Chief Complaint   Patient presents with    Flank Pain     Reports having right sided flank pain that started around 0300 today.  Reports vomitting      CC: Abdominal Pain    HPI: The patient is a 35 y/o female with pmhx of cholesectomy, anxiety, bladder infection, carpal tunnel syndrome, depression, HTN, migraine HA, pancreatitis, and UTI that presents for emergent evaluation of acute, severe (9/10) R sided abdominal pain with nausea and vomiting that began around 0300 this morning. She describes pain as a constant cramping, that radiates to her R flank and back. She reports her hx of kidney stones, but states that her pain today feels different. She notes x8-9 episodes of emesis since onset of her pain. Pt is also c/o "pressure" with urination. Her LMP was 2/3. She otherwise denies fever, chills, congestion, chest pain, SOB, diarrhea, constipation, dysuria, hematuria, discharge, weakness, numbness, or HA.           Review of patient's allergies indicates:   Allergen Reactions    Compazine [prochlorperazine edisylate] Anxiety    Fioricet [butalbital-acetaminophen-caff] Other (See Comments)     Reaction:  "Really bad yeast infections"    Reglan [metoclopramide] Rash     Past Medical History:   Diagnosis Date    Anxiety     Bladder infection     Carpal tunnel syndrome     Depression     Hypertension     Migraine headache     Pancreatitis     UTI (lower urinary " tract infection)      Past Surgical History:   Procedure Laterality Date    CHOLECYSTECTOMY      CHOLECYSTECTOMY, LAPAROSCOPIC N/A 2012    Performed [facility]    JOINT REPLACEMENT      KNEE SURGERY   aug 2008    right knee    KNEE SURGERY      REDUCTION MAMMAPLASTY      Breast reduction     Family History   Problem Relation Age of Onset    Hypertension Mother     Hypertension Father     Hypertension Maternal Grandmother     Breast cancer Neg Hx     Colon cancer Neg Hx     Ovarian cancer Neg Hx      Social History     Tobacco Use    Smoking status: Former Smoker     Packs/day: 0.50     Years: 8.00     Pack years: 4.00     Types: Cigarettes     Last attempt to quit: 2018     Years since quittin.2    Smokeless tobacco: Never Used    Tobacco comment: Pt states she smokes 1 pack of cigarettes in 3 days.   Substance Use Topics    Alcohol use: No    Drug use: No     Review of Systems   Constitutional: Negative for appetite change and fever.   HENT: Negative for congestion, ear discharge, ear pain, rhinorrhea and sore throat.    Eyes: Negative for visual disturbance.   Respiratory: Negative for cough and shortness of breath.    Cardiovascular: Negative for chest pain.   Gastrointestinal: Positive for abdominal pain, nausea and vomiting. Negative for constipation and diarrhea.   Genitourinary: Positive for flank pain. Negative for decreased urine volume, dysuria, vaginal bleeding, vaginal discharge and vaginal pain.   Musculoskeletal: Negative for gait problem and neck pain.   Skin: Negative for rash.   Neurological: Negative for dizziness, syncope, weakness, light-headedness, numbness and headaches.   Psychiatric/Behavioral: Negative for confusion.   All other systems reviewed and are negative.      Physical Exam     Initial Vitals [19 1555]   BP Pulse Resp Temp SpO2   (!) 186/109 86 18 98.3 °F (36.8 °C) 98 %      MAP       --         Physical Exam    Nursing note  and vitals reviewed.  Constitutional: Vital signs are normal. She appears well-developed and well-nourished. She is not diaphoretic. She is cooperative.  Non-toxic appearance. She does not have a sickly appearance. She does not appear ill. No distress.   HENT:   Head: Normocephalic and atraumatic.   Right Ear: External ear normal.   Left Ear: External ear normal.   Nose: Nose normal.   Mouth/Throat: Uvula is midline, oropharynx is clear and moist and mucous membranes are normal. No oropharyngeal exudate.   Eyes: Conjunctivae, EOM and lids are normal. Pupils are equal, round, and reactive to light.   Neck: Trachea normal, normal range of motion, full passive range of motion without pain and phonation normal. Neck supple.   Cardiovascular: Normal rate, regular rhythm, normal heart sounds and intact distal pulses. Exam reveals no gallop and no friction rub.    No murmur heard.  Pulmonary/Chest: Effort normal and breath sounds normal. No respiratory distress. She has no decreased breath sounds. She has no wheezes. She has no rhonchi. She has no rales.   Abdominal: Soft. Normal appearance and bowel sounds are normal. She exhibits no distension and no mass. There is tenderness in the right upper quadrant. There is no rigidity, no rebound, no guarding and no CVA tenderness.   Musculoskeletal: Normal range of motion.        Cervical back: Normal.        Thoracic back: Normal.        Lumbar back: Normal.   Neurological: She is alert and oriented to person, place, and time. She has normal strength. No cranial nerve deficit or sensory deficit. GCS eye subscore is 4. GCS verbal subscore is 5. GCS motor subscore is 6.   Skin: Skin is warm and dry. Capillary refill takes less than 2 seconds. No rash noted.   Psychiatric: She has a normal mood and affect. Her speech is normal and behavior is normal. Judgment and thought content normal. Cognition and memory are normal.         ED Course   Procedures  Labs Reviewed   COMPREHENSIVE  METABOLIC PANEL - Abnormal; Notable for the following components:       Result Value    Sodium 134 (*)     CO2 21 (*)     Glucose 138 (*)     All other components within normal limits   URINALYSIS, REFLEX TO URINE CULTURE - Abnormal; Notable for the following components:    Appearance, UA Cloudy (*)     Occult Blood UA 1+ (*)     All other components within normal limits   URINALYSIS MICROSCOPIC - Abnormal; Notable for the following components:    Bacteria, UA Few (*)     All other components within normal limits   CBC W/ AUTO DIFFERENTIAL   POCT URINE PREGNANCY          Imaging Results          CT Abdomen Pelvis  Without Contrast (Final result)  Result time 02/12/19 17:22:47    Final result by Jayla Samson MD (02/12/19 17:22:47)                 Impression:      1. No acute intra-abdominal abnormalities identified.  2. Cholecystectomy.      Electronically signed by: Jayla Samson MD  Date:    02/12/2019  Time:    17:22             Narrative:    EXAMINATION:  CT ABDOMEN PELVIS WITHOUT CONTRAST    CLINICAL HISTORY:  Abdominal pain, unspecified;RUQ pain that radiates to right flank, h/o cholecystectomy, h/o pancreatitis and nephrolithiasis;    TECHNIQUE:  Low dose axial images, sagittal and coronal reformations were obtained from the lung bases to the pubic symphysis.  Oral contrast was not administered.    COMPARISON:  CT abdomen and pelvis from September 2015.    FINDINGS:  The visualized portion of the heart is unremarkable.  The lung bases are clear.    No significant hepatic abnormality seen on this noncontrast exam.  There is no intra-or extrahepatic biliary ductal dilatation.  Gallbladder has been removed.  The stomach, pancreas, spleen, and adrenal glands are unremarkable.    Kidneys show no evidence of stones or hydronephrosis. Ureters are unremarkable along their courses.  Urinary bladder and uterus show no significant abnormalities.  No significant adnexal abnormalities are seen.    Appendix is  "visualized and is unremarkable.  The visualized loops of small and large bowel show no evidence of obstruction or inflammation.  Moderate stool is visualized within the colon.  No free air or free fluid.    Aorta tapers normally.    No acute osseous abnormality identified. Subcutaneous soft tissue structures are unremarkable.                                       APC / Resident Notes:   This is an evaluation of a 34 y.o. female that presents to the Emergency Department for flank pain. Patient reports right flank pain that began at 3:00 p.m. today.  She reports associated nausea and 8-9 episodes of vomiting. She denies attempted tx PTA. She reports "I think it might be a pulled muscle from throwing up."    Exam findings: Patient is non-toxic, afebrile and well appearing. There is TTP of the right upper quadrant without peritoneal signs. Bowel sounds appreciated. No CVA tenderness. No tenderness to palpation of the spine.  Lungs clear to auscultation bilaterally, no wheezing, rales or rhonchi.  No respiratory distress. Full range of motion of the spine.  Patient ambulatory moving all extremities.    If available, past records have been reviewed.  Vitals are reassuring.  Results:   Urinalysis unrevealing for infection  No leukocytosis  CMP unrevealing for transaminitis, acute kidney injury, electrolyte abnormality.  CT abdomen pelvis unrevealing for acute intra-abdominal abnormalities.  Status post cholecystectomy.      My overall impression: right flank pain, nausea, vomiting; I suspect viral etiology  DDx: flank pain, nausea, vomiting, UTI, nephrolithiasis, viral gastritis, other    ED course: IV zofran and toradol given for symptomatic care in the ED.  Upon re-evaluation, patient reports some relief of pain. No further emesis.  I will prescribe Zofran and Toradol.  I will recommend bland diet and increase fluid intake.  I will recommend follow-up with primary care.  I feel this patient is stable for discharge.  ED " return precautions given.    The diagnosis and treatment plan have been discussed with the patient. All questions and concerns have been addressed. Patient expressed understanding. An educational information sheet was given to the patient prior to discharge.     This case has been discussed with Dr. De La Cruz and he is in agreement of the diagnosis and treatment plan.       Samantha Dove PA-C         Scribe Attestation:   Scribe #1: I performed the above scribed service and the documentation accurately describes the services I performed. I attest to the accuracy of the note.    Attending Attestation:   Physician Attestation Statement for Resident:  As the supervising MD  I agree with the above history. -:   As the supervising MD I agree with the above PE.    As the supervising MD I agree with the above treatment, course, plan, and disposition.   -: I have staffed the patient with the midlevel provider and was available for consultation in the department. I have guided the treatment plan and agree with the care provided.            Physician Attestation for Scribe:  Physician Attestation Statement for Scribe #1: I, Samantha Dove PA-C, reviewed documentation, as scribed by Joaquín Orantes in my presence, and it is both accurate and complete.                    Clinical Impression:   The primary encounter diagnosis was Right flank pain. A diagnosis of Non-intractable vomiting with nausea, unspecified vomiting type was also pertinent to this visit.      Disposition:   Disposition: Discharged  Condition: Stable                        Samantha Dove PA-C  02/12/19 2039       Hunter De La Cruz MD  02/12/19 2220

## 2019-02-12 NOTE — ED TRIAGE NOTES
The pt states her right sided abdominal pain started at 0300 this morning. Reports nausea and vomiting X 9. Denies fever and diarrhea.

## 2019-02-23 ENCOUNTER — HOSPITAL ENCOUNTER (EMERGENCY)
Facility: HOSPITAL | Age: 34
Discharge: HOME | End: 2019-02-23
Attending: EMERGENCY MEDICINE
Payer: MEDICAID

## 2019-02-23 VITALS
HEART RATE: 89 BPM | WEIGHT: 195 LBS | TEMPERATURE: 98 F | DIASTOLIC BLOOD PRESSURE: 102 MMHG | OXYGEN SATURATION: 100 % | SYSTOLIC BLOOD PRESSURE: 168 MMHG | HEIGHT: 62 IN | RESPIRATION RATE: 18 BRPM | BODY MASS INDEX: 35.88 KG/M2

## 2019-02-23 DIAGNOSIS — I10 ESSENTIAL HYPERTENSION: ICD-10-CM

## 2019-02-23 DIAGNOSIS — K59.00 CONSTIPATION: Primary | ICD-10-CM

## 2019-02-23 DIAGNOSIS — R10.10 PAIN OF UPPER ABDOMEN: ICD-10-CM

## 2019-02-23 LAB
ALBUMIN SERPL BCP-MCNC: 3.9 G/DL
ALP SERPL-CCNC: 71 U/L
ALT SERPL W/O P-5'-P-CCNC: 22 U/L
AMORPH CRY URNS QL MICRO: NORMAL
ANION GAP SERPL CALC-SCNC: 11 MMOL/L
AST SERPL-CCNC: 21 U/L
B-HCG UR QL: NEGATIVE
BACTERIA #/AREA URNS HPF: NORMAL /HPF
BASOPHILS # BLD AUTO: 0.03 K/UL
BASOPHILS NFR BLD: 0.3 %
BILIRUB SERPL-MCNC: 0.3 MG/DL
BILIRUB UR QL STRIP: NEGATIVE
BUN SERPL-MCNC: 12 MG/DL
CALCIUM SERPL-MCNC: 9 MG/DL
CHLORIDE SERPL-SCNC: 105 MMOL/L
CLARITY UR: ABNORMAL
CO2 SERPL-SCNC: 20 MMOL/L
COLOR UR: YELLOW
CREAT SERPL-MCNC: 0.8 MG/DL
CTP QC/QA: YES
DIFFERENTIAL METHOD: ABNORMAL
EOSINOPHIL # BLD AUTO: 0.3 K/UL
EOSINOPHIL NFR BLD: 2.8 %
ERYTHROCYTE [DISTWIDTH] IN BLOOD BY AUTOMATED COUNT: 13.3 %
EST. GFR  (AFRICAN AMERICAN): >60 ML/MIN/1.73 M^2
EST. GFR  (NON AFRICAN AMERICAN): >60 ML/MIN/1.73 M^2
GLUCOSE SERPL-MCNC: 130 MG/DL
GLUCOSE UR QL STRIP: NEGATIVE
HCG INTACT+B SERPL-ACNC: <1.2 MIU/ML
HCT VFR BLD AUTO: 39.9 %
HGB BLD-MCNC: 13.4 G/DL
HGB UR QL STRIP: ABNORMAL
KETONES UR QL STRIP: NEGATIVE
LEUKOCYTE ESTERASE UR QL STRIP: NEGATIVE
LIPASE SERPL-CCNC: 12 U/L
LYMPHOCYTES # BLD AUTO: 4.7 K/UL
LYMPHOCYTES NFR BLD: 41.7 %
MCH RBC QN AUTO: 30.7 PG
MCHC RBC AUTO-ENTMCNC: 33.6 G/DL
MCV RBC AUTO: 91 FL
MICROSCOPIC COMMENT: NORMAL
MONOCYTES # BLD AUTO: 0.6 K/UL
MONOCYTES NFR BLD: 5.2 %
NEUTROPHILS # BLD AUTO: 5.6 K/UL
NEUTROPHILS NFR BLD: 50 %
NITRITE UR QL STRIP: NEGATIVE
PH UR STRIP: 6 [PH] (ref 5–8)
PLATELET # BLD AUTO: 260 K/UL
PMV BLD AUTO: 8.7 FL
POTASSIUM SERPL-SCNC: 3.8 MMOL/L
PROT SERPL-MCNC: 7.2 G/DL
PROT UR QL STRIP: NEGATIVE
RBC # BLD AUTO: 4.37 M/UL
RBC #/AREA URNS HPF: 0 /HPF (ref 0–4)
SODIUM SERPL-SCNC: 136 MMOL/L
SP GR UR STRIP: 1.02 (ref 1–1.03)
SQUAMOUS #/AREA URNS HPF: 3 /HPF
URN SPEC COLLECT METH UR: ABNORMAL
UROBILINOGEN UR STRIP-ACNC: NEGATIVE EU/DL
WBC # BLD AUTO: 11.15 K/UL

## 2019-02-23 PROCEDURE — 84702 CHORIONIC GONADOTROPIN TEST: CPT

## 2019-02-23 PROCEDURE — 25000003 PHARM REV CODE 250: Performed by: EMERGENCY MEDICINE

## 2019-02-23 PROCEDURE — 83690 ASSAY OF LIPASE: CPT

## 2019-02-23 PROCEDURE — 63600175 PHARM REV CODE 636 W HCPCS: Performed by: EMERGENCY MEDICINE

## 2019-02-23 PROCEDURE — 81000 URINALYSIS NONAUTO W/SCOPE: CPT

## 2019-02-23 PROCEDURE — 85025 COMPLETE CBC W/AUTO DIFF WBC: CPT

## 2019-02-23 PROCEDURE — 81025 URINE PREGNANCY TEST: CPT | Performed by: NURSE PRACTITIONER

## 2019-02-23 PROCEDURE — 96374 THER/PROPH/DIAG INJ IV PUSH: CPT

## 2019-02-23 PROCEDURE — 99284 EMERGENCY DEPT VISIT MOD MDM: CPT | Mod: 25

## 2019-02-23 PROCEDURE — 80053 COMPREHEN METABOLIC PANEL: CPT

## 2019-02-23 RX ORDER — ADHESIVE BANDAGE
30 BANDAGE TOPICAL
Status: COMPLETED | OUTPATIENT
Start: 2019-02-23 | End: 2019-02-23

## 2019-02-23 RX ORDER — DOCUSATE SODIUM 100 MG/1
100 CAPSULE, LIQUID FILLED ORAL 2 TIMES DAILY
Qty: 60 CAPSULE | Refills: 0 | Status: SHIPPED | OUTPATIENT
Start: 2019-02-23 | End: 2019-07-03

## 2019-02-23 RX ORDER — HYDRALAZINE HYDROCHLORIDE 20 MG/ML
10 INJECTION INTRAMUSCULAR; INTRAVENOUS
Status: COMPLETED | OUTPATIENT
Start: 2019-02-23 | End: 2019-02-23

## 2019-02-23 RX ORDER — LACTULOSE 10 G/15ML
30 SOLUTION ORAL
Status: COMPLETED | OUTPATIENT
Start: 2019-02-23 | End: 2019-02-23

## 2019-02-23 RX ADMIN — LACTULOSE 30 G: 20 SOLUTION ORAL at 06:02

## 2019-02-23 RX ADMIN — MAGNESIUM HYDROXIDE 2400 MG: 400 SUSPENSION ORAL at 06:02

## 2019-02-23 RX ADMIN — HYDRALAZINE HYDROCHLORIDE 10 MG: 20 INJECTION INTRAMUSCULAR; INTRAVENOUS at 07:02

## 2019-02-24 NOTE — ED TRIAGE NOTES
34 y.o. Female presents to the ED with chief complaint of constipation. Patient reports constipation x3 days with associated abdominal pain. Patient states she has been taking OTC medication with no relief. Patient denies recent trauma. Patient resting in bed in NAD. Side rails up x2.

## 2019-02-24 NOTE — DISCHARGE INSTRUCTIONS
It is important to drink plenty of fluids and increase the fiber in her diet do not rely on laxatives as these can cause you to have chronic constipation of abused.

## 2019-02-24 NOTE — ED PROVIDER NOTES
"Encounter Date: 2/23/2019    SCRIBE #1 NOTE: I, Herberth Faustin , am scribing for, and in the presence of,  Wesley Capellan MD. I have scribed the following portions of the note - Other sections scribed: HPI, ROS, PE.       History     Chief Complaint   Patient presents with    Constipation     abd pain, cramping, last bm 2 weeks ago; took laxatives, enemas, "everything" n/v     CC: Constipation    This is a 34 y.o Female with pmhx of constipation, bladder infection, HTN, and pancreatitis presenting to the ED with c/o of constipation x2 wk. She states "I have not had a bowel movement in 2 wks". She adds that she has attempted "everything over the counter," including laxatives and an enema. Pt adds that in this time she has had a decreased appetite and is "vomitting up what I do eat". Pt reports that she still has the urge to use the bathroom. Pt denies taking pain medication. Pt denies fever and chills. No other symptoms to report.         The history is provided by the patient. No  was used.     Review of patient's allergies indicates:   Allergen Reactions    Compazine [prochlorperazine edisylate] Anxiety    Fioricet [butalbital-acetaminophen-caff] Other (See Comments)     Reaction:  "Really bad yeast infections"    Reglan [metoclopramide] Rash     Past Medical History:   Diagnosis Date    Anxiety     Bladder infection     Carpal tunnel syndrome     Depression     Hypertension     Migraine headache     Pancreatitis     UTI (lower urinary tract infection)      Past Surgical History:   Procedure Laterality Date    CHOLECYSTECTOMY      CHOLECYSTECTOMY, LAPAROSCOPIC N/A 12/21/2012    [facility]    JOINT REPLACEMENT      KNEE SURGERY   aug 2008    right knee    KNEE SURGERY      REDUCTION MAMMAPLASTY      Breast reduction     Family History   Problem Relation Age of Onset    Hypertension Mother     Hypertension Father     Hypertension Maternal " Grandmother     Breast cancer Neg Hx     Colon cancer Neg Hx     Ovarian cancer Neg Hx      Social History     Tobacco Use    Smoking status: Former Smoker     Packs/day: 0.50     Years: 8.00     Pack years: 4.00     Types: Cigarettes     Last attempt to quit: 2018     Years since quittin.2    Smokeless tobacco: Never Used    Tobacco comment: Pt states she smokes 1 pack of cigarettes in 3 days.   Substance Use Topics    Alcohol use: No    Drug use: No     Review of Systems   Constitutional: Negative for chills and fever.   HENT: Negative for congestion.    Eyes: Negative for redness.   Respiratory: Negative for cough and shortness of breath.    Cardiovascular: Negative for chest pain.   Gastrointestinal: Positive for constipation and vomiting. Negative for blood in stool.   Genitourinary: Negative for dysuria.   Musculoskeletal: Negative for back pain.   Skin: Negative for rash.   Neurological: Negative for headaches.       Physical Exam     Initial Vitals [19 1749]   BP Pulse Resp Temp SpO2   (!) 171/119 (!) 115 18 98 °F (36.7 °C) 97 %      MAP       --         Physical Exam    Nursing note and vitals reviewed.  Constitutional: She is not diaphoretic. No distress.   HENT:   Head: Normocephalic and atraumatic.   Mouth/Throat: Oropharynx is clear and moist.   Eyes: EOM are normal. Pupils are equal, round, and reactive to light. No scleral icterus.   Neck: Normal range of motion. Neck supple. No JVD present.   Cardiovascular: Normal rate and regular rhythm.   Pulmonary/Chest: Breath sounds normal. No stridor. No respiratory distress.   Abdominal: Soft. Bowel sounds are normal. She exhibits no distension. There is no rebound and no guarding.   Mild generalized abdominal pain   Musculoskeletal: Normal range of motion. She exhibits no edema or tenderness.   Neurological: She is alert and oriented to person, place, and time. She has normal strength. No cranial nerve deficit or sensory deficit.   Skin:  Skin is warm and dry.   Psychiatric: She has a normal mood and affect.         ED Course   Procedures  Labs Reviewed   CBC W/ AUTO DIFFERENTIAL - Abnormal; Notable for the following components:       Result Value    MPV 8.7 (*)     All other components within normal limits   COMPREHENSIVE METABOLIC PANEL - Abnormal; Notable for the following components:    CO2 20 (*)     Glucose 130 (*)     All other components within normal limits   URINALYSIS, REFLEX TO URINE CULTURE - Abnormal; Notable for the following components:    Appearance, UA Hazy (*)     Occult Blood UA 1+ (*)     All other components within normal limits    Narrative:     Preferred Collection Type->Urine, Clean Catch   LIPASE   HCG, QUANTITATIVE, PREGNANCY   URINALYSIS MICROSCOPIC    Narrative:     Preferred Collection Type->Urine, Clean Catch   POCT URINE PREGNANCY          Imaging Results          X-Ray Abdomen AP 1 View (KUB) (Final result)  Result time 02/23/19 19:30:37    Final result by Deisi Mckeon MD (02/23/19 19:30:37)                 Impression:      Nonobstructive bowel gas pattern.      Electronically signed by: Deisi Mckeon  Date:    02/23/2019  Time:    19:30             Narrative:    EXAMINATION:  XR ABDOMEN AP 1 VIEW    CLINICAL HISTORY:  Constipation, unspecified    TECHNIQUE:  AP View(s) of the abdomen was performed.    COMPARISON:  None    FINDINGS:  Abdomen AP supine imaging demonstrates a nonobstructive bowel gas pattern.  There are no dilated loops of small bowel or air-fluid levels detected.  Cholecystectomy clips are seen.  The lung bases appear clear.                                            Scribe Attestation:   Scribe #1: I performed the above scribed service and the documentation accurately describes the services I performed. I attest to the accuracy of the note.    Attending Attestation:           Physician Attestation for Scribe:  Physician Attestation Statement for Scribe #1: I, Wesley Capellan MD, reviewed  documentation, as scribed by Herberth Faustin  in my presence, and it is both accurate and complete.          at 8:32 p.m.:  Patient had results from the enema she continues to complain of abdominal pain labs reviewed lipases normal no white count KUB no free air obstruction no real comment regarding constipation urinalysis is normal.    Medical decision making:  Patient with the complaint of constipation and associated abdominal pain now resolved and encouraged to alter her diet increase her fiber Colace prescribed discharged home follow up with primary doctor.  Recommended Tylenol for pain over-the-counter.  No evidence of recurrent pancreatitis bowel obstruction intra-abdominal infection cholecystitis urinary tract infection.                   Clinical Impression:       ICD-10-CM ICD-9-CM   1. Constipation K59.00 564.00   2. Pain of upper abdomen R10.10 789.09   3. Essential hypertension I10 401.9               I, Dr. Wesley Capellan, personally performed the services described in this documentation.   All medical record entries made by the scribe were at my direction and in my presence.   I have reviewed the chart and agree that the record is accurate and complete.   Wesley Capellan MD.  8:33 PM 02/23/2019                Wesley Capellan MD  02/23/19 2028       Wesley Capellan MD  02/23/19 2033

## 2019-03-08 ENCOUNTER — OFFICE VISIT (OUTPATIENT)
Dept: URGENT CARE | Facility: CLINIC | Age: 34
End: 2019-03-08
Payer: MEDICAID

## 2019-03-08 VITALS
TEMPERATURE: 98 F | HEART RATE: 102 BPM | BODY MASS INDEX: 35.67 KG/M2 | SYSTOLIC BLOOD PRESSURE: 168 MMHG | OXYGEN SATURATION: 98 % | WEIGHT: 195 LBS | DIASTOLIC BLOOD PRESSURE: 110 MMHG

## 2019-03-08 DIAGNOSIS — R04.0 ACUTE ANTERIOR EPISTAXIS: ICD-10-CM

## 2019-03-08 DIAGNOSIS — T78.40XA ALLERGIC STATE, INITIAL ENCOUNTER: Primary | ICD-10-CM

## 2019-03-08 DIAGNOSIS — I10 HYPERTENSION, UNSPECIFIED TYPE: ICD-10-CM

## 2019-03-08 PROCEDURE — 99213 PR OFFICE/OUTPT VISIT, EST, LEVL III, 20-29 MIN: ICD-10-PCS | Mod: S$GLB,,, | Performed by: PHYSICIAN ASSISTANT

## 2019-03-08 PROCEDURE — 99213 OFFICE O/P EST LOW 20 MIN: CPT | Mod: S$GLB,,, | Performed by: PHYSICIAN ASSISTANT

## 2019-03-08 RX ORDER — AMOXICILLIN AND CLAVULANATE POTASSIUM 500; 125 MG/1; MG/1
1 TABLET, FILM COATED ORAL 3 TIMES DAILY
Qty: 30 TABLET | Refills: 0 | Status: SHIPPED | OUTPATIENT
Start: 2019-03-08 | End: 2019-03-18

## 2019-03-08 RX ORDER — LEVOCETIRIZINE DIHYDROCHLORIDE 5 MG/1
5 TABLET, FILM COATED ORAL NIGHTLY
Qty: 30 TABLET | Refills: 0 | Status: SHIPPED | OUTPATIENT
Start: 2019-03-08 | End: 2020-02-11 | Stop reason: CLARIF

## 2019-03-08 NOTE — PATIENT INSTRUCTIONS
Controlling Allergens: In the Home  Even a clean home can be full of allergens, so take a moment to see what you can do to cut down on allergens in each room of your home. Try to avoid things like cigarette smoke and perfume. They can irritate your eyes, nose, throat, and lungs and make your allergies worse.  · Buy an air purifier with a HEPA filter. Look in consumer magazines for recommendations. Avoid vaporizers and humidifiers, since they encourage mold and dust-mite growth.  · Use shades or vertical blinds instead of horizontal blinds, which collect dust. Replace drapes with curtains that can be washed regularly.  · Enclose mattresses, box springs, and pillows in allergy-proof casings. Use washable blankets and quilts. Avoid feather pillows, down comforters, and wool blankets.  · Avoid dust-catching clutter. Have enclosed places to keep books, toys, and clothes. Keep closet doors closed.  · Use washable throw rugs wherever possible, or have bare floors.  · Put filters over forced-air heating vents. Change the filters regularly.  · Keep your car clean. Vacuum the seats and carpets regularly. If you have air conditioning, use it instead of opening the windows.  · Keep rain gutters clean. Remove leaves and debris that can grow mold.  · Check stored food for spoilage and mold growth. Clean up spills right away.  · Don't let wet clothing sit and grow mold. And don't hang clothes outside to dry where they can collect airborne pollen. Dry clothing immediately in a clothes dryer that's vented to the outside.  · Install a fan to keep the bathroom well ventilated.        Avoid yard work and pulling weeds. These and other outdoor activities increase your exposure to pollen. If thats not possible, wear a filter mask. When youre done, bathe, wash your hair, and change your clothes.      Date Last Reviewed: 9/1/2016  © 8454-7677 [employer]. [address] [place] [address], [place] [address]. All rights reserved.  This information is not intended as a substitute for professional medical care. Always follow your healthcare professional's instructions.        Nosebleed (Adult)    Bleeding from the nose most commonly occurs because of injury or drying and cracking of the inner lining of the nose. Most nosebleeds are because of dry air or nose-picking. They can occur during a common cold or an allergy attack. They can also occur on a very hot day, or from dry air in the winter.  If the bleeding site is found, it may be cauterized. This means it is treated to cause a blood clot to form. This may be done with a chemical, heat, or electricity. If the bleeding continues after the site is cauterized, or if the site cannot be found, packing may be put in your nose. This is to apply pressure and stop the bleeding. The packing may be made of gauze or sponge. A small balloon catheter is sometimes used. These must be removed by your doctor. Some types of packing dissolve on their own.  Home care  · If packing was put in your nose, unless told otherwise, do not pull on it or try to remove it yourself. You will be given an appointment to have it removed. You may also have been given antibiotics to prevent a sinus infection. If so, finish all of the medicine.  · Do not blow your nose for 12 hours after the bleeding stops. This will allow a strong blood clot to form. Do not pick your nose. This may restart bleeding.  · Avoid drinking alcohol and hot liquids for the next 2 days. Alcohol or hot liquids in your mouth can dilate blood vessels in your nose. This can cause bleeding to start again.  · Do not take ibuprofen, naproxen, or medicines that contain aspirin. These thin the blood and may cause your nose to bleed. You may take acetaminophen for pain, unless another pain medicine was prescribed.  · If the bleeding starts again, sit up and lean forward to prevent swallowing blood. Pinch your nose tightly on both sides, as shown above, for 10 to  15 minutes. Time yourself. Dont release the pressure on your nose until 10 minutes is up. If bleeding does not stop, continue to pinch your nose and call your healthcare provider or return to this facility.  · If you have a cold, allergies, or dry nasal membranes, lubricate the nasal passages. Apply a small amount of petroleum jelly inside the nose with a cotton swab twice a day (morning and night).  · Avoid overheating your home. This can dry the air and make your condition worse.  · Put a humidifier in the room where you sleep. This will add moisture to the air.  · Use a saline nasal spray to keep nasal passages moist.  · Do not pick your nose. Keep fingernails trimmed to decrease risk of bleeds.  · Do not smoke.  Follow-up care  Follow up with your healthcare provider, or as advised. Nasal packing should be rechecked or removed within 2 to 3 days.  When to seek medical advice  Call your healthcare provider right away if any of these occur.  · You have another nosebleed that you cannot control  · Dizziness, weakness, or fainting  · You become tired or confused  · Fever of 100.4ºF (38ºC) or higher, or as directed by your healthcare provider  · Headache  · Sinus or facial pain  · Shortness of breath or trouble breathing  Date Last Reviewed: 3/22/2015  © 3403-5720 [employer]. [address] [place] [address], [place] [id number]. All rights reserved. This information is not intended as a substitute for professional medical care. Always follow your healthcare professional's instructions.        Discharge Instructions: Taking Your Blood Pressure  Blood pressure is the force of blood as it moves from the heart through the blood vessels. You can take your own blood pressure reading using a digital monitor. Take readings as often as your healthcare provider instructs. Take your readings each time in the same way, using the same arm. Here are guidelines for taking your blood pressure.  The American Heart Association  (AHA) recommends purchasing a blood pressure monitor that is validated and approved by the Association for the Advancement of Medical Instrumentation, the [place] Hypertension Society, and the International Protocol for the Validation of Automated BP Measuring Devices. If the blood pressure monitor is for a senior adult, a pregnant woman, or a child, make certain it is validated for use with such a population. For the most reliable readings, the AHA recommends an automatic, cuff-style, upper arm (bicep) monitor. The readings from finger and wrist monitors are not as reliable as the upper arm monitor.        Step 1. Relax    · Wait at least a half hour after smoking, eating, or exercising. Do not drink coffee, tea, soda, or other caffeinated beverages before checking your blood pressure.   · Sit comfortably at a table. Place the monitor near you.  · Rest for a few minutes before you begin.        Step 2. Wrap the cuff    · Place your arm on the table, palm up. Put your arm in a position that is level with your heart. Wrap the cuff around your upper arm, about an inch above your elbow. Its best to wrap the cuff on bare skin, not over clothing.  · Make sure your cuff fits. If it doesnt wrap around your upper arm, order a larger cuff. A cuff that is too large or too small can result in an inaccurate blood pressure reading.           Step 3. Inflate the cuff    · Pump the cuff until the scale reads 200. If you have a self-inflating cuff, push the button that starts the pump.  · The cuff will tighten, then loosen.  · The numbers will change. When they stop changing, your blood pressure reading will appear.  · If you get a reading that is too high or too low for you, relax for a few minutes. Then do the test again.    Step 4. Write down the results  · Write down your blood pressure numbers. Donnie the date and time. Keep your results in one place, such as a notebook.  · Remove the cuff from your arm. Turn off the  machine.  · Take the readings with you to your medical appointments.  · If you start a new blood pressure medicine, or change a blood pressure medicine dose, note the day you started the new drug or dosage on your blood pressure recording sheet. This will help your healthcare provider monitor the effect of medication changes.     Date Last Reviewed: 4/27/2016  © 8320-5734 [employer]. [address] [place] [address]. All rights reserved. This information is not intended as a substitute for professional medical care. Always follow your healthcare professional's instructions.

## 2019-03-08 NOTE — PROGRESS NOTES
Subjective:       Patient ID: Samantha Sr is a 34 y.o. female.    Vitals:  weight is 88.5 kg (195 lb). Her temperature is 98.2 °F (36.8 °C). Her blood pressure is 168/110 (abnormal) and her pulse is 102. Her oxygen saturation is 98%.     Chief Complaint: URI    Pt states that she started with allergies now for about a month with no improvements with OTC meds      URI    This is a new problem. The current episode started 1 to 4 weeks ago. The problem has been gradually worsening. Associated symptoms include congestion, coughing, sinus pain and sneezing. Pertinent negatives include no ear pain, nausea, rash, sore throat, vomiting or wheezing. She has tried antihistamine for the symptoms. The treatment provided no relief.       Constitution: Negative for chills, sweating, fatigue and fever.   HENT: Positive for congestion, sinus pain and sinus pressure. Negative for ear pain, sore throat and voice change.    Neck: Negative for painful lymph nodes.   Eyes: Negative for eye redness.   Respiratory: Positive for cough. Negative for chest tightness, sputum production, bloody sputum, COPD, shortness of breath, stridor, wheezing and asthma.    Gastrointestinal: Negative for nausea and vomiting.   Musculoskeletal: Negative for muscle ache.   Skin: Negative for rash.   Allergic/Immunologic: Positive for sneezing. Negative for seasonal allergies and asthma.   Hematologic/Lymphatic: Negative for swollen lymph nodes.       Patient states that she has had allergy sx for the past month and has tried Claritin and Nasonex as well as another steroid for her allergies. When these did not work, she switched to Benadryl. Despite taking both her HTN meds, her BP is very high today - most likely due to these meds. Patient also states that her nostrils are very dry and itchy and she had epistaxis this morning for about an hour.     Objective:      Physical Exam   Constitutional: She is oriented to person, place, and time. She appears  well-developed and well-nourished. She is cooperative.  Non-toxic appearance. She does not appear ill. No distress.   HENT:   Head: Normocephalic and atraumatic.   Right Ear: Hearing, tympanic membrane, external ear and ear canal normal.   Left Ear: Hearing, tympanic membrane, external ear and ear canal normal.   Nose: Nose normal. No mucosal edema, rhinorrhea or nasal deformity. No epistaxis. Right sinus exhibits no maxillary sinus tenderness and no frontal sinus tenderness. Left sinus exhibits no maxillary sinus tenderness and no frontal sinus tenderness.   Mouth/Throat: Uvula is midline, oropharynx is clear and moist and mucous membranes are normal. No trismus in the jaw. Normal dentition. No uvula swelling. No posterior oropharyngeal erythema.   The superior aspect of bilateral nostrils show areas where epistaxis occurred earlier today. No signs of recent trauma.   Eyes: Conjunctivae and lids are normal. No scleral icterus.   Sclera clear bilat   Neck: Trachea normal, full passive range of motion without pain and phonation normal. Neck supple.   Cardiovascular: Normal rate, regular rhythm, normal heart sounds, intact distal pulses and normal pulses.   Pulmonary/Chest: Effort normal and breath sounds normal. No respiratory distress. She has no decreased breath sounds. She has no wheezes.   Abdominal: Soft. Normal appearance and bowel sounds are normal. She exhibits no distension. There is no tenderness.   Musculoskeletal: Normal range of motion. She exhibits no edema or deformity.   Neurological: She is alert and oriented to person, place, and time. She exhibits normal muscle tone. Coordination normal.   Skin: Skin is warm, dry and intact. She is not diaphoretic. No pallor.   Psychiatric: She has a normal mood and affect. Her speech is normal and behavior is normal. Judgment and thought content normal. Cognition and memory are normal.   Nursing note and vitals reviewed.      Assessment:       1. Allergic state,  initial encounter    2. Acute anterior epistaxis    3. Hypertension, unspecified type        Plan:       Patient was advised to stop taking all previous allergy meds until her BP returns to her normal state. She is given new meds that should not affect her BP. She is advised to check her BP by noon today. If it still remains high, she is advised to go to the ER. She will also need to follow up with her PCP. And she will receive a referral to ENT/allergist.    Allergic state, initial encounter  -     levocetirizine (XYZAL) 5 MG tablet; Take 1 tablet (5 mg total) by mouth every evening.  Dispense: 30 tablet; Refill: 0  -     dextromethorphan-guaifenesin (CORICIDIN HBP)  mg Cap; Take 1 Dose by mouth every 4 to 6 hours as needed.; Refill: 0  -     Ambulatory referral to Allergy    Acute anterior epistaxis  -     amoxicillin-clavulanate 500-125mg (AUGMENTIN) 500-125 mg Tab; Take 1 tablet (500 mg total) by mouth 3 (three) times daily. for 10 days  Dispense: 30 tablet; Refill: 0  -     Ambulatory referral to ENT    Hypertension, unspecified type      Patient Instructions       Controlling Allergens: In the Home  Even a clean home can be full of allergens, so take a moment to see what you can do to cut down on allergens in each room of your home. Try to avoid things like cigarette smoke and perfume. They can irritate your eyes, nose, throat, and lungs and make your allergies worse.  · Buy an air purifier with a HEPA filter. Look in consumer magazines for recommendations. Avoid vaporizers and humidifiers, since they encourage mold and dust-mite growth.  · Use shades or vertical blinds instead of horizontal blinds, which collect dust. Replace drapes with curtains that can be washed regularly.  · Enclose mattresses, box springs, and pillows in allergy-proof casings. Use washable blankets and quilts. Avoid feather pillows, down comforters, and wool blankets.  · Avoid dust-catching clutter. Have enclosed places to keep  books, toys, and clothes. Keep closet doors closed.  · Use washable throw rugs wherever possible, or have bare floors.  · Put filters over forced-air heating vents. Change the filters regularly.  · Keep your car clean. Vacuum the seats and carpets regularly. If you have air conditioning, use it instead of opening the windows.  · Keep rain gutters clean. Remove leaves and debris that can grow mold.  · Check stored food for spoilage and mold growth. Clean up spills right away.  · Don't let wet clothing sit and grow mold. And don't hang clothes outside to dry where they can collect airborne pollen. Dry clothing immediately in a clothes dryer that's vented to the outside.  · Install a fan to keep the bathroom well ventilated.        Avoid yard work and pulling weeds. These and other outdoor activities increase your exposure to pollen. If thats not possible, wear a filter mask. When youre done, bathe, wash your hair, and change your clothes.      Date Last Reviewed: 9/1/2016 © 2000-2017 [employer]. [address] [place] [address]. All rights reserved. This information is not intended as a substitute for professional medical care. Always follow your healthcare professional's instructions.        Nosebleed (Adult)    Bleeding from the nose most commonly occurs because of injury or drying and cracking of the inner lining of the nose. Most nosebleeds are because of dry air or nose-picking. They can occur during a common cold or an allergy attack. They can also occur on a very hot day, or from dry air in the winter.  If the bleeding site is found, it may be cauterized. This means it is treated to cause a blood clot to form. This may be done with a chemical, heat, or electricity. If the bleeding continues after the site is cauterized, or if the site cannot be found, packing may be put in your nose. This is to apply pressure and stop the bleeding. The packing may be made of gauze or sponge. A small  balloon catheter is sometimes used. These must be removed by your doctor. Some types of packing dissolve on their own.  Home care  · If packing was put in your nose, unless told otherwise, do not pull on it or try to remove it yourself. You will be given an appointment to have it removed. You may also have been given antibiotics to prevent a sinus infection. If so, finish all of the medicine.  · Do not blow your nose for 12 hours after the bleeding stops. This will allow a strong blood clot to form. Do not pick your nose. This may restart bleeding.  · Avoid drinking alcohol and hot liquids for the next 2 days. Alcohol or hot liquids in your mouth can dilate blood vessels in your nose. This can cause bleeding to start again.  · Do not take ibuprofen, naproxen, or medicines that contain aspirin. These thin the blood and may cause your nose to bleed. You may take acetaminophen for pain, unless another pain medicine was prescribed.  · If the bleeding starts again, sit up and lean forward to prevent swallowing blood. Pinch your nose tightly on both sides, as shown above, for 10 to 15 minutes. Time yourself. Dont release the pressure on your nose until 10 minutes is up. If bleeding does not stop, continue to pinch your nose and call your healthcare provider or return to this facility.  · If you have a cold, allergies, or dry nasal membranes, lubricate the nasal passages. Apply a small amount of petroleum jelly inside the nose with a cotton swab twice a day (morning and night).  · Avoid overheating your home. This can dry the air and make your condition worse.  · Put a humidifier in the room where you sleep. This will add moisture to the air.  · Use a saline nasal spray to keep nasal passages moist.  · Do not pick your nose. Keep fingernails trimmed to decrease risk of bleeds.  · Do not smoke.  Follow-up care  Follow up with your healthcare provider, or as advised. Nasal packing should be rechecked or removed within 2 to  3 days.  When to seek medical advice  Call your healthcare provider right away if any of these occur.  · You have another nosebleed that you cannot control  · Dizziness, weakness, or fainting  · You become tired or confused  · Fever of 100.4ºF (38ºC) or higher, or as directed by your healthcare provider  · Headache  · Sinus or facial pain  · Shortness of breath or trouble breathing  Date Last Reviewed: 3/22/2015  © 1761-0400 [employer]. [address] [place] [address]. All rights reserved. This information is not intended as a substitute for professional medical care. Always follow your healthcare professional's instructions.        Discharge Instructions: Taking Your Blood Pressure  Blood pressure is the force of blood as it moves from the heart through the blood vessels. You can take your own blood pressure reading using a digital monitor. Take readings as often as your healthcare provider instructs. Take your readings each time in the same way, using the same arm. Here are guidelines for taking your blood pressure.  The American Heart Association (AHA) recommends purchasing a blood pressure monitor that is validated and approved by the Association for the Advancement of Medical Instrumentation, the [place] Hypertension Society, and the International Protocol for the Validation of Automated BP Measuring Devices. If the blood pressure monitor is for a senior adult, a pregnant woman, or a child, make certain it is validated for use with such a population. For the most reliable readings, the AHA recommends an automatic, cuff-style, upper arm (bicep) monitor. The readings from finger and wrist monitors are not as reliable as the upper arm monitor.        Step 1. Relax    · Wait at least a half hour after smoking, eating, or exercising. Do not drink coffee, tea, soda, or other caffeinated beverages before checking your blood pressure.   · Sit comfortably at a table. Place the monitor near  you.  · Rest for a few minutes before you begin.        Step 2. Wrap the cuff    · Place your arm on the table, palm up. Put your arm in a position that is level with your heart. Wrap the cuff around your upper arm, about an inch above your elbow. Its best to wrap the cuff on bare skin, not over clothing.  · Make sure your cuff fits. If it doesnt wrap around your upper arm, order a larger cuff. A cuff that is too large or too small can result in an inaccurate blood pressure reading.           Step 3. Inflate the cuff    · Pump the cuff until the scale reads 200. If you have a self-inflating cuff, push the button that starts the pump.  · The cuff will tighten, then loosen.  · The numbers will change. When they stop changing, your blood pressure reading will appear.  · If you get a reading that is too high or too low for you, relax for a few minutes. Then do the test again.    Step 4. Write down the results  · Write down your blood pressure numbers. Donnie the date and time. Keep your results in one place, such as a notebook.  · Remove the cuff from your arm. Turn off the machine.  · Take the readings with you to your medical appointments.  · If you start a new blood pressure medicine, or change a blood pressure medicine dose, note the day you started the new drug or dosage on your blood pressure recording sheet. This will help your healthcare provider monitor the effect of medication changes.     Date Last Reviewed: 4/27/2016 © 2000-2016 [employer]. [address] [place] [address], [place] [address]. All rights reserved. This information is not intended as a substitute for professional medical care. Always follow your healthcare professional's instructions.          Please follow up with your Primary care provider within 2-5 days if your signs and symptoms have not resolved or worsen.     If your condition worsens or fails to improve we recommend that you receive another evaluation at the emergency room  immediately or contact your primary medical clinic to discuss your concerns.   You must understand that you have received an Urgent Care treatment only and that you may be released before all of your medical problems are known or treated. You, the patient, will arrange for follow up care as instructed.     RED FLAGS/WARNING SYMPTOMS DISCUSSED WITH PATIENT THAT WOULD WARRANT EMERGENT MEDICAL ATTENTION. PATIENT VERBALIZED UNDERSTANDING.

## 2019-04-08 ENCOUNTER — HOSPITAL ENCOUNTER (INPATIENT)
Facility: HOSPITAL | Age: 34
LOS: 2 days | Discharge: HOME | DRG: 103 | End: 2019-04-10
Attending: EMERGENCY MEDICINE | Admitting: EMERGENCY MEDICINE
Payer: MEDICAID

## 2019-04-08 DIAGNOSIS — R11.2 INTRACTABLE VOMITING WITH NAUSEA, UNSPECIFIED VOMITING TYPE: ICD-10-CM

## 2019-04-08 DIAGNOSIS — R51.9 ACUTE INTRACTABLE HEADACHE, UNSPECIFIED HEADACHE TYPE: Primary | ICD-10-CM

## 2019-04-08 DIAGNOSIS — G03.9 MENINGITIS: ICD-10-CM

## 2019-04-08 DIAGNOSIS — R51.9 ACUTE NONINTRACTABLE HEADACHE, UNSPECIFIED HEADACHE TYPE: ICD-10-CM

## 2019-04-08 LAB
B-HCG UR QL: NEGATIVE
BASOPHILS # BLD AUTO: 0.03 K/UL (ref 0–0.2)
BASOPHILS NFR BLD: 0.2 % (ref 0–1.9)
CTP QC/QA: YES
DIFFERENTIAL METHOD: ABNORMAL
EOSINOPHIL # BLD AUTO: 0.4 K/UL (ref 0–0.5)
EOSINOPHIL NFR BLD: 2.5 % (ref 0–8)
ERYTHROCYTE [DISTWIDTH] IN BLOOD BY AUTOMATED COUNT: 13.4 % (ref 11.5–14.5)
HCT VFR BLD AUTO: 39.8 % (ref 37–48.5)
HGB BLD-MCNC: 13.5 G/DL (ref 12–16)
LYMPHOCYTES # BLD AUTO: 4.9 K/UL (ref 1–4.8)
LYMPHOCYTES NFR BLD: 34.6 % (ref 18–48)
MCH RBC QN AUTO: 31.5 PG (ref 27–31)
MCHC RBC AUTO-ENTMCNC: 33.9 G/DL (ref 32–36)
MCV RBC AUTO: 93 FL (ref 82–98)
MONOCYTES # BLD AUTO: 0.8 K/UL (ref 0.3–1)
MONOCYTES NFR BLD: 5.7 % (ref 4–15)
NEUTROPHILS # BLD AUTO: 8.1 K/UL (ref 1.8–7.7)
NEUTROPHILS NFR BLD: 57 % (ref 38–73)
PLATELET # BLD AUTO: 299 K/UL (ref 150–350)
PMV BLD AUTO: 8.8 FL (ref 9.2–12.9)
RBC # BLD AUTO: 4.28 M/UL (ref 4–5.4)
WBC # BLD AUTO: 14.15 K/UL (ref 3.9–12.7)

## 2019-04-08 PROCEDURE — 80053 COMPREHEN METABOLIC PANEL: CPT

## 2019-04-08 PROCEDURE — 96372 THER/PROPH/DIAG INJ SC/IM: CPT | Mod: 59

## 2019-04-08 PROCEDURE — 85025 COMPLETE CBC W/AUTO DIFF WBC: CPT

## 2019-04-08 PROCEDURE — 96375 TX/PRO/DX INJ NEW DRUG ADDON: CPT

## 2019-04-08 PROCEDURE — 25000003 PHARM REV CODE 250: Performed by: EMERGENCY MEDICINE

## 2019-04-08 PROCEDURE — 12000002 HC ACUTE/MED SURGE SEMI-PRIVATE ROOM

## 2019-04-08 PROCEDURE — 63600175 PHARM REV CODE 636 W HCPCS: Performed by: EMERGENCY MEDICINE

## 2019-04-08 PROCEDURE — 81025 URINE PREGNANCY TEST: CPT | Performed by: EMERGENCY MEDICINE

## 2019-04-08 PROCEDURE — 99285 EMERGENCY DEPT VISIT HI MDM: CPT | Mod: 25

## 2019-04-08 RX ORDER — PROCHLORPERAZINE EDISYLATE 5 MG/ML
10 INJECTION INTRAMUSCULAR; INTRAVENOUS ONCE
Status: DISCONTINUED | OUTPATIENT
Start: 2019-04-09 | End: 2019-04-08

## 2019-04-08 RX ORDER — KETOROLAC TROMETHAMINE 30 MG/ML
15 INJECTION, SOLUTION INTRAMUSCULAR; INTRAVENOUS
Status: COMPLETED | OUTPATIENT
Start: 2019-04-08 | End: 2019-04-08

## 2019-04-08 RX ORDER — DIPHENHYDRAMINE HYDROCHLORIDE 50 MG/ML
25 INJECTION INTRAMUSCULAR; INTRAVENOUS
Status: DISCONTINUED | OUTPATIENT
Start: 2019-04-08 | End: 2019-04-08

## 2019-04-08 RX ORDER — SUMATRIPTAN 6 MG/.5ML
6 INJECTION, SOLUTION SUBCUTANEOUS
Status: COMPLETED | OUTPATIENT
Start: 2019-04-08 | End: 2019-04-08

## 2019-04-08 RX ORDER — ONDANSETRON 2 MG/ML
4 INJECTION INTRAMUSCULAR; INTRAVENOUS
Status: COMPLETED | OUTPATIENT
Start: 2019-04-09 | End: 2019-04-08

## 2019-04-08 RX ADMIN — SUMATRIPTAN 6 MG: 6 INJECTION, SOLUTION SUBCUTANEOUS at 11:04

## 2019-04-08 RX ADMIN — ONDANSETRON 4 MG: 2 INJECTION INTRAMUSCULAR; INTRAVENOUS at 11:04

## 2019-04-08 RX ADMIN — KETOROLAC TROMETHAMINE 15 MG: 30 INJECTION, SOLUTION INTRAMUSCULAR; INTRAVENOUS at 11:04

## 2019-04-08 RX ADMIN — SODIUM CHLORIDE 1000 ML: 0.9 INJECTION, SOLUTION INTRAVENOUS at 11:04

## 2019-04-09 PROBLEM — I10 ESSENTIAL HYPERTENSION: Chronic | Status: ACTIVE | Noted: 2019-04-09

## 2019-04-09 PROBLEM — Z72.0 TOBACCO ABUSE: Chronic | Status: ACTIVE | Noted: 2019-04-09

## 2019-04-09 PROBLEM — G03.9 MENINGITIS: Status: ACTIVE | Noted: 2019-04-09

## 2019-04-09 PROBLEM — R51.9 HEADACHE: Status: ACTIVE | Noted: 2019-04-09

## 2019-04-09 PROBLEM — R51.9 ACUTE INTRACTABLE HEADACHE: Status: ACTIVE | Noted: 2019-04-09

## 2019-04-09 PROBLEM — E66.9 OBESITY, CLASS II, BMI 35-39.9: Chronic | Status: ACTIVE | Noted: 2019-04-09

## 2019-04-09 LAB
ALBUMIN SERPL BCP-MCNC: 4.2 G/DL (ref 3.5–5.2)
ALP SERPL-CCNC: 89 U/L (ref 55–135)
ALT SERPL W/O P-5'-P-CCNC: 37 U/L (ref 10–44)
ANION GAP SERPL CALC-SCNC: 10 MMOL/L (ref 8–16)
ANION GAP SERPL CALC-SCNC: 12 MMOL/L (ref 8–16)
AST SERPL-CCNC: 28 U/L (ref 10–40)
BASOPHILS # BLD AUTO: 0.03 K/UL (ref 0–0.2)
BASOPHILS NFR BLD: 0.2 % (ref 0–1.9)
BILIRUB SERPL-MCNC: 0.2 MG/DL (ref 0.1–1)
BUN SERPL-MCNC: 18 MG/DL (ref 6–20)
BUN SERPL-MCNC: 19 MG/DL (ref 6–20)
CALCIUM SERPL-MCNC: 10 MG/DL (ref 8.7–10.5)
CALCIUM SERPL-MCNC: 10.6 MG/DL (ref 8.7–10.5)
CHLORIDE SERPL-SCNC: 101 MMOL/L (ref 95–110)
CHLORIDE SERPL-SCNC: 103 MMOL/L (ref 95–110)
CO2 SERPL-SCNC: 22 MMOL/L (ref 23–29)
CO2 SERPL-SCNC: 25 MMOL/L (ref 23–29)
CREAT SERPL-MCNC: 0.7 MG/DL (ref 0.5–1.4)
CREAT SERPL-MCNC: 0.8 MG/DL (ref 0.5–1.4)
DIFFERENTIAL METHOD: ABNORMAL
EOSINOPHIL # BLD AUTO: 0.1 K/UL (ref 0–0.5)
EOSINOPHIL NFR BLD: 1 % (ref 0–8)
ERYTHROCYTE [DISTWIDTH] IN BLOOD BY AUTOMATED COUNT: 13.4 % (ref 11.5–14.5)
EST. GFR  (AFRICAN AMERICAN): >60 ML/MIN/1.73 M^2
EST. GFR  (AFRICAN AMERICAN): >60 ML/MIN/1.73 M^2
EST. GFR  (NON AFRICAN AMERICAN): >60 ML/MIN/1.73 M^2
EST. GFR  (NON AFRICAN AMERICAN): >60 ML/MIN/1.73 M^2
GLUCOSE SERPL-MCNC: 125 MG/DL (ref 70–110)
GLUCOSE SERPL-MCNC: 198 MG/DL (ref 70–110)
HCT VFR BLD AUTO: 40.4 % (ref 37–48.5)
HGB BLD-MCNC: 13.6 G/DL (ref 12–16)
HIV1+2 IGG SERPL QL IA.RAPID: NEGATIVE
LYMPHOCYTES # BLD AUTO: 2.8 K/UL (ref 1–4.8)
LYMPHOCYTES NFR BLD: 20.5 % (ref 18–48)
MCH RBC QN AUTO: 31.6 PG (ref 27–31)
MCHC RBC AUTO-ENTMCNC: 33.7 G/DL (ref 32–36)
MCV RBC AUTO: 94 FL (ref 82–98)
MONOCYTES # BLD AUTO: 0.3 K/UL (ref 0.3–1)
MONOCYTES NFR BLD: 1.8 % (ref 4–15)
NEUTROPHILS # BLD AUTO: 10.6 K/UL (ref 1.8–7.7)
NEUTROPHILS NFR BLD: 76.5 % (ref 38–73)
PLATELET # BLD AUTO: 284 K/UL (ref 150–350)
PMV BLD AUTO: 9.2 FL (ref 9.2–12.9)
POTASSIUM SERPL-SCNC: 4 MMOL/L (ref 3.5–5.1)
POTASSIUM SERPL-SCNC: 4.1 MMOL/L (ref 3.5–5.1)
PROT SERPL-MCNC: 7.6 G/DL (ref 6–8.4)
RBC # BLD AUTO: 4.31 M/UL (ref 4–5.4)
SODIUM SERPL-SCNC: 135 MMOL/L (ref 136–145)
SODIUM SERPL-SCNC: 138 MMOL/L (ref 136–145)
VANCOMYCIN TROUGH SERPL-MCNC: <1.1 UG/ML (ref 10–22)
WBC # BLD AUTO: 13.87 K/UL (ref 3.9–12.7)

## 2019-04-09 PROCEDURE — 85025 COMPLETE CBC W/AUTO DIFF WBC: CPT

## 2019-04-09 PROCEDURE — 63600175 PHARM REV CODE 636 W HCPCS: Performed by: EMERGENCY MEDICINE

## 2019-04-09 PROCEDURE — 25000003 PHARM REV CODE 250: Performed by: EMERGENCY MEDICINE

## 2019-04-09 PROCEDURE — 63600175 PHARM REV CODE 636 W HCPCS: Performed by: PHYSICIAN ASSISTANT

## 2019-04-09 PROCEDURE — 80202 ASSAY OF VANCOMYCIN: CPT

## 2019-04-09 PROCEDURE — 99233 PR SUBSEQUENT HOSPITAL CARE,LEVL III: ICD-10-PCS | Mod: ,,, | Performed by: PHYSICIAN ASSISTANT

## 2019-04-09 PROCEDURE — 36415 COLL VENOUS BLD VENIPUNCTURE: CPT

## 2019-04-09 PROCEDURE — 99233 SBSQ HOSP IP/OBS HIGH 50: CPT | Mod: ,,, | Performed by: PHYSICIAN ASSISTANT

## 2019-04-09 PROCEDURE — 96375 TX/PRO/DX INJ NEW DRUG ADDON: CPT

## 2019-04-09 PROCEDURE — 25000003 PHARM REV CODE 250: Performed by: INTERNAL MEDICINE

## 2019-04-09 PROCEDURE — 11000001 HC ACUTE MED/SURG PRIVATE ROOM

## 2019-04-09 PROCEDURE — 25000003 PHARM REV CODE 250: Performed by: HOSPITALIST

## 2019-04-09 PROCEDURE — 80048 BASIC METABOLIC PNL TOTAL CA: CPT

## 2019-04-09 PROCEDURE — 63600175 PHARM REV CODE 636 W HCPCS: Performed by: INTERNAL MEDICINE

## 2019-04-09 PROCEDURE — 96376 TX/PRO/DX INJ SAME DRUG ADON: CPT

## 2019-04-09 PROCEDURE — 96365 THER/PROPH/DIAG IV INF INIT: CPT

## 2019-04-09 PROCEDURE — 94761 N-INVAS EAR/PLS OXIMETRY MLT: CPT

## 2019-04-09 PROCEDURE — 86703 HIV-1/HIV-2 1 RESULT ANTBDY: CPT

## 2019-04-09 PROCEDURE — 63600175 PHARM REV CODE 636 W HCPCS: Performed by: HOSPITALIST

## 2019-04-09 RX ORDER — SUMATRIPTAN 6 MG/.5ML
6 INJECTION, SOLUTION SUBCUTANEOUS
Status: COMPLETED | OUTPATIENT
Start: 2019-04-09 | End: 2019-04-09

## 2019-04-09 RX ORDER — RAMELTEON 8 MG/1
8 TABLET ORAL NIGHTLY PRN
Status: DISCONTINUED | OUTPATIENT
Start: 2019-04-09 | End: 2019-04-10 | Stop reason: HOSPADM

## 2019-04-09 RX ORDER — ONDANSETRON 2 MG/ML
4 INJECTION INTRAMUSCULAR; INTRAVENOUS
Status: COMPLETED | OUTPATIENT
Start: 2019-04-09 | End: 2019-04-09

## 2019-04-09 RX ORDER — CLONIDINE HYDROCHLORIDE 0.1 MG/1
0.1 TABLET ORAL EVERY 4 HOURS PRN
Status: DISCONTINUED | OUTPATIENT
Start: 2019-04-09 | End: 2019-04-10 | Stop reason: HOSPADM

## 2019-04-09 RX ORDER — LISINOPRIL 5 MG/1
10 TABLET ORAL DAILY
Status: DISCONTINUED | OUTPATIENT
Start: 2019-04-09 | End: 2019-04-09

## 2019-04-09 RX ORDER — IBUPROFEN 200 MG
1 TABLET ORAL DAILY
Status: DISCONTINUED | OUTPATIENT
Start: 2019-04-09 | End: 2019-04-10 | Stop reason: HOSPADM

## 2019-04-09 RX ORDER — MORPHINE SULFATE 10 MG/ML
4 INJECTION INTRAMUSCULAR; INTRAVENOUS; SUBCUTANEOUS
Status: COMPLETED | OUTPATIENT
Start: 2019-04-09 | End: 2019-04-09

## 2019-04-09 RX ORDER — AMOXICILLIN 250 MG
1 CAPSULE ORAL 2 TIMES DAILY
Status: DISCONTINUED | OUTPATIENT
Start: 2019-04-09 | End: 2019-04-09

## 2019-04-09 RX ORDER — ENOXAPARIN SODIUM 100 MG/ML
40 INJECTION SUBCUTANEOUS EVERY 24 HOURS
Status: DISCONTINUED | OUTPATIENT
Start: 2019-04-09 | End: 2019-04-10

## 2019-04-09 RX ORDER — SODIUM CHLORIDE 0.9 % (FLUSH) 0.9 %
10 SYRINGE (ML) INJECTION
Status: DISCONTINUED | OUTPATIENT
Start: 2019-04-09 | End: 2019-04-09

## 2019-04-09 RX ORDER — SODIUM CHLORIDE 9 MG/ML
INJECTION, SOLUTION INTRAVENOUS CONTINUOUS
Status: DISCONTINUED | OUTPATIENT
Start: 2019-04-09 | End: 2019-04-09

## 2019-04-09 RX ORDER — MORPHINE SULFATE 10 MG/ML
2 INJECTION INTRAMUSCULAR; INTRAVENOUS; SUBCUTANEOUS EVERY 4 HOURS PRN
Status: DISCONTINUED | OUTPATIENT
Start: 2019-04-09 | End: 2019-04-09

## 2019-04-09 RX ORDER — MORPHINE SULFATE 10 MG/ML
1 INJECTION INTRAMUSCULAR; INTRAVENOUS; SUBCUTANEOUS ONCE
Status: COMPLETED | OUTPATIENT
Start: 2019-04-09 | End: 2019-04-09

## 2019-04-09 RX ORDER — FLUOXETINE HYDROCHLORIDE 20 MG/1
20 CAPSULE ORAL DAILY
Status: DISCONTINUED | OUTPATIENT
Start: 2019-04-09 | End: 2019-04-10 | Stop reason: HOSPADM

## 2019-04-09 RX ORDER — TRAMADOL HYDROCHLORIDE 50 MG/1
50 TABLET ORAL EVERY 6 HOURS PRN
Status: DISCONTINUED | OUTPATIENT
Start: 2019-04-09 | End: 2019-04-10 | Stop reason: HOSPADM

## 2019-04-09 RX ORDER — ONDANSETRON 2 MG/ML
4 INJECTION INTRAMUSCULAR; INTRAVENOUS EVERY 8 HOURS PRN
Status: DISCONTINUED | OUTPATIENT
Start: 2019-04-09 | End: 2019-04-09

## 2019-04-09 RX ORDER — MORPHINE SULFATE 10 MG/ML
1 INJECTION INTRAMUSCULAR; INTRAVENOUS; SUBCUTANEOUS ONCE AS NEEDED
Status: COMPLETED | OUTPATIENT
Start: 2019-04-09 | End: 2019-04-09

## 2019-04-09 RX ORDER — ONDANSETRON 2 MG/ML
8 INJECTION INTRAMUSCULAR; INTRAVENOUS EVERY 8 HOURS PRN
Status: DISCONTINUED | OUTPATIENT
Start: 2019-04-09 | End: 2019-04-10 | Stop reason: HOSPADM

## 2019-04-09 RX ORDER — LABETALOL HYDROCHLORIDE 5 MG/ML
20 INJECTION, SOLUTION INTRAVENOUS
Status: COMPLETED | OUTPATIENT
Start: 2019-04-09 | End: 2019-04-09

## 2019-04-09 RX ORDER — ACETAMINOPHEN 500 MG
500 TABLET ORAL EVERY 6 HOURS PRN
Status: DISCONTINUED | OUTPATIENT
Start: 2019-04-09 | End: 2019-04-10 | Stop reason: HOSPADM

## 2019-04-09 RX ORDER — FAMOTIDINE 10 MG/ML
20 INJECTION INTRAVENOUS EVERY 12 HOURS
Status: DISCONTINUED | OUTPATIENT
Start: 2019-04-09 | End: 2019-04-09

## 2019-04-09 RX ORDER — AMOXICILLIN 250 MG
1 CAPSULE ORAL 2 TIMES DAILY PRN
Status: DISCONTINUED | OUTPATIENT
Start: 2019-04-09 | End: 2019-04-10 | Stop reason: HOSPADM

## 2019-04-09 RX ORDER — LISINOPRIL 20 MG/1
20 TABLET ORAL DAILY
Status: DISCONTINUED | OUTPATIENT
Start: 2019-04-09 | End: 2019-04-10 | Stop reason: HOSPADM

## 2019-04-09 RX ORDER — CLONIDINE HYDROCHLORIDE 0.1 MG/1
0.1 TABLET ORAL 3 TIMES DAILY PRN
Status: DISCONTINUED | OUTPATIENT
Start: 2019-04-09 | End: 2019-04-09

## 2019-04-09 RX ORDER — METHYLPREDNISOLONE SOD SUCC 125 MG
125 VIAL (EA) INJECTION
Status: COMPLETED | OUTPATIENT
Start: 2019-04-09 | End: 2019-04-09

## 2019-04-09 RX ADMIN — FLUOXETINE 20 MG: 20 CAPSULE ORAL at 08:04

## 2019-04-09 RX ADMIN — MORPHINE SULFATE 4 MG: 10 INJECTION INTRAVENOUS at 01:04

## 2019-04-09 RX ADMIN — CEFTRIAXONE SODIUM 2 G: 2 INJECTION, SOLUTION INTRAVENOUS at 01:04

## 2019-04-09 RX ADMIN — PROPRANOLOL HYDROCHLORIDE 60 MG: 40 TABLET ORAL at 08:04

## 2019-04-09 RX ADMIN — TRAMADOL HYDROCHLORIDE 50 MG: 50 TABLET, FILM COATED ORAL at 04:04

## 2019-04-09 RX ADMIN — METHYLPREDNISOLONE SODIUM SUCCINATE 125 MG: 125 INJECTION, POWDER, FOR SOLUTION INTRAMUSCULAR; INTRAVENOUS at 01:04

## 2019-04-09 RX ADMIN — ONDANSETRON 4 MG: 2 INJECTION INTRAMUSCULAR; INTRAVENOUS at 03:04

## 2019-04-09 RX ADMIN — SODIUM CHLORIDE: 0.9 INJECTION, SOLUTION INTRAVENOUS at 02:04

## 2019-04-09 RX ADMIN — SUMATRIPTAN SUCCINATE 6 MG: 6 INJECTION, SOLUTION SUBCUTANEOUS at 08:04

## 2019-04-09 RX ADMIN — VANCOMYCIN HYDROCHLORIDE 1500 MG: 1 INJECTION, POWDER, LYOPHILIZED, FOR SOLUTION INTRAVENOUS at 04:04

## 2019-04-09 RX ADMIN — ONDANSETRON 4 MG: 2 INJECTION INTRAMUSCULAR; INTRAVENOUS at 12:04

## 2019-04-09 RX ADMIN — ONDANSETRON 8 MG: 2 INJECTION INTRAMUSCULAR; INTRAVENOUS at 04:04

## 2019-04-09 RX ADMIN — MORPHINE SULFATE 1 MG: 10 INJECTION INTRAVENOUS at 08:04

## 2019-04-09 RX ADMIN — CEFTRIAXONE 2 G: 2 INJECTION, SOLUTION INTRAVENOUS at 12:04

## 2019-04-09 RX ADMIN — LABETALOL HYDROCHLORIDE 20 MG: 5 INJECTION INTRAVENOUS at 01:04

## 2019-04-09 RX ADMIN — LISINOPRIL 20 MG: 20 TABLET ORAL at 08:04

## 2019-04-09 RX ADMIN — TRAMADOL HYDROCHLORIDE 50 MG: 50 TABLET, FILM COATED ORAL at 12:04

## 2019-04-09 RX ADMIN — VANCOMYCIN HYDROCHLORIDE 1500 MG: 1 INJECTION, POWDER, LYOPHILIZED, FOR SOLUTION INTRAVENOUS at 02:04

## 2019-04-09 RX ADMIN — ACETAMINOPHEN 500 MG: 500 TABLET, FILM COATED ORAL at 05:04

## 2019-04-09 NOTE — PLAN OF CARE
"   04/09/19 1618   Discharge Assessment   Assessment Type Discharge Planning Assessment   Assessment information obtained from? Other   Prior to hospitilization cognitive status: Unable to Assess   Prior to hospitalization functional status: Independent   Current cognitive status: Unable to Assess   Current Functional Status: Independent   Lives With other relative(s)   Able to Return to Prior Arrangements yes   Is patient able to care for self after discharge? Yes   Who are your caregiver(s) and their phone number(s)? Jenny-grandmother    Patient's perception of discharge disposition home or selfcare   Readmission Within the Last 30 Days no previous admission in last 30 days   Patient currently being followed by outpatient case management? No   Patient currently receives any other outside agency services? No   Equipment Currently Used at Home none   Do you have any problems affording any of your prescribed medications? No   Is the patient taking medications as prescribed? yes   Does the patient have transportation home? Yes   Transportation Anticipated family or friend will provide   Does the patient receive services at the [facility]? No   Discharge Plan A Home   Discharge Plan B Home   Patient/Family in Agreement with Plan yes       TN spoke with patient's grandmother Jenny via phone complete discharge needs assessment. TN explained duties of case management to Jenny.  TN reviewed  "[facility]", "Discharge Planning Begins on Admission" and discussed "Help at Home". Jenny stated she would assist patient at home if needed.    "

## 2019-04-09 NOTE — CONSULTS
[facility]  Infectious Disease  Consult Note    Patient Name: Samantha Sr  MRN: [id number]  Admission Date: 4/8/2019  Hospital Length of Stay: 0 days  Attending Physician: Peng Bobby MD  Primary Care Provider: Kyle iRvera Jr, MD     Isolation Status: Droplet    Patient information was obtained from patient and past medical records.      Inpatient consult to Infectious Diseases  Consult performed by: FREYA Awad  Consult ordered by: Peng Bobby MD        Assessment/Plan:     Headache  34 year old female with PMH of HTN, obesity, depression, and tobacco abuse presents with headache x 4 days; pt states different than her usual migraine headaches in severity and length; denies fevers or chills; has associated neck tightness and dizziness. Pt also suffering from allergies.     Pt is afebrile. WBC- 14,000 (patient's baseline 11-12K upon chart review). Unable to perform LP in the ER; IR consulted for LP- scheduled for today. Pt started on empiric vanc/rocephin.     Plan:  1. Agree with LP to r/o meningitis though low suspicion for CNS infection at this time; please send for CSF cell count, protein, and glucose as well as CSF cultures  2. Continue empiric vanc/rocephin for now; will increase rocephin to 2 gram IV q 12 hours for CNS dosing;  if negative is LP, would d/c antibiotics  3. Will follow up after LP for further recs  4. Headache management per primary team    Thank you for your consult. I will follow-up with patient. Please contact us if you have any additional questions.  FREYA Braxton Pager: [phone]    Infectious Disease  [facility]    Subjective:     Principal Problem: Meningitis    HPI: This is a 35 y/o female with HTN, obesity, depression, and tobacco abuse who presents to [facility] ED with complaints of headache the past 4 days.  She reports that the headaches are localized to the bilateral temple area, is pressure-like in quality,  "and is 10/10 in severity at its worst.  She  states she had photophobia but that has resolved. She also reports neck soreness, as well as nausea and vomiting (now resolved). She has some associated dizziness.   She denies any rashes, fevers, chills, abdominal pain, nor diarrhea, and has not had any recent sick contacts nor recent travel.  She does report an episode about a week ago where she had intense nausea, vomiting, and diaphoresis with fever of 102.      She states headache is better since getting pain meds. Pt is afebrile. Leukocytosis of 14,000. Patient states usually her migraines last a few hours but this one has lasted longer than any previously. She is also suffering from seasonal allergies and has had nasal congestion, sneezing, and mild coughing.     The ER was unable to perform LP due to patient's body habitus. She was started on empiric vancomycin and ceftriaxone. IR has been consulted to perform LP today. ID is consulted for antibiotic recs.         Past Medical History:   Diagnosis Date    Anxiety     Bladder infection     Carpal tunnel syndrome     Depression     Hypertension     Migraine headache     Pancreatitis     UTI (lower urinary tract infection)        Past Surgical History:   Procedure Laterality Date    CHOLECYSTECTOMY      CHOLECYSTECTOMY, LAPAROSCOPIC N/A 12/21/2012    Performed [facility] at [facility]    JOINT REPLACEMENT      KNEE SURGERY   aug 2008    right knee    KNEE SURGERY      REDUCTION MAMMAPLASTY      Breast reduction       Review of patient's allergies indicates:   Allergen Reactions    Compazine [prochlorperazine edisylate] Anxiety    Fioricet [butalbital-acetaminophen-caff] Other (See Comments)     Reaction:  "Really bad yeast infections"    Reglan [metoclopramide] Rash       Medications:  Medications Prior to Admission   Medication Sig    alprazolam (XANAX) 0.5 MG tablet Take 0.5 mg by mouth as needed.     dextroamphetamine-amphetamine " (ADDERALL XR) 30 MG 24 hr capsule Take 30 mg by mouth every morning.    diphenhydrAMINE (BENADRYL) 25 mg capsule Take 1 each (25 mg total) by mouth every 6 (six) hours as needed for Itching or Allergies.    docusate sodium (COLACE) 100 MG capsule Take 1 capsule (100 mg total) by mouth 2 (two) times daily.    fenofibrate (TRICOR) 145 MG tablet Take 145 mg by mouth once daily.    fluoxetine (PROZAC) 20 MG capsule Take 20 mg by mouth once daily.    ketorolac (TORADOL) 10 mg tablet Take 1 tablet (10 mg total) by mouth every 6 (six) hours as needed for Pain.    levocetirizine (XYZAL) 5 MG tablet Take 1 tablet (5 mg total) by mouth every evening.    lisinopril 10 MG tablet Take 10 mg by mouth once daily.    ondansetron (ZOFRAN-ODT) 4 MG TbDL Take 1 tablet (4 mg total) by mouth every 6 (six) hours as needed (nausea).    propranolol (INDERAL) 60 MG tablet Take 60 mg by mouth 2 (two) times daily.    SPRINTEC, 28, 0.25-35 mg-mcg per tablet TAKE ONE TABLET BY MOUTH EVERY DAY     Antibiotics (From admission, onward)    Start     Stop Route Frequency Ordered    04/09/19 0200  vancomycin (VANCOCIN) 1,500 mg in dextrose 5 % 250 mL IVPB  (Vancomycin IVPB with levels panel)      -- IV Every 12 hours (non-standard times) 04/09/19 0108    04/09/19 0110  cefTRIAXone (ROCEPHIN) 2 g in dextrose 5 % 50 mL IVPB      -- IV Every 24 hours (non-standard times) 04/09/19 0104        Antifungals (From admission, onward)    None        Antivirals (From admission, onward)    None           Immunization History   Administered Date(s) Administered    Tdap 12/19/2018       Family History     Problem Relation (Age of Onset)    Hypertension Mother, Father, Maternal Grandmother        Social History     Socioeconomic History    Marital status: Single     Spouse name: Not on file    Number of children: Not on file    Years of education: Not on file    Highest education level: Not on file   Occupational History    Not on file   Social  Needs    Financial resource strain: Not on file    Food insecurity:     Worry: Not on file     Inability: Not on file    Transportation needs:     Medical: Not on file     Non-medical: Not on file   Tobacco Use    Smoking status: Former Smoker     Packs/day: 0.50     Years: 8.00     Pack years: 4.00     Types: Cigarettes     Last attempt to quit: 2018     Years since quittin.3    Smokeless tobacco: Never Used    Tobacco comment: Pt states she smokes 1 pack of cigarettes in 3 days.   Substance and Sexual Activity    Alcohol use: No    Drug use: No    Sexual activity: Yes     Partners: Male     Birth control/protection: OCP   Lifestyle    Physical activity:     Days per week: Not on file     Minutes per session: Not on file    Stress: Not on file   Relationships    Social connections:     Talks on phone: Not on file     Gets together: Not on file     Attends [employer] service: Not on file     Active member of club or organization: Not on file     Attends meetings of clubs or organizations: Not on file     Relationship status: Not on file   Other Topics Concern    Not on file   Social History Narrative    Not on file     Review of Systems   Constitutional: Positive for chills. Negative for fever.   Eyes: Positive for photophobia (resolved).   Respiratory: Negative for cough and shortness of breath.    Cardiovascular: Negative for chest pain and leg swelling.   Gastrointestinal: Positive for nausea and vomiting. Negative for abdominal pain, constipation and diarrhea.   Genitourinary: Negative for dysuria, frequency and hematuria.   Musculoskeletal: Positive for neck pain. Negative for back pain and myalgias.   Skin: Negative for rash and wound.   Neurological: Positive for dizziness and headaches. Negative for light-headedness.   Psychiatric/Behavioral: Negative for agitation and behavioral problems. The patient is not nervous/anxious.      Objective:     Vital Signs (Most Recent):  Temp: 98 °F  (36.7 °C) (04/09/19 0733)  Pulse: 102 (04/09/19 0733)  Resp: 18 (04/09/19 0733)  BP: (!) 154/86 (04/09/19 0733)  SpO2: 95 % (04/09/19 0745) Vital Signs (24h Range):  Temp:  [98 °F (36.7 °C)-98.8 °F (37.1 °C)] 98 °F (36.7 °C)  Pulse:  [] 102  Resp:  [16-20] 18  SpO2:  [95 %-98 %] 95 %  BP: (154-188)/() 154/86     Weight: 94.7 kg (208 lb 12.4 oz)  Body mass index is 38.19 kg/m².    Estimated Creatinine Clearance: 104.2 mL/min (based on SCr of 0.8 mg/dL).    Physical Exam   Constitutional: She is oriented to person, place, and time. She appears well-developed and well-nourished. No distress.   HENT:   Head: Normocephalic and atraumatic.   Mouth/Throat: Uvula is midline, oropharynx is clear and moist and mucous membranes are normal. No oral lesions.   Eyes: Pupils are equal, round, and reactive to light. Conjunctivae and EOM are normal. No scleral icterus.   Neck: Normal range of motion. Muscular tenderness (bilateral cervical muscles) present.   Cardiovascular: Normal rate, regular rhythm and normal heart sounds. Exam reveals no gallop and no friction rub.   No murmur heard.  Pulmonary/Chest: Effort normal and breath sounds normal. No respiratory distress. She has no wheezes. She has no rales.   Abdominal: Soft. Bowel sounds are normal. She exhibits no distension and no mass. There is no hepatosplenomegaly. There is no tenderness. There is no rebound and no guarding.   Musculoskeletal: She exhibits no edema.   Lymphadenopathy:        Head (right side): No submental, no submandibular and no tonsillar adenopathy present.        Head (left side): No submental, no submandibular and no tonsillar adenopathy present.     She has no cervical adenopathy.        Left: No supraclavicular adenopathy present.   Neurological: She is alert and oriented to person, place, and time. No cranial nerve deficit or sensory deficit.   Negative Kernig's sign.   No nuchal rigidity.   Skin: Skin is warm, dry and intact. No rash  noted.   Psychiatric: She has a normal mood and affect.       Significant Labs:   Blood Culture: No results for input(s): LABBLOO in the last 4320 hours.  CBC:   Recent Labs   Lab 04/08/19 2323 04/09/19  0433   WBC 14.15* 13.87*   HGB 13.5 13.6   HCT 39.8 40.4    284     CMP:   Recent Labs   Lab 04/08/19 2323 04/09/19 0433    135*   K 4.1 4.0    101   CO2 25 22*   * 198*   BUN 18 19   CREATININE 0.7 0.8   CALCIUM 10.6* 10.0   PROT 7.6  --    ALBUMIN 4.2  --    BILITOT 0.2  --    ALKPHOS 89  --    AST 28  --    ALT 37  --    ANIONGAP 10 12   EGFRNONAA >60 >60       Significant Imaging: I have reviewed all pertinent imaging results/findings within the past 24 hours.

## 2019-04-09 NOTE — ASSESSMENT & PLAN NOTE
Her presentation is very concerning for meningitis.  She has not had a fever but did have some nuchal rigidity.  CT-head was negative for acute intracranial abnormalities.  She was unable to undergo lumbar puncture in the ED and has been started on empiric therapy with vancomycin and ceftriaxone.  Will consult Interventional Radiology for lumbar puncture and place in droplet precaution.

## 2019-04-09 NOTE — NURSING
Placed call to IR requesting approx time for LP. Was told that procedure would be approx 3pm. Pt notified. Will cont to monitor.

## 2019-04-09 NOTE — HPI
Ms. Samantha Sr is a 34 y.o. female with essential hypertension, obesity (BMI 38.3), depression, and tobacco abuse who presents to [facility] ED with complaints of headache the past 3 days.  She reports that the headaches are localized to the bilateral temple area, is pressure-like in quality, and is 10/10 in severity at its worst.  She also reports some photophobia, neck stiffness, as well as nausea and vomiting of nonbloody but bilious fluid.  She denies any rashes, fevers, chills, abdominal pain, nor diarrhea, and has not had any recent sick contacts nor recent travel.  She does report an episode about a week ago where she had intense nausea, vomiting, and diaphoresis apparently out of nowhere.  She has never had similar symptoms in the past.  She has otherwise been in her usual state of health.

## 2019-04-09 NOTE — NURSING
Pt transferred to IR via bed. Complaints of HA. Will cont to monitor.     1648: IR called stating the LP will be pushed back to tomorrow due to no one being able to perform procedure. Will cont to monitor.

## 2019-04-09 NOTE — PLAN OF CARE
Problem: Adult Inpatient Plan of Care  Goal: Plan of Care Review  Outcome: Ongoing (interventions implemented as appropriate)  Pt free from falls, injury or any further trauma throughout shift. Pt AAOx4. Continued medications as ordered. IV antibiotics administered. Droplet isolation. VSS. Complaints of pain to head and neck during shift. Awaiting LP along with cultures, protein, glucose and cell count. Will cont to monitor.    04/09/19 1511   Plan of Care Review   Plan of Care Reviewed With patient

## 2019-04-09 NOTE — ASSESSMENT & PLAN NOTE
34 year old female with PMH of HTN, obesity, depression, and tobacco abuse presents with headache x 4 days; pt states different than her usual migraine headaches in severity and length; denies fevers or chills; has associated neck tightness and dizziness. Pt also suffering from allergies.     Pt is afebrile. WBC- 14,000 (patient's baseline 11-12K upon chart review). Unable to perform LP in the ER; IR consulted for LP- scheduled for today. Pt started on empiric vanc/rocephin.     Plan:  1. Agree with LP to r/o meningitis though low suspicion for CNS infection at this time; please send for CSF cell count, protein, and glucose as well as CSF cultures  2. Continue empiric vanc/rocephin for now; will increase rocephin to 2 gram IV q 12 hours for CNS dosing;  if negative is LP, would d/c antibiotics  3. Will follow up after LP for further recs  4. Headache management per primary team

## 2019-04-09 NOTE — SUBJECTIVE & OBJECTIVE
"Past Medical History:   Diagnosis Date    Anxiety     Bladder infection     Carpal tunnel syndrome     Depression     Hypertension     Migraine headache     Pancreatitis     UTI (lower urinary tract infection)        Past Surgical History:   Procedure Laterality Date    CHOLECYSTECTOMY      CHOLECYSTECTOMY, LAPAROSCOPIC N/A 12/21/2012    Performed [facility] at [facility]    JOINT REPLACEMENT      KNEE SURGERY   aug 2008    right knee    KNEE SURGERY      REDUCTION MAMMAPLASTY      Breast reduction       Review of patient's allergies indicates:   Allergen Reactions    Compazine [prochlorperazine edisylate] Anxiety    Fioricet [butalbital-acetaminophen-caff] Other (See Comments)     Reaction:  "Really bad yeast infections"    Reglan [metoclopramide] Rash       No current facility-administered medications on file prior to encounter.      Current Outpatient Medications on File Prior to Encounter   Medication Sig    alprazolam (XANAX) 0.5 MG tablet Take 0.5 mg by mouth as needed.     dextroamphetamine-amphetamine (ADDERALL XR) 30 MG 24 hr capsule Take 30 mg by mouth every morning.    diphenhydrAMINE (BENADRYL) 25 mg capsule Take 1 each (25 mg total) by mouth every 6 (six) hours as needed for Itching or Allergies.    docusate sodium (COLACE) 100 MG capsule Take 1 capsule (100 mg total) by mouth 2 (two) times daily.    fenofibrate (TRICOR) 145 MG tablet Take 145 mg by mouth once daily.    fluoxetine (PROZAC) 20 MG capsule Take 20 mg by mouth once daily.    ketorolac (TORADOL) 10 mg tablet Take 1 tablet (10 mg total) by mouth every 6 (six) hours as needed for Pain.    levocetirizine (XYZAL) 5 MG tablet Take 1 tablet (5 mg total) by mouth every evening.    lisinopril 10 MG tablet Take 10 mg by mouth once daily.    ondansetron (ZOFRAN-ODT) 4 MG TbDL Take 1 tablet (4 mg total) by mouth every 6 (six) hours as needed (nausea).    propranolol (INDERAL) 60 MG tablet Take 60 mg by mouth 2 (two) " times daily.    SPRINTEC, 28, 0.25-35 mg-mcg per tablet TAKE ONE TABLET BY MOUTH EVERY DAY     Family History     Problem Relation (Age of Onset)    Hypertension Mother, Father, Maternal Grandmother        Tobacco Use    Smoking status: Former Smoker     Packs/day: 0.50     Years: 8.00     Pack years: 4.00     Types: Cigarettes     Last attempt to quit: 2018     Years since quittin.3    Smokeless tobacco: Never Used    Tobacco comment: Pt states she smokes 1 pack of cigarettes in 3 days.   Substance and Sexual Activity    Alcohol use: No    Drug use: No    Sexual activity: Yes     Partners: Male     Birth control/protection: OCP     Review of Systems   Constitutional: Positive for diaphoresis. Negative for activity change, appetite change, chills, fatigue, fever and unexpected weight change.   HENT: Negative.    Eyes: Negative.    Respiratory: Negative for cough, chest tightness, shortness of breath and wheezing.    Cardiovascular: Negative for chest pain, palpitations and leg swelling.   Gastrointestinal: Positive for nausea and vomiting. Negative for abdominal distention, abdominal pain, blood in stool, constipation and diarrhea.   Genitourinary: Negative for dysuria and hematuria.   Musculoskeletal: Negative.    Skin: Negative.    Neurological: Positive for headaches. Negative for dizziness, tremors, seizures, syncope, facial asymmetry, speech difficulty, weakness, light-headedness and numbness.   Psychiatric/Behavioral: Negative.      Objective:     Vital Signs (Most Recent):  Temp: 98.2 °F (36.8 °C) (19)  Pulse: 96 (19)  Resp: 16 (19)  BP: (!) 154/92 (19)  SpO2: 95 % (19) Vital Signs (24h Range):  Temp:  [98.1 °F (36.7 °C)-98.8 °F (37.1 °C)] 98.2 °F (36.8 °C)  Pulse:  [] 96  Resp:  [16-20] 16  SpO2:  [95 %-98 %] 95 %  BP: (154-188)/() 154/92     Weight: 94.7 kg (208 lb 12.4 oz)  Body mass index is 38.19 kg/m².    Physical Exam    Constitutional: She is oriented to person, place, and time. She appears well-developed and well-nourished. No distress.   Obese   HENT:   Head: Normocephalic and atraumatic.   Right Ear: External ear normal.   Left Ear: External ear normal.   Nose: Nose normal.   Eyes: Right eye exhibits no discharge. Left eye exhibits no discharge.   Neck: Normal range of motion.   Cardiovascular: Normal rate, regular rhythm, normal heart sounds and intact distal pulses. Exam reveals no gallop and no friction rub.   No murmur heard.  Pulmonary/Chest: Effort normal and breath sounds normal. No stridor. No respiratory distress. She has no wheezes. She has no rales. She exhibits no tenderness.   Abdominal: Soft. Bowel sounds are normal. She exhibits no distension. There is no tenderness. There is no rebound and no guarding.   Neurological: She is alert and oriented to person, place, and time. She has normal strength. No cranial nerve deficit or sensory deficit. GCS eye subscore is 4. GCS verbal subscore is 5. GCS motor subscore is 6.   There is some nuchal rigidity present   Skin: Skin is warm and dry. She is not diaphoretic. No erythema.   Psychiatric: She has a normal mood and affect. Her behavior is normal. Judgment and thought content normal.   Nursing note and vitals reviewed.          Significant Labs: All pertinent labs within the past 24 hours have been reviewed.    Significant Imaging: I have reviewed and interpreted all pertinent imaging results/findings within the past 24 hours.

## 2019-04-09 NOTE — CARE UPDATE
Patient has been seen and examianted,agree with A/P of ,ordered CSF culture and cell analysis,consulted ID,doubt she has meningitis,she is afebrile,alert,non toxic appearing with no sign of neuro deficiency,.will follow up with CSF study and cell analysis.,

## 2019-04-09 NOTE — H&P
[facility] Medicine  History & Physical    Patient Name: Samantha Sr  MRN: [id number]  Admission Date: 4/8/2019  Attending Physician: Samantha Chowdhury MD   Primary Care Provider: Kyle Rivera Jr, MD         Patient information was obtained from patient.     Subjective:     Principal Problem:Meningitis    Chief Complaint: Headache for three days.    HPI: Ms. Samantha Sr is a 34 y.o. female with essential hypertension, obesity (BMI 38.3), depression, and tobacco abuse who presents to [facility] ED with complaints of headache the past 3 days.  She reports that the headaches are localized to the bilateral temple area, is pressure-like in quality, and is 10/10 in severity at its worst.  She also reports some photophobia, neck stiffness, as well as nausea and vomiting of nonbloody but bilious fluid.  She denies any rashes, fevers, chills, abdominal pain, nor diarrhea, and has not had any recent sick contacts nor recent travel.  She does report an episode about a week ago where she had intense nausea, vomiting, and diaphoresis apparently out of nowhere.  She has never had similar symptoms in the past.  She has otherwise been in her usual state of health.    Chart Review:  Previous Hospitalizations  Date Hospital Diagnosis   Dec 31, 2012 [facility] Pancreatitis after lap cholecystectomy; IR drainage of pseudocyst   Dec 15, 2012 [facility] Acute cholecystitis s/p laparoscopic cholecystectomy     Outpatient Follow-Up  Date of Visit Physician Service   Mar [facility]   Apr 2014 Naomi Joya MD Gynecology     Past Medical History:   Diagnosis Date    Anxiety     Bladder infection     Carpal tunnel syndrome     Depression     Hypertension     Migraine headache     Pancreatitis     UTI (lower urinary tract infection)        Past Surgical History:   Procedure Laterality Date    CHOLECYSTECTOMY      CHOLECYSTECTOMY, LAPAROSCOPIC N/A 12/21/2012    Performed by  "[facility]herty [facility] at [facility] OR    JOINT REPLACEMENT      KNEE SURGERY   aug 2008    right knee    KNEE SURGERY      REDUCTION MAMMAPLASTY      Breast reduction       Review of patient's allergies indicates:   Allergen Reactions    Compazine [prochlorperazine edisylate] Anxiety    Fioricet [butalbital-acetaminophen-caff] Other (See Comments)     Reaction:  "Really bad yeast infections"    Reglan [metoclopramide] Rash       No current facility-administered medications on file prior to encounter.      Current Outpatient Medications on File Prior to Encounter   Medication Sig    alprazolam (XANAX) 0.5 MG tablet Take 0.5 mg by mouth as needed.     dextroamphetamine-amphetamine (ADDERALL XR) 30 MG 24 hr capsule Take 30 mg by mouth every morning.    diphenhydrAMINE (BENADRYL) 25 mg capsule Take 1 each (25 mg total) by mouth every 6 (six) hours as needed for Itching or Allergies.    docusate sodium (COLACE) 100 MG capsule Take 1 capsule (100 mg total) by mouth 2 (two) times daily.    fenofibrate (TRICOR) 145 MG tablet Take 145 mg by mouth once daily.    fluoxetine (PROZAC) 20 MG capsule Take 20 mg by mouth once daily.    ketorolac (TORADOL) 10 mg tablet Take 1 tablet (10 mg total) by mouth every 6 (six) hours as needed for Pain.    levocetirizine (XYZAL) 5 MG tablet Take 1 tablet (5 mg total) by mouth every evening.    lisinopril 10 MG tablet Take 10 mg by mouth once daily.    ondansetron (ZOFRAN-ODT) 4 MG TbDL Take 1 tablet (4 mg total) by mouth every 6 (six) hours as needed (nausea).    propranolol (INDERAL) 60 MG tablet Take 60 mg by mouth 2 (two) times daily.    SPRINTEC, 28, 0.25-35 mg-mcg per tablet TAKE ONE TABLET BY MOUTH EVERY DAY     Family History     Problem Relation (Age of Onset)    Hypertension Mother, Father, Maternal Grandmother        Tobacco Use    Smoking status: Former Smoker     Packs/day: 0.50     Years: 8.00     Pack years: 4.00     Types: Cigarettes     Last attempt to quit: " 2018     Years since quittin.3    Smokeless tobacco: Never Used    Tobacco comment: Pt states she smokes 1 pack of cigarettes in 3 days.   Substance and Sexual Activity    Alcohol use: No    Drug use: No    Sexual activity: Yes     Partners: Male     Birth control/protection: OCP     Review of Systems   Constitutional: Positive for diaphoresis. Negative for activity change, appetite change, chills, fatigue, fever and unexpected weight change.   HENT: Negative.    Eyes: Negative.    Respiratory: Negative for cough, chest tightness, shortness of breath and wheezing.    Cardiovascular: Negative for chest pain, palpitations and leg swelling.   Gastrointestinal: Positive for nausea and vomiting. Negative for abdominal distention, abdominal pain, blood in stool, constipation and diarrhea.   Genitourinary: Negative for dysuria and hematuria.   Musculoskeletal: Negative.    Skin: Negative.    Neurological: Positive for headaches. Negative for dizziness, tremors, seizures, syncope, facial asymmetry, speech difficulty, weakness, light-headedness and numbness.   Psychiatric/Behavioral: Negative.      Objective:     Vital Signs (Most Recent):  Temp: 98.2 °F (36.8 °C) (19)  Pulse: 96 (19)  Resp: 16 (19)  BP: (!) 154/92 (19)  SpO2: 95 % (19) Vital Signs (24h Range):  Temp:  [98.1 °F (36.7 °C)-98.8 °F (37.1 °C)] 98.2 °F (36.8 °C)  Pulse:  [] 96  Resp:  [16-20] 16  SpO2:  [95 %-98 %] 95 %  BP: (154-188)/() 154/92     Weight: 94.7 kg (208 lb 12.4 oz)  Body mass index is 38.19 kg/m².    Physical Exam   Constitutional: She is oriented to person, place, and time. She appears well-developed and well-nourished. No distress.   Obese   HENT:   Head: Normocephalic and atraumatic.   Right Ear: External ear normal.   Left Ear: External ear normal.   Nose: Nose normal.   Eyes: Right eye exhibits no discharge. Left eye exhibits no discharge.   Neck: Normal range of  motion.   Cardiovascular: Normal rate, regular rhythm, normal heart sounds and intact distal pulses. Exam reveals no gallop and no friction rub.   No murmur heard.  Pulmonary/Chest: Effort normal and breath sounds normal. No stridor. No respiratory distress. She has no wheezes. She has no rales. She exhibits no tenderness.   Abdominal: Soft. Bowel sounds are normal. She exhibits no distension. There is no tenderness. There is no rebound and no guarding.   Neurological: She is alert and oriented to person, place, and time. She has normal strength. No cranial nerve deficit or sensory deficit. GCS eye subscore is 4. GCS verbal subscore is 5. GCS motor subscore is 6.   There is some nuchal rigidity present   Skin: Skin is warm and dry. She is not diaphoretic. No erythema.   Psychiatric: She has a normal mood and affect. Her behavior is normal. Judgment and thought content normal.   Nursing note and vitals reviewed.          Significant Labs: All pertinent labs within the past 24 hours have been reviewed.    Significant Imaging: I have reviewed and interpreted all pertinent imaging results/findings within the past 24 hours.    Assessment/Plan:     * Meningitis  Her presentation is very concerning for meningitis.  She has not had a fever but did have some nuchal rigidity.  CT-head was negative for acute intracranial abnormalities.  She was unable to undergo lumbar puncture in the ED and has been started on empiric therapy with vancomycin and ceftriaxone.  Will consult Interventional Radiology for lumbar puncture and place in droplet precaution.    Essential hypertension  Patient's blood pressure is poorly-controlled; will continue home regimen of lisinopril and propranolol, and provide as-needed clonidine.      Obesity, Class II, BMI 35-39.9  The patient has been counseled on the negative impact that obesity imparts on her health and was encouraged to make lifestyle changes in order to lose weight and decrease her  modifiable risk factors.    Depression  Stable; will continue her home regimen of fluoxetine.    Tobacco abuse  Patient was counseled on smoking cessation and she will be provided a nicotine transdermal patch applied while inpatient.  Will provide additional smoking cessation counseling prior to discharge.    VTE Risk Mitigation (From admission, onward)        Ordered     enoxaparin injection 40 mg  Daily      04/09/19 0440     IP VTE HIGH RISK PATIENT  Once      04/09/19 0440            Tyler Murillo M.D.  Staff Nocturnist  Department of Hospital Medicine  [facility]  Pager: [phone]          N.B.: Portions of this note was dictated using M*Modal Fluency Direct--there may be phonetic errors occasionally missed on review.

## 2019-04-09 NOTE — NURSING
MD notified of patients headache and unrelieved with PRN medication that was received down in ED. New orders received.

## 2019-04-09 NOTE — HPI
This is a 35 y/o female with HTN, obesity, depression, and tobacco abuse who presents to [facility] ED with complaints of headache the past 4 days.  She reports that the headaches are localized to the bilateral temple area, is pressure-like in quality, and is 10/10 in severity at its worst.  She states she had photophobia but that has resolved. She also reports neck soreness, as well as nausea and vomiting (now resolved). She has some associated dizziness.   She denies any rashes, fevers, chills, abdominal pain, nor diarrhea, and has not had any recent sick contacts nor recent travel.  She does report an episode about a week ago where she had intense nausea, vomiting, and diaphoresis with fever of 102.      She states headache is better since getting pain meds. Pt is afebrile. Leukocytosis of 14,000. Patient states usually her migraines last a few hours but this one has lasted longer than any previously. She is also suffering from seasonal allergies and has had nasal congestion, sneezing, and mild coughing.     The ER was unable to perform LP due to patient's body habitus. She was started on empiric vancomycin and ceftriaxone. IR has been consulted to perform LP today. ID is consulted for antibiotic recs.

## 2019-04-09 NOTE — NURSING
Patient arrived to unit via wheelchair with transport. Oriented to room. C/o headache. Assessment in progress

## 2019-04-09 NOTE — NURSING
MD notified of LP getting pushed back to tomorrow, and pain management not effective. New order given. Will cont to monitor.

## 2019-04-09 NOTE — NURSING
Patient AAOX4, c/o of headache pain 8/10. IVF infusing as ordered, IV antibiotic administered as ordered. Assessment completed. Patient ambulated to restroom independently. Bed in low locked position, bed alarm armed and audible with call light in reach. Will continue to monitor.

## 2019-04-09 NOTE — ED PROVIDER NOTES
"Encounter Date: 4/8/2019    SCRIBE #1 NOTE: I, Alfredito Beard, am scribing for, and in the presence of,  Dr. Noriega. I have scribed the following portions of the note - Other sections scribed: HPI, ROS, PE.       History     Chief Complaint   Patient presents with    Migraine     Patient presents to the ER with friend via personal vehicle. Patient presents with headache (mirgaine) x 3 days. Reports lightheadness    Hypertension     Patient blood pressure 177/120     Samantha Sr is a 34 y.o. Female with PMHx HTN who presents with migraine x 3 weeks, but gradually worsening in the past 3-4 days. Although she has suffered from migraines in the past, this occurrence is abnormal in that the onset is typically sudden and not as long in duration as the current episode. She complains of associated neck pain, photophobia, dizziness, nausea, and vomiting. She reports 3 episodes of vomiting yesterday, adding that her migraines are not typically accompanied  by nausea or vomiting. Per her significant other, the patient has been suffering from vomiting for 3 days.  She denies fever, chills,confusion, abdominal pain and vision changes. The patient normally uses Toradol for management of migraines, but it has not helped with pain today. Current medications include Lisinopril (QID), Prozac, and Propranolol (BID). The patient smokes 1 pack of cigarettes every 4-5 days. She is allergic to Compazine, stating that it causes severe anxiety. She is also allergic to Fioricet and Reglan, both of which cause hives. She denies sick contacts.    The history is provided by the patient and the spouse. No  was used.     Review of patient's allergies indicates:   Allergen Reactions    Compazine [prochlorperazine edisylate] Anxiety    Fioricet [butalbital-acetaminophen-caff] Other (See Comments)     Reaction:  "Really bad yeast infections"    Reglan [metoclopramide] Rash     Past Medical History:   Diagnosis Date    " Anxiety     Bladder infection     Carpal tunnel syndrome     Depression     Hypertension     Migraine headache     Pancreatitis     UTI (lower urinary tract infection)      Past Surgical History:   Procedure Laterality Date    CHOLECYSTECTOMY      CHOLECYSTECTOMY, LAPAROSCOPIC N/A 2012    Performed by [facility] at [facility]    JOINT REPLACEMENT      KNEE SURGERY   aug 2008    right knee    KNEE SURGERY      REDUCTION MAMMAPLASTY      Breast reduction     Family History   Problem Relation Age of Onset    Hypertension Mother     Hypertension Father     Hypertension Maternal Grandmother     Breast cancer Neg Hx     Colon cancer Neg Hx     Ovarian cancer Neg Hx      Social History     Tobacco Use    Smoking status: Former Smoker     Packs/day: 0.50     Years: 8.00     Pack years: 4.00     Types: Cigarettes     Last attempt to quit: 2018     Years since quittin.3    Smokeless tobacco: Never Used    Tobacco comment: Pt states she smokes 1 pack of cigarettes in 3 days.   Substance Use Topics    Alcohol use: No    Drug use: No     Review of Systems   Constitutional: Negative for chills and fever.   Eyes: Negative for visual disturbance.   Gastrointestinal: Positive for nausea and vomiting. Negative for abdominal pain.   Musculoskeletal: Positive for neck pain.   Neurological: Positive for dizziness and headaches (migraine).        Positive photophobia   Psychiatric/Behavioral: Negative for confusion.       Physical Exam     Initial Vitals [19 2224]   BP Pulse Resp Temp SpO2   (!) 177/120 (!) 118 18 98.8 °F (37.1 °C) 95 %      MAP       --         Physical Exam    Nursing note and vitals reviewed.  Constitutional: She appears well-developed and well-nourished. She is not diaphoretic.   HENT:   Head: Normocephalic and atraumatic.   Nose: Nose normal.   Eyes: Conjunctivae and EOM are normal. Pupils are equal, round, and reactive to light. No scleral icterus.   Neck: Normal  range of motion. Neck supple.   Cardiovascular: Normal rate, regular rhythm, normal heart sounds and intact distal pulses. Exam reveals no gallop and no friction rub.    No murmur heard.  Pulmonary/Chest: Effort normal. No stridor. No respiratory distress. She has no wheezes. She has no rhonchi. She has no rales.   Musculoskeletal: Normal range of motion. She exhibits edema.   Neurological: She is alert and oriented to person, place, and time. GCS score is 15. GCS eye subscore is 4. GCS verbal subscore is 5. GCS motor subscore is 6.   Skin: Skin is warm and dry. No rash noted.   Psychiatric: She has a normal mood and affect. Her behavior is normal.         ED Course   Procedures  Labs Reviewed   CBC W/ AUTO DIFFERENTIAL - Abnormal; Notable for the following components:       Result Value    WBC 14.15 (*)     MCH 31.5 (*)     MPV 8.8 (*)     Gran # (ANC) 8.1 (*)     Lymph # 4.9 (*)     All other components within normal limits   COMPREHENSIVE METABOLIC PANEL - Abnormal; Notable for the following components:    Glucose 125 (*)     Calcium 10.6 (*)     All other components within normal limits   VANCOMYCIN, TROUGH - Abnormal; Notable for the following components:    Vancomycin-Trough <1.1 (*)     All other components within normal limits    Narrative:     Collection Instructions if not specified above:->30 minutes  prior to 4th dose   RAPID HIV   POCT URINE PREGNANCY          Imaging Results          CT Head Without Contrast (Final result)  Result time 04/08/19 23:44:30    Final result by Kenn Rivers MD (04/08/19 23:44:30)                 Impression:      No CT evidence of acute intracranial abnormality. Clinical correlation and further evaluation as warranted.    Right paranasal sinus disease.      Electronically signed by: Kenn Rivers MD  Date:    04/08/2019  Time:    23:44             Narrative:    EXAMINATION:  CT HEAD WITHOUT CONTRAST    CLINICAL HISTORY:  persistent headache, neck pain,  stiffness;    TECHNIQUE:  Low dose axial images were obtained through the head.  Coronal and sagittal reformations were also performed. Contrast was not administered.    COMPARISON:  CT head 11/23/2018    FINDINGS:  There is no acute intracranial hemorrhage, hydrocephalus, midline shift or mass effect. Gray-white matter differentiation appears maintained. No extra-axial fluid collections identified.  There is a lobular mucous retention cyst or polyp within the right maxillary sinus.  The remaining paranasal sinuses and mastoid air cells appear relatively well aerated.  The calvarium is intact.                                 Medical Decision Making:   Initial Assessment:   34-year-old female with history of migraines and hypertension presenting with migraine like headache for 3 days.  Patient states this never persisted this long.  Associated with nausea vomiting and neck stiffness which is not normal for her.  Denies fevers or chills.  Denies head injury. Denies changes in vision.  On exam, patient in no acute distress.  Some neck pain stiffness when attempting to fully range her neck.  No neuro deficits.  Pupils equal bilaterally.  Some hypertension noted, 180/120.  Mild tachycardia low 100s.  Differential includes migraine headache, less likely subarachnoid hemorrhage.  We will get labs, urine, head CT, attempt to treat symptomatically and reassess.   Clinical Tests:   Lab Tests: Ordered and Reviewed  Radiological Study: Reviewed and Ordered  ED Management:  Patient with persistent nausea and vomiting. Evaluated for potential lumbar puncture given tachycardia, white count, neck pain and stiffness, persistent nausea vomiting with severe headache. Cannot rule out meningitis.  Patient body habitus is obese.  I am unable to palpate any definite bony landmarks.  Lumbar puncture would be extraordinarily difficult potentially unsafe given inability to localize spine.  I do not feel comfortable nor do I think a attempt  would be successful.  Patient will be admitted insert on empiric antibiotics cover meningitis.  We will treat her for intractable nausea and vomiting, potential meningitis, intractable headache. No relief with Toradol, sumatriptan, Solu-Medrol, multiple rounds of zofran and phenergan.            Scribe Attestation:   Scribe #1: I performed the above scribed service and the documentation accurately describes the services I performed. I attest to the accuracy of the note.               Clinical Impression:     1. Acute intractable headache, unspecified headache type    2. Intractable vomiting with nausea, unspecified vomiting type    3. Meningitis                                   Timi Noriega MD  04/09/19 0320

## 2019-04-09 NOTE — CONSULTS
Inpatient Radiology Pre-procedure Note    History of Present Illness:  Samantha Sr is a 34 y.o. female with PMHx of HTN who presents with gradually worseing migraine x 3 weeks associated with neck pain, photophobia, dizziness, nausea and vomiting. Denies fever, chills,confusion, abdominal pain and vision changes.     Given presenting signs/symptoms, there are concerns for CNS infection and a new inpatient IR consult was placed to evaluate for image-guided LP for diagnostic and treatment planning purposes.      Admission H&P reviewed.  Past Medical History:   Diagnosis Date    Anxiety     Bladder infection     Carpal tunnel syndrome     Depression     Hypertension     Migraine headache     Pancreatitis     UTI (lower urinary tract infection)      Past Surgical History:   Procedure Laterality Date    CHOLECYSTECTOMY      CHOLECYSTECTOMY, LAPAROSCOPIC N/A 12/21/2012    Performed by Zeferino Daugherty MD at [facility] OR    JOINT REPLACEMENT      KNEE SURGERY   aug 2008    right knee    KNEE SURGERY      REDUCTION MAMMAPLASTY      Breast reduction       Review of Systems:   Constitutional: Negative for chills and fever.   Eyes: Negative for visual disturbance.   Gastrointestinal: Positive for nausea and vomiting. Negative for abdominal pain.   Musculoskeletal: Positive for neck pain.   Neurological: Positive for dizziness and headaches (migraine). Positive photophobia   Psychiatric/Behavioral: Negative for confusion.      Home Meds:   Prior to Admission medications    Medication Sig Start Date End Date Taking? Authorizing Provider   alprazolam (XANAX) 0.5 MG tablet Take 0.5 mg by mouth as needed.     Historical Provider, MD   dextroamphetamine-amphetamine (ADDERALL XR) 30 MG 24 hr capsule Take 30 mg by mouth every morning.    Historical Provider, MD   diphenhydrAMINE (BENADRYL) 25 mg capsule Take 1 each (25 mg total) by mouth every 6 (six) hours as needed for Itching or Allergies. 3/5/18   Maurice Ng PA-C  "  docusate sodium (COLACE) 100 MG capsule Take 1 capsule (100 mg total) by mouth 2 (two) times daily. 2/23/19   Wesley Capellan MD   fenofibrate (TRICOR) 145 MG tablet Take 145 mg by mouth once daily.    Historical Provider, MD   fluoxetine (PROZAC) 20 MG capsule Take 20 mg by mouth once daily.    Historical Provider, MD   ketorolac (TORADOL) 10 mg tablet Take 1 tablet (10 mg total) by mouth every 6 (six) hours as needed for Pain. 2/12/19   Samantha Dove PA-C   levocetirizine (XYZAL) 5 MG tablet Take 1 tablet (5 mg total) by mouth every evening. 3/8/19 3/7/20  Angelica Montano PA-C   lisinopril 10 MG tablet Take 10 mg by mouth once daily.    Historical Provider, MD   ondansetron (ZOFRAN-ODT) 4 MG TbDL Take 1 tablet (4 mg total) by mouth every 6 (six) hours as needed (nausea). 2/12/19   Samantha Dove PA-C   propranolol (INDERAL) 60 MG tablet Take 60 mg by mouth 2 (two) times daily.    Historical Provider, MD   SPRINTEC, 28, 0.25-35 mg-mcg per tablet TAKE ONE TABLET BY MOUTH EVERY DAY 10/15/14   Rock Barbour MD     Scheduled Meds:    cefTRIAXone (ROCEPHIN) IVPB  2 g Intravenous Q24H    enoxaparin  40 mg Subcutaneous Daily    FLUoxetine  20 mg Oral Daily    lisinopril  20 mg Oral Daily    nicotine  1 patch Transdermal Daily    propranolol  60 mg Oral BID    vancomycin (VANCOCIN) IVPB  1,500 mg Intravenous Q12H     Continuous Infusions:   PRN Meds:acetaminophen, cloNIDine, ondansetron, promethazine (PHENERGAN) IVPB, ramelteon, senna-docusate 8.6-50 mg, traMADol  Anticoagulants/Antiplatelets: no anticoagulation    Allergies:   Review of patient's allergies indicates:   Allergen Reactions    Compazine [prochlorperazine edisylate] Anxiety    Fioricet [butalbital-acetaminophen-caff] Other (See Comments)     Reaction:  "Really bad yeast infections"    Reglan [metoclopramide] Rash     Sedation Hx: have not been any systemic reactions    Labs:  No results for input(s): INR in the last 168 " hours.    Invalid input(s):  PT,  PTT    Recent Labs   Lab 04/09/19 0433   WBC 13.87*   HGB 13.6   HCT 40.4   MCV 94         Recent Labs   Lab 04/08/19  2323 04/09/19 0433   * 198*    135*   K 4.1 4.0    101   CO2 25 22*   BUN 18 19   CREATININE 0.7 0.8   CALCIUM 10.6* 10.0   ALT 37  --    AST 28  --    ALBUMIN 4.2  --    BILITOT 0.2  --          Vitals:  Temp: 98 °F (36.7 °C) (04/09/19 0733)  Pulse: 102 (04/09/19 0733)  Resp: 18 (04/09/19 0733)  BP: (!) 154/86 (04/09/19 0733)  SpO2: 95 % (04/09/19 0745)     Physical Exam:  ASA: II  Mallampati: IIII    Constitutional: She appears well-developed and well-nourished. She is not diaphoretic.   HENT:   Head: Normocephalic and atraumatic.   Nose: Nose normal.   Eyes: Conjunctivae and EOM are normal. Pupils are equal, round, and reactive to light. No scleral icterus.   Neck: Normal range of motion. Neck supple.   Cardiovascular: Normal rate, regular rhythm, normal heart sounds and intact distal pulses. Exam reveals no gallop and no friction rub.    No murmur heard.  Pulmonary/Chest: Effort normal. No stridor. No respiratory distress. She has no wheezes. She has no rhonchi. She has no rales.   Musculoskeletal: Normal range of motion. She exhibits edema.   Neurological: She is alert and oriented to person, place, and time. GCS score is 15. GCS eye subscore is 4. GCS verbal subscore is 5. GCS motor subscore is 6.   Skin: Skin is warm and dry. No rash noted.   Psychiatric: She has a normal mood and affect. Her behavior is normal.       A/P:   35 y/o F with worsening migraines x 3 weeks associated with neck pain, photophobia, dizziness, nausea and emesis concerning for CNS infection.    1. Will attempt fluoroscopic-guided LP with local anesthetic only. Hold all anti-coagulants today.    Thank you for considering IR for the care of your patient.     Gil Ogden MD  Interventional Radiology    Addendum:  Request for transport to transfer pt to IR  placed at 1550. It is now 1645 and pt has yet to arrive in Rad dept. Will post-pone till tomorrow AM and inquire about the delay with transport dept.

## 2019-04-09 NOTE — SUBJECTIVE & OBJECTIVE
"Past Medical History:   Diagnosis Date    Anxiety     Bladder infection     Carpal tunnel syndrome     Depression     Hypertension     Migraine headache     Pancreatitis     UTI (lower urinary tract infection)        Past Surgical History:   Procedure Laterality Date    CHOLECYSTECTOMY      CHOLECYSTECTOMY, LAPAROSCOPIC N/A 12/21/2012    Performed by [facility]aug[facility] at [facility] OR    JOINT REPLACEMENT      KNEE SURGERY   aug 2008    right knee    KNEE SURGERY      REDUCTION MAMMAPLASTY      Breast reduction       Review of patient's allergies indicates:   Allergen Reactions    Compazine [prochlorperazine edisylate] Anxiety    Fioricet [butalbital-acetaminophen-caff] Other (See Comments)     Reaction:  "Really bad yeast infections"    Reglan [metoclopramide] Rash       Medications:  Medications Prior to Admission   Medication Sig    alprazolam (XANAX) 0.5 MG tablet Take 0.5 mg by mouth as needed.     dextroamphetamine-amphetamine (ADDERALL XR) 30 MG 24 hr capsule Take 30 mg by mouth every morning.    diphenhydrAMINE (BENADRYL) 25 mg capsule Take 1 each (25 mg total) by mouth every 6 (six) hours as needed for Itching or Allergies.    docusate sodium (COLACE) 100 MG capsule Take 1 capsule (100 mg total) by mouth 2 (two) times daily.    fenofibrate (TRICOR) 145 MG tablet Take 145 mg by mouth once daily.    fluoxetine (PROZAC) 20 MG capsule Take 20 mg by mouth once daily.    ketorolac (TORADOL) 10 mg tablet Take 1 tablet (10 mg total) by mouth every 6 (six) hours as needed for Pain.    levocetirizine (XYZAL) 5 MG tablet Take 1 tablet (5 mg total) by mouth every evening.    lisinopril 10 MG tablet Take 10 mg by mouth once daily.    ondansetron (ZOFRAN-ODT) 4 MG TbDL Take 1 tablet (4 mg total) by mouth every 6 (six) hours as needed (nausea).    propranolol (INDERAL) 60 MG tablet Take 60 mg by mouth 2 (two) times daily.    SPRINTEC, 28, 0.25-35 mg-mcg per tablet TAKE ONE TABLET BY MOUTH EVERY " DAY     Antibiotics (From admission, onward)    Start     Stop Route Frequency Ordered    19 0200  vancomycin (VANCOCIN) 1,500 mg in dextrose 5 % 250 mL IVPB  (Vancomycin IVPB with levels panel)      -- IV Every 12 hours (non-standard times) 19 0108    19 0110  cefTRIAXone (ROCEPHIN) 2 g in dextrose 5 % 50 mL IVPB      -- IV Every 24 hours (non-standard times) 19 0104        Antifungals (From admission, onward)    None        Antivirals (From admission, onward)    None           Immunization History   Administered Date(s) Administered    Tdap 2018       Family History     Problem Relation (Age of Onset)    Hypertension Mother, Father, Maternal Grandmother        Social History     Socioeconomic History    Marital status: Single     Spouse name: Not on file    Number of children: Not on file    Years of education: Not on file    Highest education level: Not on file   Occupational History    Not on file   Social Needs    Financial resource strain: Not on file    Food insecurity:     Worry: Not on file     Inability: Not on file    Transportation needs:     Medical: Not on file     Non-medical: Not on file   Tobacco Use    Smoking status: Former Smoker     Packs/day: 0.50     Years: 8.00     Pack years: 4.00     Types: Cigarettes     Last attempt to quit: 2018     Years since quittin.3    Smokeless tobacco: Never Used    Tobacco comment: Pt states she smokes 1 pack of cigarettes in 3 days.   Substance and Sexual Activity    Alcohol use: No    Drug use: No    Sexual activity: Yes     Partners: Male     Birth control/protection: OCP   Lifestyle    Physical activity:     Days per week: Not on file     Minutes per session: Not on file    Stress: Not on file   Relationships    Social connections:     Talks on phone: Not on file     Gets together: Not on file     Attends Oriental orthodox service: Not on file     Active member of club or organization: Not on file     Attends  meetings of clubs or organizations: Not on file     Relationship status: Not on file   Other Topics Concern    Not on file   Social History Narrative    Not on file     Review of Systems   Constitutional: Positive for chills. Negative for fever.   Eyes: Positive for photophobia (resolved).   Respiratory: Negative for cough and shortness of breath.    Cardiovascular: Negative for chest pain and leg swelling.   Gastrointestinal: Positive for nausea and vomiting. Negative for abdominal pain, constipation and diarrhea.   Genitourinary: Negative for dysuria, frequency and hematuria.   Musculoskeletal: Positive for neck pain. Negative for back pain and myalgias.   Skin: Negative for rash and wound.   Neurological: Positive for dizziness and headaches. Negative for light-headedness.   Psychiatric/Behavioral: Negative for agitation and behavioral problems. The patient is not nervous/anxious.      Objective:     Vital Signs (Most Recent):  Temp: 98 °F (36.7 °C) (04/09/19 0733)  Pulse: 102 (04/09/19 0733)  Resp: 18 (04/09/19 0733)  BP: (!) 154/86 (04/09/19 0733)  SpO2: 95 % (04/09/19 0745) Vital Signs (24h Range):  Temp:  [98 °F (36.7 °C)-98.8 °F (37.1 °C)] 98 °F (36.7 °C)  Pulse:  [] 102  Resp:  [16-20] 18  SpO2:  [95 %-98 %] 95 %  BP: (154-188)/() 154/86     Weight: 94.7 kg (208 lb 12.4 oz)  Body mass index is 38.19 kg/m².    Estimated Creatinine Clearance: 104.2 mL/min (based on SCr of 0.8 mg/dL).    Physical Exam   Constitutional: She is oriented to person, place, and time. She appears well-developed and well-nourished. No distress.   HENT:   Head: Normocephalic and atraumatic.   Mouth/Throat: Uvula is midline, oropharynx is clear and moist and mucous membranes are normal. No oral lesions.   Eyes: Pupils are equal, round, and reactive to light. Conjunctivae and EOM are normal. No scleral icterus.   Neck: Normal range of motion. Muscular tenderness (bilateral cervical muscles) present.   Cardiovascular:  Normal rate, regular rhythm and normal heart sounds. Exam reveals no gallop and no friction rub.   No murmur heard.  Pulmonary/Chest: Effort normal and breath sounds normal. No respiratory distress. She has no wheezes. She has no rales.   Abdominal: Soft. Bowel sounds are normal. She exhibits no distension and no mass. There is no hepatosplenomegaly. There is no tenderness. There is no rebound and no guarding.   Musculoskeletal: She exhibits no edema.   Lymphadenopathy:        Head (right side): No submental, no submandibular and no tonsillar adenopathy present.        Head (left side): No submental, no submandibular and no tonsillar adenopathy present.     She has no cervical adenopathy.        Left: No supraclavicular adenopathy present.   Neurological: She is alert and oriented to person, place, and time. No cranial nerve deficit or sensory deficit.   Negative Kernig's sign.   No nuchal rigidity.   Skin: Skin is warm, dry and intact. No rash noted.   Psychiatric: She has a normal mood and affect.       Significant Labs:   Blood Culture: No results for input(s): LABBLOO in the last 4320 hours.  CBC:   Recent Labs   Lab 04/08/19  2323 04/09/19  0433   WBC 14.15* 13.87*   HGB 13.5 13.6   HCT 39.8 40.4    284     CMP:   Recent Labs   Lab 04/08/19  2323 04/09/19  0433    135*   K 4.1 4.0    101   CO2 25 22*   * 198*   BUN 18 19   CREATININE 0.7 0.8   CALCIUM 10.6* 10.0   PROT 7.6  --    ALBUMIN 4.2  --    BILITOT 0.2  --    ALKPHOS 89  --    AST 28  --    ALT 37  --    ANIONGAP 10 12   EGFRNONAA >60 >60       Significant Imaging: I have reviewed all pertinent imaging results/findings within the past 24 hours.

## 2019-04-09 NOTE — MEDICAL/APP STUDENT
[facility]  History & Physical    Subjective:      Chief Complaint/Reason for Admission: Meningitis    Samantha Sr is a 34 y.o. female who presented to [facility] ED with migraine x3 weeks that has gradually worsened over the last 3-4 days. She has recurrent migraines but this episode was insidious in nature and longer than usual. She has neck pain, photophobia, dizziness, nausea and vomitting. She vomitted 3x yesterday. She denies fever, chills, confusion, abdominal pain or visiion changes. She takes Toradol for migraine management but this has not helped. She smokes 1 pack of cigareets every 4-5 days. She is allergic to compazine (anxiety) and Fioricet and Reglan (hives). She has had no sick contacts, has not travelled internationally.    Past Medical History:   Diagnosis Date    Anxiety     Bladder infection     Carpal tunnel syndrome     Depression     Hypertension     Migraine headache     Pancreatitis     UTI (lower urinary tract infection)      Past Surgical History:   Procedure Laterality Date    CHOLECYSTECTOMY      CHOLECYSTECTOMY, LAPAROSCOPIC N/A 2012    Performed by [facility] at [facility]    JOINT REPLACEMENT      KNEE SURGERY   aug 2008    right knee    KNEE SURGERY      REDUCTION MAMMAPLASTY      Breast reduction     Family History   Problem Relation Age of Onset    Hypertension Mother     Hypertension Father     Hypertension Maternal Grandmother     Breast cancer Neg Hx     Colon cancer Neg Hx     Ovarian cancer Neg Hx      Social History     Tobacco Use    Smoking status: Former Smoker     Packs/day: 0.50     Years: 8.00     Pack years: 4.00     Types: Cigarettes     Last attempt to quit: 2018     Years since quittin.3    Smokeless tobacco: Never Used    Tobacco comment: Pt states she smokes 1 pack of cigarettes in 3 days.   Substance Use Topics    Alcohol use: No    Drug use: No         (Not in a hospital admission)  Review of  "patient's allergies indicates:   Allergen Reactions    Compazine [prochlorperazine edisylate] Anxiety    Fioricet [butalbital-acetaminophen-caff] Other (See Comments)     Reaction:  "Really bad yeast infections"    Reglan [metoclopramide] Rash        Review of Systems   Constitutional: Positive for malaise/fatigue. Negative for chills, diaphoresis, fever and weight loss.   HENT: Positive for congestion. Negative for ear discharge, ear pain, hearing loss, nosebleeds, sinus pain, sore throat and tinnitus.    Eyes: Positive for photophobia. Negative for blurred vision, double vision, pain, discharge and redness.   Respiratory: Negative for cough, hemoptysis, sputum production, shortness of breath, wheezing and stridor.    Cardiovascular: Positive for leg swelling. Negative for chest pain, palpitations, orthopnea and claudication.   Gastrointestinal: Positive for nausea and vomiting. Negative for abdominal pain, blood in stool, constipation, diarrhea, heartburn and melena.   Genitourinary: Negative.    Musculoskeletal: Positive for neck pain. Negative for back pain, falls, joint pain and myalgias.   Skin: Negative for itching and rash.   Neurological: Positive for dizziness and headaches. Negative for tingling, tremors, sensory change, speech change, focal weakness, seizures, loss of consciousness and weakness.   Endo/Heme/Allergies: Negative.    Psychiatric/Behavioral: Negative for depression, hallucinations, memory loss, substance abuse and suicidal ideas. The patient has insomnia. The patient is not nervous/anxious.        Objective:      Vital Signs (Most Recent)  Temp: 98.8 °F (37.1 °C) (04/08/19 2224)  Pulse: (!) 118 (04/08/19 2224)  Resp: 18 (04/08/19 2224)  BP: (!) 177/120 (04/08/19 2224)  SpO2: 95 % (04/08/19 2224)    Vital Signs Range (Last 24H):  Temp:  [98.8 °F (37.1 °C)]   Pulse:  [118]   Resp:  [18]   BP: (177)/(120)   SpO2:  [95 %]     Physical Exam   Constitutional: She is oriented to person, place, " and time. Vital signs are normal. She appears well-developed and well-nourished. She is active. She appears distressed.   Central obesity   HENT:   Head: Normocephalic and atraumatic.   Eyes: Pupils are equal, round, and reactive to light. Conjunctivae and EOM are normal.   Neck: Decreased range of motion present.   Cardiovascular: Normal rate, regular rhythm and normal heart sounds. Exam reveals no gallop and no friction rub.   No murmur heard.  Pulmonary/Chest: Effort normal and breath sounds normal. No respiratory distress.   Abdominal: Soft. Bowel sounds are normal. She exhibits no distension. There is no tenderness. There is no guarding.   Musculoskeletal: She exhibits edema. She exhibits no deformity.   Neurological: She is alert and oriented to person, place, and time.   Skin: Skin is warm and dry. Capillary refill takes less than 2 seconds.   Psychiatric: She has a normal mood and affect. Her behavior is normal. Judgment and thought content normal.   Nursing note and vitals reviewed.      Data Review:    CBC:   Lab Results   Component Value Date    WBC 14.15 (H) 04/08/2019    RBC 4.28 04/08/2019    HGB 13.5 04/08/2019    HCT 39.8 04/08/2019     04/08/2019     BMP:   Lab Results   Component Value Date     (H) 04/08/2019     04/08/2019    K 4.1 04/08/2019     04/08/2019    CO2 25 04/08/2019    BUN 18 04/08/2019    CREATININE 0.7 04/08/2019    CALCIUM 10.6 (H) 04/08/2019     ABGs:   Lab Results   Component Value Date    PH 7.423 12/18/2012    PO2 59 (LL) 12/18/2012    PCO2 41.9 12/18/2012      ECG: normal sinus rhythm, no blocks or conduction defects, no ischemic changes.     Ct Head Without Contrast    Result Date: 4/8/2019  No CT evidence of acute intracranial abnormality. Clinical correlation and further evaluation as warranted. Right paranasal sinus disease. There is no acute intracranial hemorrhage, hydrocephalus, midline shift or mass effect. Gray-white matter differentiation  appears maintained. No extra-axial fluid collections identified.  There is a lobular mucous retention cyst or polyp within the right maxillary sinus.  The remaining paranasal sinuses and mastoid air cells appear relatively well aerated.  The calvarium is intact. Electronically signed by: Kenn Rivers MD Date:    04/08/2019 Time:    23:44    Assessment:      Active Hospital Problems    Diagnosis  POA    Acute intractable headache [R51]  Yes      Resolved Hospital Problems   No resolved problems to display.   *Meningism  Meningism is inflammation of the meningeal membranes. It can be associated with infectious causes or medications, SLE, sarcoidosis, carcinomatosis, among others. The pathophysiology is through hematogenous spread, retrograde transport via nerves, or contiguous spread from maxillofacial infection. Meningitis can be further classified as acute,with an onset of hours or days, and chronic meningitis, onset over weeks and months. Furthermore, the disease can be bacterial versus aseptic.  In adults, the most common cause of acute bacterial meningitis is s.pneumoniae, n.meningitidis, and h.influenzae. Complications include seizures, coma, abscess, DIC and respiratory arrest. Permanent sequelae include deafness and brain damage.  Aseptic meningitis can be caused by enteroviruses or HSV, among other bacteria, parasites and fungi. It is difficult to distinguish from bacterial.   This 34 year old patient has many of the key symptoms of meningitis and signs-nuchal rigidity. She does not have Kernig or Brudzinki signs.  Diagnosis is through LP. Bacterial shows an elevated WBC, low glucose, and high protein. Aseptic shows an increase in mononuclear cells, or the CSF may be normal.  Treatment  -IV ceftriaxone and vancomycin  -Analgesics prn  -IR consult for LP tomorrow    Ddx-migraine    Dispo: IR for LP tomorrow, continue empiric antibiotics.    Plan:

## 2019-04-09 NOTE — ED PROVIDER NOTES
"Encounter Date: 2019       History     Chief Complaint   Patient presents with    Migraine     Patient presents to the ER with friend via personal vehicle. Patient presents with headache (mirgaine) x 3 days. Reports lightheadness    Hypertension     Patient blood pressure 177/120     HPI  Review of patient's allergies indicates:   Allergen Reactions    Compazine [prochlorperazine edisylate] Anxiety    Fioricet [butalbital-acetaminophen-caff] Other (See Comments)     Reaction:  "Really bad yeast infections"    Reglan [metoclopramide] Rash     Past Medical History:   Diagnosis Date    Anxiety     Bladder infection     Carpal tunnel syndrome     Depression     Hypertension     Migraine headache     Pancreatitis     UTI (lower urinary tract infection)      Past Surgical History:   Procedure Laterality Date    CHOLECYSTECTOMY      CHOLECYSTECTOMY, LAPAROSCOPIC N/A 2012    Performed by Zeferino Daugherty [facility] at [facility] OR    JOINT REPLACEMENT      KNEE SURGERY   aug 2008    right knee    KNEE SURGERY      REDUCTION MAMMAPLASTY      Breast reduction     Family History   Problem Relation Age of Onset    Hypertension Mother     Hypertension Father     Hypertension Maternal Grandmother     Breast cancer Neg Hx     Colon cancer Neg Hx     Ovarian cancer Neg Hx      Social History     Tobacco Use    Smoking status: Former Smoker     Packs/day: 0.50     Years: 8.00     Pack years: 4.00     Types: Cigarettes     Last attempt to quit: 2018     Years since quittin.3    Smokeless tobacco: Never Used    Tobacco comment: Pt states she smokes 1 pack of cigarettes in 3 days.   Substance Use Topics    Alcohol use: No    Drug use: No     Review of Systems    Physical Exam     Initial Vitals [19 2224]   BP Pulse Resp Temp SpO2   (!) 177/120 (!) 118 18 98.8 °F (37.1 °C) 95 %      MAP       --         Physical Exam    ED Course   Procedures  Labs Reviewed   POCT URINE PREGNANCY          Imaging " Results    None          Medical Decision Making:   Initial Assessment:   34-year-old female with history of migraines and hypertension presenting with migraine like headache for 3 days.  Patient states this never persisted this long.  Associated with nausea vomiting and neck stiffness which is not normal for her.  Denies fevers or chills.  Denies head injury. Denies changes in vision.  On exam, patient in no acute distress.  Some neck pain stiffness when attempting to fully range her neck.  No neuro deficits.  Pupils equal bilaterally.  Some hypertension noted, 180/120.  Mild tachycardia low 100s.  Differential includes migraine headache, less likely subarachnoid hemorrhage.  We will get labs, urine, head CT, attempt to treat symptomatically and reassess.  No active nausea or vomiting in the emergency department.                      Clinical Impression:   {Add your Clinical Impression here. If you haven't documented one yet, please pend the note, finalize a Clinical Impression, and refresh your note before signing.:[id number]}

## 2019-04-09 NOTE — ASSESSMENT & PLAN NOTE
Patient's blood pressure is poorly-controlled; will continue home regimen of lisinopril and propranolol, and provide as-needed clonidine.

## 2019-04-10 VITALS
SYSTOLIC BLOOD PRESSURE: 133 MMHG | BODY MASS INDEX: 40.69 KG/M2 | RESPIRATION RATE: 17 BRPM | HEIGHT: 62 IN | TEMPERATURE: 98 F | HEART RATE: 86 BPM | WEIGHT: 221.13 LBS | DIASTOLIC BLOOD PRESSURE: 88 MMHG | OXYGEN SATURATION: 94 %

## 2019-04-10 LAB
ALBUMIN SERPL BCP-MCNC: 3.6 G/DL (ref 3.5–5.2)
ALP SERPL-CCNC: 75 U/L (ref 55–135)
ALT SERPL W/O P-5'-P-CCNC: 34 U/L (ref 10–44)
ANION GAP SERPL CALC-SCNC: 9 MMOL/L (ref 8–16)
AST SERPL-CCNC: 22 U/L (ref 10–40)
BASOPHILS # BLD AUTO: 0.03 K/UL (ref 0–0.2)
BASOPHILS NFR BLD: 0.1 % (ref 0–1.9)
BILIRUB SERPL-MCNC: 0.2 MG/DL (ref 0.1–1)
BUN SERPL-MCNC: 23 MG/DL (ref 6–20)
CALCIUM SERPL-MCNC: 9.2 MG/DL (ref 8.7–10.5)
CHLORIDE SERPL-SCNC: 103 MMOL/L (ref 95–110)
CLARITY CSF: CLEAR
CO2 SERPL-SCNC: 23 MMOL/L (ref 23–29)
COLOR CSF: COLORLESS
CREAT SERPL-MCNC: 0.8 MG/DL (ref 0.5–1.4)
DIFFERENTIAL METHOD: ABNORMAL
EOSINOPHIL # BLD AUTO: 0 K/UL (ref 0–0.5)
EOSINOPHIL NFR BLD: 0.1 % (ref 0–8)
ERYTHROCYTE [DISTWIDTH] IN BLOOD BY AUTOMATED COUNT: 13.5 % (ref 11.5–14.5)
EST. GFR  (AFRICAN AMERICAN): >60 ML/MIN/1.73 M^2
EST. GFR  (NON AFRICAN AMERICAN): >60 ML/MIN/1.73 M^2
GLUCOSE CSF-MCNC: 98 MG/DL (ref 40–70)
GLUCOSE SERPL-MCNC: 169 MG/DL (ref 70–110)
HCT VFR BLD AUTO: 37.5 % (ref 37–48.5)
HGB BLD-MCNC: 12.5 G/DL (ref 12–16)
LYMPHOCYTES # BLD AUTO: 5.4 K/UL (ref 1–4.8)
LYMPHOCYTES NFR BLD: 22.8 % (ref 18–48)
MAGNESIUM SERPL-MCNC: 1.7 MG/DL (ref 1.6–2.6)
MCH RBC QN AUTO: 31.6 PG (ref 27–31)
MCHC RBC AUTO-ENTMCNC: 33.3 G/DL (ref 32–36)
MCV RBC AUTO: 95 FL (ref 82–98)
MONOCYTES # BLD AUTO: 1.5 K/UL (ref 0.3–1)
MONOCYTES NFR BLD: 6.3 % (ref 4–15)
NEUTROPHILS # BLD AUTO: 16.8 K/UL (ref 1.8–7.7)
NEUTROPHILS NFR BLD: 72 % (ref 38–73)
PHOSPHATE SERPL-MCNC: 3 MG/DL (ref 2.7–4.5)
PLATELET # BLD AUTO: 309 K/UL (ref 150–350)
PLATELET BLD QL SMEAR: ABNORMAL
PMV BLD AUTO: 9.1 FL (ref 9.2–12.9)
POTASSIUM SERPL-SCNC: 4.1 MMOL/L (ref 3.5–5.1)
PROT CSF-MCNC: 48 MG/DL (ref 15–40)
PROT SERPL-MCNC: 6.9 G/DL (ref 6–8.4)
RBC # BLD AUTO: 3.95 M/UL (ref 4–5.4)
RBC # CSF: 0 /CU MM
SODIUM SERPL-SCNC: 135 MMOL/L (ref 136–145)
SPECIMEN VOL CSF: 2.5 ML
VANCOMYCIN TROUGH SERPL-MCNC: 6.9 UG/ML (ref 10–22)
WBC # BLD AUTO: 23.67 K/UL (ref 3.9–12.7)
WBC # CSF: 3 /CU MM (ref 0–5)

## 2019-04-10 PROCEDURE — 84157 ASSAY OF PROTEIN OTHER: CPT

## 2019-04-10 PROCEDURE — 86592 SYPHILIS TEST NON-TREP QUAL: CPT

## 2019-04-10 PROCEDURE — 84100 ASSAY OF PHOSPHORUS: CPT

## 2019-04-10 PROCEDURE — 87205 SMEAR GRAM STAIN: CPT

## 2019-04-10 PROCEDURE — 25000003 PHARM REV CODE 250: Performed by: HOSPITALIST

## 2019-04-10 PROCEDURE — 87070 CULTURE OTHR SPECIMN AEROBIC: CPT

## 2019-04-10 PROCEDURE — 25000003 PHARM REV CODE 250: Performed by: INTERNAL MEDICINE

## 2019-04-10 PROCEDURE — 83735 ASSAY OF MAGNESIUM: CPT

## 2019-04-10 PROCEDURE — 89051 BODY FLUID CELL COUNT: CPT

## 2019-04-10 PROCEDURE — 99232 SBSQ HOSP IP/OBS MODERATE 35: CPT | Mod: ,,, | Performed by: INTERNAL MEDICINE

## 2019-04-10 PROCEDURE — 80202 ASSAY OF VANCOMYCIN: CPT

## 2019-04-10 PROCEDURE — 63600175 PHARM REV CODE 636 W HCPCS: Performed by: PHYSICIAN ASSISTANT

## 2019-04-10 PROCEDURE — 63600175 PHARM REV CODE 636 W HCPCS: Performed by: HOSPITALIST

## 2019-04-10 PROCEDURE — 36415 COLL VENOUS BLD VENIPUNCTURE: CPT

## 2019-04-10 PROCEDURE — 99232 PR SUBSEQUENT HOSPITAL CARE,LEVL II: ICD-10-PCS | Mod: ,,, | Performed by: INTERNAL MEDICINE

## 2019-04-10 PROCEDURE — 85025 COMPLETE CBC W/AUTO DIFF WBC: CPT

## 2019-04-10 PROCEDURE — 63600175 PHARM REV CODE 636 W HCPCS: Performed by: INTERNAL MEDICINE

## 2019-04-10 PROCEDURE — 82945 GLUCOSE OTHER FLUID: CPT

## 2019-04-10 PROCEDURE — 80053 COMPREHEN METABOLIC PANEL: CPT

## 2019-04-10 RX ORDER — SUMATRIPTAN 6 MG/.5ML
6 INJECTION, SOLUTION SUBCUTANEOUS
Status: COMPLETED | OUTPATIENT
Start: 2019-04-10 | End: 2019-04-10

## 2019-04-10 RX ADMIN — PROPRANOLOL HYDROCHLORIDE 60 MG: 40 TABLET ORAL at 09:04

## 2019-04-10 RX ADMIN — SUMATRIPTAN SUCCINATE 6 MG: 6 INJECTION, SOLUTION SUBCUTANEOUS at 09:04

## 2019-04-10 RX ADMIN — CEFTRIAXONE 2 G: 2 INJECTION, SOLUTION INTRAVENOUS at 01:04

## 2019-04-10 RX ADMIN — VANCOMYCIN HYDROCHLORIDE 1500 MG: 1 INJECTION, POWDER, LYOPHILIZED, FOR SOLUTION INTRAVENOUS at 02:04

## 2019-04-10 RX ADMIN — FLUOXETINE 20 MG: 20 CAPSULE ORAL at 09:04

## 2019-04-10 RX ADMIN — LISINOPRIL 20 MG: 20 TABLET ORAL at 09:04

## 2019-04-10 RX ADMIN — TRAMADOL HYDROCHLORIDE 50 MG: 50 TABLET, FILM COATED ORAL at 09:04

## 2019-04-10 RX ADMIN — TRAMADOL HYDROCHLORIDE 50 MG: 50 TABLET, FILM COATED ORAL at 02:04

## 2019-04-10 NOTE — ASSESSMENT & PLAN NOTE
Her presentation is very concerning for meningitis.  She has not had a fever but did have some nuchal rigidity.  CT-head was negative for acute intracranial abnormalities.  She was unable to undergo lumbar puncture in the ED and has been started on empiric therapy with vancomycin and ceftriaxone.  IR has mervin consulted for LP,for some reason could not be done yesteaday,consulted ID as well,her headache is much better,but she has leucocytosis,wityh negative blood culture.

## 2019-04-10 NOTE — PROGRESS NOTES
[facility] Hospital Medicine  Progress Note    Patient Name: Samantha Sr  MRN: [id number]  Patient Class: IP- Inpatient   Admission Date: 4/8/2019  Length of Stay: 1 days  Attending Physician: Peng Bobby MD  Primary Care Provider: Kyle Rivera Jr, MD        Subjective:     Principal Problem:Meningitis    HPI:  Ms. Samantha Sr is a 34 y.o. female with essential hypertension, obesity (BMI 38.3), depression, and tobacco abuse who presents to [facility] ED with complaints of headache the past 3 days.  She reports that the headaches are localized to the bilateral temple area, is pressure-like in quality, and is 10/10 in severity at its worst.  She also reports some photophobia, neck stiffness, as well as nausea and vomiting of nonbloody but bilious fluid.  She denies any rashes, fevers, chills, abdominal pain, nor diarrhea, and has not had any recent sick contacts nor recent travel.  She does report an episode about a week ago where she had intense nausea, vomiting, and diaphoresis apparently out of nowhere.  She has never had similar symptoms in the past.  She has otherwise been in her usual state of health.    Hospital Course:  Ms. Samantha Sr is a 34 y.o. female with essential hypertension, obesity (BMI 38.3), depression, and tobacco abuse who presents to [facility] ED with complaints of headache the past 3 days.  She reports that the headaches are localized to the bilateral temple area, is pressure-like in quality, and is 10/10 in severity at its worst.  She also reports some photophobia, neck stiffness, as well as nausea and vomiting of nonbloody but bilious fluid.  She denies any rashes, fevers, chills, abdominal pain, nor diarrhea, and has not had any recent sick contacts nor recent travel.  She does report an episode about a week ago where she had intense nausea, vomiting, and diaphoresis apparently out of nowhere.  She has never had similar symptoms in the past.  She has  otherwise been in her usual state of health.it is concern for meningitis duo to severe headache and neck stiffness,she has been started on broad spectrum IV Abx,IR has mervin consulted for LP,for some reason could not be done yesteaday,consulted ID as well,her headache is much better,but she has leucocytosis,wityh negative blood culture.    Interval History:she say her headache is much better.    Review of Systems   Constitutional: Negative for activity change and appetite change.   HENT: Negative for congestion and dental problem.    Eyes: Negative for discharge and itching.   Respiratory: Negative for apnea.    Cardiovascular: Negative for chest pain and leg swelling.   Endocrine: Negative for heat intolerance.     Objective:     Vital Signs (Most Recent):  Temp: 98.3 °F (36.8 °C) (04/10/19 0753)  Pulse: 83 (04/10/19 0753)  Resp: 17 (04/10/19 0753)  BP: (!) 147/85 (04/10/19 0753)  SpO2: (!) 93 % (04/10/19 0753) Vital Signs (24h Range):  Temp:  [98.1 °F (36.7 °C)-98.5 °F (36.9 °C)] 98.3 °F (36.8 °C)  Pulse:  [83-92] 83  Resp:  [17-18] 17  SpO2:  [93 %-96 %] 93 %  BP: (129-147)/(68-85) 147/85     Weight: 100.3 kg (221 lb 1.9 oz)  Body mass index is 40.44 kg/m².    Intake/Output Summary (Last 24 hours) at 4/10/2019 0806  Last data filed at 4/10/2019 0215  Gross per 24 hour   Intake 780 ml   Output --   Net 780 ml      Physical Exam   Constitutional: She is oriented to person, place, and time. No distress.   HENT:   Head: Atraumatic.   Eyes: EOM are normal.   Neck: Neck supple.   Cardiovascular: Normal rate and regular rhythm.   Pulmonary/Chest: No respiratory distress.   Abdominal: Soft. Bowel sounds are normal.   Neurological: She is oriented to person, place, and time. No cranial nerve deficit. Coordination normal.       Significant Labs:   BMP:   Recent Labs   Lab 04/10/19  0511   *   *   K 4.1      CO2 23   BUN 23*   CREATININE 0.8   CALCIUM 9.2   MG 1.7     CBC:   Recent Labs   Lab  04/08/19 2323 04/09/19  0433 04/10/19  0511   WBC 14.15* 13.87* 23.67*   HGB 13.5 13.6 12.5   HCT 39.8 40.4 37.5    284 309     CMP:   Recent Labs   Lab 04/08/19 2323 04/09/19  0433 04/10/19  0511    135* 135*   K 4.1 4.0 4.1    101 103   CO2 25 22* 23   * 198* 169*   BUN 18 19 23*   CREATININE 0.7 0.8 0.8   CALCIUM 10.6* 10.0 9.2   PROT 7.6  --  6.9   ALBUMIN 4.2  --  3.6   BILITOT 0.2  --  0.2   ALKPHOS 89  --  75   AST 28  --  22   ALT 37  --  34   ANIONGAP 10 12 9   EGFRNONAA >60 >60 >60       Significant Imaging: reviewed,    Assessment/Plan:      * Meningitis  Her presentation is very concerning for meningitis.  She has not had a fever but did have some nuchal rigidity.  CT-head was negative for acute intracranial abnormalities.  She was unable to undergo lumbar puncture in the ED and has been started on empiric therapy with vancomycin and ceftriaxone.  IR has mervin consulted for LP,for some reason could not be done yesteaday,consulted ID as well,her headache is much better,but she has leucocytosis,wityh negative blood culture.    Headache  Improved with prn Tramadol,she say she may have migraine,      Tobacco abuse  Patient was counseled on smoking cessation and she will be provided a nicotine transdermal patch applied while inpatient.  Will provide additional smoking cessation counseling prior to discharge.    Obesity, Class II, BMI 35-39.9  The patient has been counseled on the negative impact that obesity imparts on her health and was encouraged to make lifestyle changes in order to lose weight and decrease her modifiable risk factors.    Essential hypertension  Patient's blood pressure is poorly-controlled; continue home regimen of lisinopril and propranolol, and provide as-needed clonidine.      Depression  Stable; will continue her home regimen of fluoxetine.      VTE Risk Mitigation (From admission, onward)        Ordered     IP VTE HIGH RISK PATIENT  Once      04/09/19 0440               Peng Bobby MD  Department of Hospital Medicine   [facility]

## 2019-04-10 NOTE — PROCEDURES
Radiology Post-Procedure Note    Pre Op Diagnosis: Intractable migraine HA with neck pain and photophobia  Post Op Diagnosis: Same    Procedure: Fluoroscopic-guided diagnostic LP    Procedure performed by: Gil Ogden MD    Written Informed Consent Obtained: Yes  Specimen Removed: YES, 9-cc of clear CSF  Estimated Blood Loss: none    Findings:   Successful fluoroscopic-guided diagnostic LP with local anesthetic only. Patient tolerated the procedure well. No immediate post-procedural complications noted.       Gil Ogden MD  Interventional Radiology

## 2019-04-10 NOTE — NURSING
Pt transferred back to floor via bed with surgical mask in place. Pt was educated on not getting out of bed by IR staff and nursing. Pt still got out of bed and ambulated to bathroom. Pt educated importance of laying flat and was told 11 am she could get up. Pt stated understanding and bed alarm applied. Will cont to monitor.

## 2019-04-10 NOTE — PLAN OF CARE
04/10/19 1603   Post-Acute Status   Post-Acute Authorization Other   Other Status No Post-Acute Service Needs   EDUCATION:  Things You are responsible For To Manage Your Care At Home:  1.    Getting your prescriptions filled   2.    Taking your medications as directed, DO NOT MISS ANY DOSES!  3.    Going to your follow-up doctor appointment. This is important because it  allow the doctor to monitor your progress and determine if  any changes need to made to your treatment plan.  Call [facility] Help at home number for new or repeated problems / symptoms   Call 911 for CP and / or SOB

## 2019-04-10 NOTE — PROGRESS NOTES
[facility]    WRITTEN HEALTHCARE AND DISCHARGE INFORMATION    Follow-up Information     Kyle Rivera Jr, MD On 4/16/2019.    Specialty:  Family Medicine  Why:  @1:00pm, for Hospital Follow up  Contact information:  [address] [place] [address]  [phone]                     Help at Home           [phone]  After discharge for assistance [facility] On Call Nurse Care Line 24/7  Assistance     Things You are responsible For To Manage Your Care At Home:  1.    Getting your prescriptions filled   2.    Taking your medications as directed, DO NOT MISS ANY DOSES!  3.    Going to your follow-up doctor appointment. This is important because it  allow the doctor to monitor your progress and determine if  any changes need to made to your treatment plan.     Thank you for choosing [facility] for your care.  Please answer any calls you may receive from [facility] we want to continue to support you as you manage your healthcare needs. [facility] is happy to have the opportunity to serve you.      Sincerely,  Your [facility] Team,  MARY JANE Orellana, ACM-RN; [facility]  [phone]

## 2019-04-10 NOTE — SUBJECTIVE & OBJECTIVE
Interval History:she say her headache is much better.    Review of Systems   Constitutional: Negative for activity change and appetite change.   HENT: Negative for congestion and dental problem.    Eyes: Negative for discharge and itching.   Respiratory: Negative for apnea.    Cardiovascular: Negative for chest pain and leg swelling.   Endocrine: Negative for heat intolerance.     Objective:     Vital Signs (Most Recent):  Temp: 98.3 °F (36.8 °C) (04/10/19 0753)  Pulse: 83 (04/10/19 0753)  Resp: 17 (04/10/19 0753)  BP: (!) 147/85 (04/10/19 0753)  SpO2: (!) 93 % (04/10/19 0753) Vital Signs (24h Range):  Temp:  [98.1 °F (36.7 °C)-98.5 °F (36.9 °C)] 98.3 °F (36.8 °C)  Pulse:  [83-92] 83  Resp:  [17-18] 17  SpO2:  [93 %-96 %] 93 %  BP: (129-147)/(68-85) 147/85     Weight: 100.3 kg (221 lb 1.9 oz)  Body mass index is 40.44 kg/m².    Intake/Output Summary (Last 24 hours) at 4/10/2019 0806  Last data filed at 4/10/2019 0215  Gross per 24 hour   Intake 780 ml   Output --   Net 780 ml      Physical Exam   Constitutional: She is oriented to person, place, and time. No distress.   HENT:   Head: Atraumatic.   Eyes: EOM are normal.   Neck: Neck supple.   Cardiovascular: Normal rate and regular rhythm.   Pulmonary/Chest: No respiratory distress.   Abdominal: Soft. Bowel sounds are normal.   Neurological: She is oriented to person, place, and time. No cranial nerve deficit. Coordination normal.       Significant Labs:   BMP:   Recent Labs   Lab 04/10/19  0511   *   *   K 4.1      CO2 23   BUN 23*   CREATININE 0.8   CALCIUM 9.2   MG 1.7     CBC:   Recent Labs   Lab 04/08/19  2323 04/09/19  0433 04/10/19  0511   WBC 14.15* 13.87* 23.67*   HGB 13.5 13.6 12.5   HCT 39.8 40.4 37.5    284 309     CMP:   Recent Labs   Lab 04/08/19  2323 04/09/19  0433 04/10/19  0511    135* 135*   K 4.1 4.0 4.1    101 103   CO2 25 22* 23   * 198* 169*   BUN 18 19 23*   CREATININE 0.7 0.8 0.8   CALCIUM 10.6*  10.0 9.2   PROT 7.6  --  6.9   ALBUMIN 4.2  --  3.6   BILITOT 0.2  --  0.2   ALKPHOS 89  --  75   AST 28  --  22   ALT 37  --  34   ANIONGAP 10 12 9   EGFRNONAA >60 >60 >60       Significant Imaging: reviewed,

## 2019-04-10 NOTE — NURSING
D/C instructions given to patient regarding follow up appt, medications to take and how to care for self @ home. Pt in no distress. No S&S of distress. Pt d/c home. IV d/c'd pt tolerated well.

## 2019-04-10 NOTE — PROGRESS NOTES
[facility]  Infectious Disease  Progress Note    Patient Name: Samantha Sr  MRN: [id number]  Admission Date: 4/8/2019  Length of Stay: 1 days  Attending Physician: Peng Bobby MD  Primary Care Provider: Kyle Rivera Jr, MD    Isolation Status: No active isolations  Assessment/Plan:      Headache  34 year old female with PMH of HTN, obesity, depression, and tobacco abuse presents with headache x 4 days; pt states different than her usual migraine headaches in severity and length; denies fevers or chills; has associated neck tightness and dizziness. Pt also suffering from allergies.     Pt is afebrile. WBC up to 23K today, however, pt was given steroids.  Now s/p LP- 3 WBCs seen. Cultures sent.     Plan:  1. Recommend stopping antibiotics at this time given negative LP  2. Migraine management per primary team  3. Ordered RPR as part of headache work up though low suspicion; f/u results with PCP  4. Will sign off    Thank you for your consult. I will sign off. Please contact us if you have any additional questions.  FREYA Braxton Pager: [phone]  Infectious Disease  [facility]    Subjective:     Principal Problem:Meningitis    HPI: This is a 35 y/o female with HTN, obesity, depression, and tobacco abuse who presents to [facility] ED with complaints of headache the past 4 days.  She reports that the headaches are localized to the bilateral temple area, is pressure-like in quality, and is 10/10 in severity at its worst.  She  states she had photophobia but that has resolved. She also reports neck soreness, as well as nausea and vomiting (now resolved). She has some associated dizziness.   She denies any rashes, fevers, chills, abdominal pain, nor diarrhea, and has not had any recent sick contacts nor recent travel.  She does report an episode about a week ago where she had intense nausea, vomiting, and diaphoresis with fever of 102.      She states headache is better  since getting pain meds. Pt is afebrile. Leukocytosis of 14,000. Patient states usually her migraines last a few hours but this one has lasted longer than any previously. She is also suffering from seasonal allergies and has had nasal congestion, sneezing, and mild coughing.     The ER was unable to perform LP due to patient's body habitus. She was started on empiric vancomycin and ceftriaxone. IR has been consulted to perform LP today. ID is consulted for antibiotic recs.       Interval History: had LP earlier today. Headache better. Afebrile. WBC elevated    Review of Systems   Constitutional: Negative for chills and fever.   Eyes: Negative for photophobia.   Respiratory: Negative for cough and shortness of breath.    Cardiovascular: Negative for chest pain and leg swelling.   Gastrointestinal: Negative for abdominal pain, constipation, diarrhea, nausea and vomiting.   Genitourinary: Negative for frequency.   Musculoskeletal: Negative for back pain, myalgias and neck pain.   Skin: Negative for rash and wound.   Neurological: Positive for headaches (resolved). Negative for dizziness and light-headedness.   Psychiatric/Behavioral: Negative for agitation and behavioral problems. The patient is not nervous/anxious.      Objective:     Vital Signs (Most Recent):  Temp: 98.1 °F (36.7 °C) (04/10/19 1153)  Pulse: 86 (04/10/19 1153)  Resp: 17 (04/10/19 1153)  BP: 133/88 (04/10/19 1153)  SpO2: (!) 94 % (04/10/19 1153) Vital Signs (24h Range):  Temp:  [98.1 °F (36.7 °C)-98.5 °F (36.9 °C)] 98.1 °F (36.7 °C)  Pulse:  [83-92] 86  Resp:  [17-18] 17  SpO2:  [93 %-96 %] 94 %  BP: (129-147)/(68-88) 133/88     Weight: 100.3 kg (221 lb 1.9 oz)  Body mass index is 40.44 kg/m².    Estimated Creatinine Clearance: 107.7 mL/min (based on SCr of 0.8 mg/dL).    Physical Exam   Constitutional: She is oriented to person, place, and time. She appears well-developed and well-nourished. No distress.   Lying flat   Neck: Normal range of motion  and full passive range of motion without pain. No muscular tenderness present.   Cardiovascular: Normal rate, regular rhythm and normal heart sounds. Exam reveals no gallop and no friction rub.   No murmur heard.  Pulmonary/Chest: Effort normal and breath sounds normal. No respiratory distress. She has no wheezes. She has no rales.   Abdominal: Soft. Bowel sounds are normal. She exhibits no distension and no mass. There is no hepatosplenomegaly. There is no tenderness. There is no rebound and no guarding.   Musculoskeletal: She exhibits no edema.   Neurological: She is alert and oriented to person, place, and time.   Skin: Skin is warm, dry and intact. No rash noted.   Psychiatric: She has a normal mood and affect.       Significant Labs:   Blood Culture: No results for input(s): LABBLOO in the last 4320 hours.  CBC:   Recent Labs   Lab 04/08/19  2323 04/09/19  0433 04/10/19  0511   WBC 14.15* 13.87* 23.67*   HGB 13.5 13.6 12.5   HCT 39.8 40.4 37.5    284 309     CMP:   Recent Labs   Lab 04/08/19 2323 04/09/19 0433 04/10/19  0511    135* 135*   K 4.1 4.0 4.1    101 103   CO2 25 22* 23   * 198* 169*   BUN 18 19 23*   CREATININE 0.7 0.8 0.8   CALCIUM 10.6* 10.0 9.2   PROT 7.6  --  6.9   ALBUMIN 4.2  --  3.6   BILITOT 0.2  --  0.2   ALKPHOS 89  --  75   AST 28  --  22   ALT 37  --  34   ANIONGAP 10 12 9   EGFRNONAA >60 >60 >60     CSF: No results for input(s): CSFCULTURE in the last 4320 hours.    Significant Imaging: I have reviewed all pertinent imaging results/findings within the past 24 hours.

## 2019-04-10 NOTE — PLAN OF CARE
Problem: Adult Inpatient Plan of Care  Goal: Plan of Care Review  Outcome: Ongoing (interventions implemented as appropriate)     04/10/19 0436   Plan of Care Review   Plan of Care Reviewed With patient   AAOX4, remains free from fall or injury throughout shift. Scheduled meds administered as ordered. VSS. Remains on IV antibiotics. Droplet precautions remain as ordered. C/o headache during shift, however reports she is feeling much better. Pain is now consistent at a 5/10 on the pain scale. Bed in low locked position, with call light in reach. Will continue to monitor.

## 2019-04-10 NOTE — HOSPITAL COURSE
Ms. Samantha Sr is a 34 y.o. female with essential hypertension, obesity (BMI 38.3), depression, and tobacco abuse who presents to [facility] ED with complaints of headache the past 3 days.  She reports that the headaches are localized to the bilateral temple area, is pressure-like in quality, and is 10/10 in severity at its worst.  She also reports some photophobia, neck stiffness, as well as nausea and vomiting of nonbloody but bilious fluid.  She denies any rashes, fevers, chills, abdominal pain, nor diarrhea, and has not had any recent sick contacts nor recent travel.  She does report an episode about a week ago where she had intense nausea, vomiting, and diaphoresis apparently out of nowhere.  She has never had similar symptoms in the past.  She has otherwise been in her usual state of health.it is concern for meningitis duo to severe headache and neck stiffness,she has been started on broad spectrum IV Abx,IR did LP,show no sign of infection,iD was consulted,her symptoms resolved,had negative blood culture.patient has been discharged home with PCP follow up.

## 2019-04-10 NOTE — PLAN OF CARE
04/10/19 1602   Final Note   Assessment Type Final Discharge Note   Anticipated Discharge Disposition Home   What phone number can be called within the next 1-3 days to see how you are doing after discharge?   ([phone])   Hospital Follow Up  Appt(s) scheduled? Yes   Discharge plans and expectations educations in teach back method with documentation complete? Yes   Right Care Referral Info   Post Acute Recommendation No Care

## 2019-04-10 NOTE — SUBJECTIVE & OBJECTIVE
Interval History: had LP earlier today. Headache better. Afebrile. WBC elevated    Review of Systems   Constitutional: Negative for chills and fever.   Eyes: Negative for photophobia.   Respiratory: Negative for cough and shortness of breath.    Cardiovascular: Negative for chest pain and leg swelling.   Gastrointestinal: Negative for abdominal pain, constipation, diarrhea, nausea and vomiting.   Genitourinary: Negative for frequency.   Musculoskeletal: Negative for back pain, myalgias and neck pain.   Skin: Negative for rash and wound.   Neurological: Positive for headaches (resolved). Negative for dizziness and light-headedness.   Psychiatric/Behavioral: Negative for agitation and behavioral problems. The patient is not nervous/anxious.      Objective:     Vital Signs (Most Recent):  Temp: 98.1 °F (36.7 °C) (04/10/19 1153)  Pulse: 86 (04/10/19 1153)  Resp: 17 (04/10/19 1153)  BP: 133/88 (04/10/19 1153)  SpO2: (!) 94 % (04/10/19 1153) Vital Signs (24h Range):  Temp:  [98.1 °F (36.7 °C)-98.5 °F (36.9 °C)] 98.1 °F (36.7 °C)  Pulse:  [83-92] 86  Resp:  [17-18] 17  SpO2:  [93 %-96 %] 94 %  BP: (129-147)/(68-88) 133/88     Weight: 100.3 kg (221 lb 1.9 oz)  Body mass index is 40.44 kg/m².    Estimated Creatinine Clearance: 107.7 mL/min (based on SCr of 0.8 mg/dL).    Physical Exam   Constitutional: She is oriented to person, place, and time. She appears well-developed and well-nourished. No distress.   Lying flat   Neck: Normal range of motion and full passive range of motion without pain. No muscular tenderness present.   Cardiovascular: Normal rate, regular rhythm and normal heart sounds. Exam reveals no gallop and no friction rub.   No murmur heard.  Pulmonary/Chest: Effort normal and breath sounds normal. No respiratory distress. She has no wheezes. She has no rales.   Abdominal: Soft. Bowel sounds are normal. She exhibits no distension and no mass. There is no hepatosplenomegaly. There is no tenderness. There is no  rebound and no guarding.   Musculoskeletal: She exhibits no edema.   Neurological: She is alert and oriented to person, place, and time.   Skin: Skin is warm, dry and intact. No rash noted.   Psychiatric: She has a normal mood and affect.       Significant Labs:   Blood Culture: No results for input(s): LABBLOO in the last 4320 hours.  CBC:   Recent Labs   Lab 04/08/19 2323 04/09/19 0433 04/10/19  0511   WBC 14.15* 13.87* 23.67*   HGB 13.5 13.6 12.5   HCT 39.8 40.4 37.5    284 309     CMP:   Recent Labs   Lab 04/08/19 2323 04/09/19 0433 04/10/19  0511    135* 135*   K 4.1 4.0 4.1    101 103   CO2 25 22* 23   * 198* 169*   BUN 18 19 23*   CREATININE 0.7 0.8 0.8   CALCIUM 10.6* 10.0 9.2   PROT 7.6  --  6.9   ALBUMIN 4.2  --  3.6   BILITOT 0.2  --  0.2   ALKPHOS 89  --  75   AST 28  --  22   ALT 37  --  34   ANIONGAP 10 12 9   EGFRNONAA >60 >60 >60     CSF: No results for input(s): CSFCULTURE in the last 4320 hours.    Significant Imaging: I have reviewed all pertinent imaging results/findings within the past 24 hours.

## 2019-04-10 NOTE — ASSESSMENT & PLAN NOTE
Patient's blood pressure is poorly-controlled; continue home regimen of lisinopril and propranolol, and provide as-needed clonidine.

## 2019-04-10 NOTE — DISCHARGE SUMMARY
[facility] Hospital Medicine  Discharge Summary      Patient Name: Samantha Sr  MRN: [id number]  Admission Date: 4/8/2019  Hospital Length of Stay: 3 days   Discharge Date and Time:  04/10/2019 1:49 PM  Attending Physician: Peng Bobby MD   Discharging Provider: Peng Bobby MD  Primary Care Provider: Kyle Rivera Jr, MD      HPI:   Ms. Samantha Sr is a 34 y.o. female with essential hypertension, obesity (BMI 38.3), depression, and tobacco abuse who presents to [facility] ED with complaints of headache the past 3 days.  She reports that the headaches are localized to the bilateral temple area, is pressure-like in quality, and is 10/10 in severity at its worst.  She also reports some photophobia, neck stiffness, as well as nausea and vomiting of nonbloody but bilious fluid.  She denies any rashes, fevers, chills, abdominal pain, nor diarrhea, and has not had any recent sick contacts nor recent travel.  She does report an episode about a week ago where she had intense nausea, vomiting, and diaphoresis apparently out of nowhere.  She has never had similar symptoms in the past.  She has otherwise been in her usual state of health.    * No surgery found *      Hospital Course:   Ms. Samantha Sr is a 34 y.o. female with essential hypertension, obesity (BMI 38.3), depression, and tobacco abuse who presents to [facility] ED with complaints of headache the past 3 days.  She reports that the headaches are localized to the bilateral temple area, is pressure-like in quality, and is 10/10 in severity at its worst.  She also reports some photophobia, neck stiffness, as well as nausea and vomiting of nonbloody but bilious fluid.  She denies any rashes, fevers, chills, abdominal pain, nor diarrhea, and has not had any recent sick contacts nor recent travel.  She does report an episode about a week ago where she had intense nausea, vomiting, and diaphoresis apparently out of  nowhere.  She has never had similar symptoms in the past.  She has otherwise been in her usual state of health.it is concern for meningitis duo to severe headache and neck stiffness,she has been started on broad spectrum IV Abx,IR did LP,show no sign of infection,iD was consulted,her symptoms resolved,had negative blood culture.patient has been discharged home with PCP follow up.     Consults:   Consults (From admission, onward)        Status Ordering Provider     Inpatient consult to Infectious Diseases  Once     Provider:  Tyrel Victoria MD    Completed ELIZA GUIDRY     Inpatient consult to Interventional Radiology  Once     Provider:  Gil Ogden MD    Completed АЛЕКСАНДР CLAY     Inpatient consult to Interventional Radiology  Once     Provider:  Gil Ogden MD    Completed АЛЕКСАНДР CLAY            Final Active Diagnoses:    Diagnosis Date Noted POA    PRINCIPAL PROBLEM:  Meningitis [G03.9] 04/09/2019 Yes    Essential hypertension [I10] 04/09/2019 Yes     Chronic    Obesity, Class II, BMI 35-39.9 [E66.9] 04/09/2019 Yes     Chronic    Tobacco abuse [Z72.0] 04/09/2019 Yes     Chronic    Headache [R51] 04/09/2019 Yes    Depression [F32.9]  Yes     Chronic      Problems Resolved During this Admission:       Discharged Condition: stable    Disposition: Home or Self Care    Follow Up:  Follow-up Information     Kyle Rivera Jr, MD In 1 week.    Specialty:  Family Medicine  Contact information:  [address] [place] [address]  [place] [id number] [phone]                 Patient Instructions:      Activity as tolerated       Significant Diagnostic Studies: Labs:   BMP:   Recent Labs   Lab 04/08/19 2323 04/09/19 0433 04/10/19  0511   * 198* 169*    135* 135*   K 4.1 4.0 4.1    101 103   CO2 25 22* 23   BUN 18 19 23*   CREATININE 0.7 0.8 0.8   CALCIUM 10.6* 10.0 9.2   MG  --   --  1.7    and CBC   Recent Labs   Lab 04/08/19 2323 04/09/19 0433 04/10/19  0511   WBC  14.15* 13.87* 23.67*   HGB 13.5 13.6 12.5   HCT 39.8 40.4 37.5    284 309     Microbiology:   Blood Culture   Lab Results   Component Value Date    LABBLOO NO GROWTH AFTER 5 DAYS. FINAL REPORT 01/01/2013     Radiology: CT scan:head     Pending Diagnostic Studies:     Procedure Component Value Units Date/Time    IR Lumbar Puncture Diagnostic [[id number]] Resulted:  04/10/19 0828    Order Status:  Sent Lab Status:  In process Updated:  04/10/19 0855    RPR [[id number]]     Order Status:  Sent Lab Status:  No result     Specimen:  Blood     VANCOMYCIN, TROUGH before 4th dose [[id number]] Collected:  04/10/19 1400    Order Status:  Sent Lab Status:  No result     Specimen:  Blood          Medications:  Reconciled Home Medications:      Medication List      CONTINUE taking these medications    ALPRAZolam 0.5 MG tablet  Commonly known as:  XANAX  Take 0.5 mg by mouth as needed.     dextroamphetamine-amphetamine 30 MG 24 hr capsule  Commonly known as:  ADDERALL XR  Take 30 mg by mouth every morning.     diphenhydrAMINE 25 mg capsule  Commonly known as:  BENADRYL  Take 1 each (25 mg total) by mouth every 6 (six) hours as needed for Itching or Allergies.     docusate sodium 100 MG capsule  Commonly known as:  COLACE  Take 1 capsule (100 mg total) by mouth 2 (two) times daily.     fenofibrate 145 MG tablet  Commonly known as:  TRICOR  Take 145 mg by mouth once daily.     FLUoxetine 20 MG capsule  Take 20 mg by mouth once daily.     ketorolac 10 mg tablet  Commonly known as:  TORADOL  Take 1 tablet (10 mg total) by mouth every 6 (six) hours as needed for Pain.     levocetirizine 5 MG tablet  Commonly known as:  XYZAL  Take 1 tablet (5 mg total) by mouth every evening.     lisinopril 10 MG tablet  Take 10 mg by mouth once daily.     ondansetron 4 MG Tbdl  Commonly known as:  ZOFRAN-ODT  Take 1 tablet (4 mg total) by mouth every 6 (six) hours as needed (nausea).     propranolol 60 MG tablet  Commonly known as:  INDERAL  Take  60 mg by mouth 2 (two) times daily.     SPRINTEC (28) 0.25-35 mg-mcg per tablet  Generic drug:  norgestimate-ethinyl estradiol  TAKE ONE TABLET BY MOUTH EVERY DAY            Indwelling Lines/Drains at time of discharge:   Lines/Drains/Airways          None          Time spent on the discharge of patient: over 30  minutes  Patient was seen and examined on the date of discharge and determined to be suitable for discharge.         Peng Bobby MD  Department of Hospital Medicine  [facility]

## 2019-04-12 ENCOUNTER — PATIENT OUTREACH (OUTPATIENT)
Dept: ADMINISTRATIVE | Facility: CLINIC | Age: 34
End: 2019-04-12

## 2019-04-12 LAB — RPR SER QL: NORMAL

## 2019-04-12 NOTE — PATIENT INSTRUCTIONS
Migraine Headache  Migraine headaches are related to changes in blood flow to the brain. This causes throbbing or constant pain on one or both sides of the head. The pain may last from a few hours to several days. There is usually nausea, vomiting, sensitivity to light and sound, and blurred vision. A migraine attack may be triggered by emotional stress, hormone changes during the menstrual cycle, oral contraceptives, alcohol use, certain foods containing tyramine, eye strain, weather changes, missing meals, or too little or too much sleep.  Home Care For This Headache:  1) If you were given pain medicine for this headache, do not drive yourself home . Arrange for a ride, instead. When you get home, try to sleep. You should feel much better when you wake up.  2) Migraine headaches may improve with an ice pack on the forehead or at the base of the skull. Heat to the back of your neck may relieve any neck spasm.  3) Drink only clear liquids or eat a very light diet to avoid nausea/vomiting until symptoms improve.  Preventing Future Headaches:  1) Pay attention to those factors that seem to trigger your headache. Try to avoid them when you can. If you have frequent headaches, it is useful to keep a diary of what you were doing, feeling or eating in the hours before each attack. Show this to your doctor to help find the cause of your headaches.  a) If you feel that stress is a factor in your headaches, look at the sources of stress in your life. Find ways to release the build-up of those stresses by using regular exercise, relaxation methods (yoga, meditation), bio-feedback or simply taking time-out for yourself. For more information about this, consult your doctor or go to a local bookstore and review books and tapes on this subject.  b) Tyramine is a substance present in the following foods : chocolate, yogurt, all cheeses except cottage cheese and cream cheese. smoked or pickled fish and meat (including herring,  caviar, bologna, pepperoni, salami), liver, avocados, bananas, figs, raisins, and red wine. Be aware that these foods may trigger a migraine in some persons. Try taking these foods out of your diet for 1-2 months to see if this reduces headache frequency.  Treating Future Attacks:  1) At the first sign of a headache, take time out if possible. Find a quiet, dark, comfortable place to sit or lie down. Let yourself relax or sleep.  2) An ice pack on the forehead or area of greatest pain may help. If you are having muscle spasm and tightness of the neck, a heating pad and massage to this area may be helpful.  3) If you have been prescribed a medicine to stop a migraine headache, use this at the very first warning sign of the headache (aura or initial pain) for best results.  Follow Up  with your doctor if the headache is not better within the next 24 hours. If you have frequent headaches you should discuss a treatment plan with your primary care doctor. Ask if you can have medicine to take at home the next time you get a bad headache. Poorly controlled chronic headaches may require a referral to a neurologist (headache specialist).  Get Prompt Medical Attention  if any of the following occur:  Your head pain gets worse, or does not improve within 24 hours  Repeated vomiting (cant keep liquids down)  Sinus or ear or throat pain (not already reported)  Fever of 100.4º F (38º C) or higher, or as directed by your healthcare provider  Stiff neck  Extreme drowsiness, confusion or fainting  Dizziness, vertigo (dizziness with spinning sensation)  Weakness of an arm or leg or one side of the face  Difficulty with speech or vision  © 6233-2139 [employer], [address] [place] [address], [place] [address]. All rights reserved. This information is not intended as a substitute for professional medical care. Always follow your healthcare professional's instructions.

## 2019-04-13 LAB
BACTERIA CSF CULT: NO GROWTH
GRAM STN SPEC: NORMAL
GRAM STN SPEC: NORMAL

## 2019-05-17 ENCOUNTER — ANTI-COAG VISIT (OUTPATIENT)
Dept: ADMINISTRATIVE | Facility: OTHER | Age: 34
End: 2019-05-17

## 2019-06-22 ENCOUNTER — HOSPITAL ENCOUNTER (EMERGENCY)
Facility: HOSPITAL | Age: 34
Discharge: HOME | End: 2019-06-22
Attending: EMERGENCY MEDICINE
Payer: MEDICAID

## 2019-06-22 VITALS
DIASTOLIC BLOOD PRESSURE: 96 MMHG | OXYGEN SATURATION: 99 % | SYSTOLIC BLOOD PRESSURE: 145 MMHG | HEIGHT: 62 IN | HEART RATE: 103 BPM | RESPIRATION RATE: 18 BRPM | BODY MASS INDEX: 34.96 KG/M2 | TEMPERATURE: 99 F | WEIGHT: 190 LBS

## 2019-06-22 DIAGNOSIS — R22.0 FACIAL SWELLING: Primary | ICD-10-CM

## 2019-06-22 DIAGNOSIS — R03.0 ELEVATED BLOOD PRESSURE READING WITHOUT DIAGNOSIS OF HYPERTENSION: ICD-10-CM

## 2019-06-22 PROCEDURE — 99284 EMERGENCY DEPT VISIT MOD MDM: CPT | Mod: 25

## 2019-06-22 PROCEDURE — 63600175 PHARM REV CODE 636 W HCPCS: Performed by: EMERGENCY MEDICINE

## 2019-06-22 PROCEDURE — 25000003 PHARM REV CODE 250: Performed by: EMERGENCY MEDICINE

## 2019-06-22 PROCEDURE — 96372 THER/PROPH/DIAG INJ SC/IM: CPT

## 2019-06-22 RX ORDER — ONDANSETRON 4 MG/1
4 TABLET, ORALLY DISINTEGRATING ORAL
Status: COMPLETED | OUTPATIENT
Start: 2019-06-22 | End: 2019-06-22

## 2019-06-22 RX ORDER — ONDANSETRON 4 MG/1
4 TABLET, FILM COATED ORAL EVERY 6 HOURS
Qty: 12 TABLET | Refills: 0 | Status: SHIPPED | OUTPATIENT
Start: 2019-06-22 | End: 2020-02-11 | Stop reason: CLARIF

## 2019-06-22 RX ORDER — DIPHENHYDRAMINE HCL 50 MG
50 CAPSULE ORAL EVERY 6 HOURS PRN
Qty: 30 CAPSULE | Refills: 0 | OUTPATIENT
Start: 2019-06-22 | End: 2019-06-27

## 2019-06-22 RX ORDER — CLINDAMYCIN HYDROCHLORIDE 150 MG/1
300 CAPSULE ORAL EVERY 8 HOURS
Qty: 60 CAPSULE | Refills: 0 | Status: SHIPPED | OUTPATIENT
Start: 2019-06-22 | End: 2019-07-02

## 2019-06-22 RX ORDER — MELOXICAM 15 MG/1
15 TABLET ORAL DAILY
Qty: 30 TABLET | Refills: 0 | Status: SHIPPED | OUTPATIENT
Start: 2019-06-22 | End: 2020-02-11 | Stop reason: CLARIF

## 2019-06-22 RX ORDER — KETOROLAC TROMETHAMINE 30 MG/ML
30 INJECTION, SOLUTION INTRAMUSCULAR; INTRAVENOUS
Status: COMPLETED | OUTPATIENT
Start: 2019-06-22 | End: 2019-06-22

## 2019-06-22 RX ORDER — CLINDAMYCIN HYDROCHLORIDE 150 MG/1
300 CAPSULE ORAL
Status: COMPLETED | OUTPATIENT
Start: 2019-06-22 | End: 2019-06-22

## 2019-06-22 RX ADMIN — CLINDAMYCIN HYDROCHLORIDE 300 MG: 150 CAPSULE ORAL at 01:06

## 2019-06-22 RX ADMIN — ONDANSETRON 4 MG: 4 TABLET, ORALLY DISINTEGRATING ORAL at 01:06

## 2019-06-22 RX ADMIN — KETOROLAC TROMETHAMINE 30 MG: 30 INJECTION, SOLUTION INTRAMUSCULAR at 01:06

## 2019-06-22 NOTE — DISCHARGE INSTRUCTIONS
Follow up with your doctor for blood pressure recheck.    Thank you for coming to our Emergency Department today. It is important to remember that some problems are difficult to diagnose and may not be found during your first visit. Be sure to follow up with your primary care doctor and review any labs/imaging that was performed with them. If you do not have a primary care doctor, you may contact the one listed on your discharge paperwork or you may also call the [facility] Appointment Desk at [phone] to schedule an appointment with one.     All medications may potentially have side effects and it is impossible to predict which medications may give you side effects. If you feel that you are having a negative effect of any medication you should immediately stop taking them and seek medical attention.    Return to the ER with any questions/concerns, new/concerning symptoms, worsening or failure to improve. Do not drive or make any important decisions for 24 hours if you have received any pain medications, sedatives or mood altering drugs during your ER visit.

## 2019-06-22 NOTE — ED NOTES
MD reports that pt does not require a pregnancy test and pt reports that she is not currently sexually after

## 2019-06-22 NOTE — ED PROVIDER NOTES
"Encounter Date: 6/22/2019       History     Chief Complaint   Patient presents with    Facial Swelling     Pt reports woke up this morning with swelling to the right side of face, states she feels like someone hit her with a bat in the face. Denies trauma. Having some nausea      34 y.o. female Past Medical History:  No date: Anxiety  No date: Bladder infection  No date: Carpal tunnel syndrome  No date: Depression  No date: Hypertension  No date: Migraine headache  No date: Pancreatitis  No date: UTI (lower urinary tract infection)     Presents for evaluation of R facial pain/swelling since this am. Notes that she woke up like this, it is painful not itchy, denies dental pain. No f/c,n/v, diarrhea/dyusria. States there is no chance she could be pregnant as she has not had sex.        Review of patient's allergies indicates:   Allergen Reactions    Compazine [prochlorperazine edisylate] Anxiety    Fioricet [butalbital-acetaminophen-caff] Other (See Comments)     Reaction:  "Really bad yeast infections"    Reglan [metoclopramide] Rash     Past Medical History:   Diagnosis Date    Anxiety     Bladder infection     Carpal tunnel syndrome     Depression     Hypertension     Migraine headache     Pancreatitis     UTI (lower urinary tract infection)      Past Surgical History:   Procedure Laterality Date    CHOLECYSTECTOMY      CHOLECYSTECTOMY, LAPAROSCOPIC N/A 12/21/2012    Performed [facility]    JOINT REPLACEMENT      KNEE SURGERY   aug 2008    right knee    KNEE SURGERY      REDUCTION MAMMAPLASTY      Breast reduction     Family History   Problem Relation Age of Onset    Hypertension Mother     Hypertension Father     Hypertension Maternal Grandmother     Breast cancer Neg Hx     Colon cancer Neg Hx     Ovarian cancer Neg Hx      Social History     Tobacco Use    Smoking status: Former Smoker     Packs/day: 0.50     Years: 8.00     Pack years: 4.00     Types: Cigarettes "     Last attempt to quit: 2018     Years since quittin.5    Smokeless tobacco: Never Used    Tobacco comment: Pt states she smokes 1 pack of cigarettes in 3 days.   Substance Use Topics    Alcohol use: No    Drug use: No     Review of Systems   Constitutional: Negative for fever.   HENT: Negative for sore throat.    Respiratory: Negative for shortness of breath.    Cardiovascular: Negative for chest pain.   Gastrointestinal: Negative for nausea.   Genitourinary: Negative for dysuria.   Musculoskeletal: Negative for back pain.   Skin: Negative for rash.   Neurological: Negative for weakness.   Hematological: Does not bruise/bleed easily.   All other systems reviewed and are negative.      Physical Exam     Initial Vitals [19 1317]   BP Pulse Resp Temp SpO2   (!) 171/110 (!) 111 18 98.9 °F (37.2 °C) 98 %      MAP       --         Physical Exam    Nursing note and vitals reviewed.  Constitutional: She appears well-developed and well-nourished.   HENT:   Head: Normocephalic and atraumatic.   Eyes: Conjunctivae and EOM are normal. Pupils are equal, round, and reactive to light.   Neck: Normal range of motion.   Cardiovascular: Normal rate.   Pulmonary/Chest: No respiratory distress.   Abdominal: She exhibits no distension.   Musculoskeletal: Normal range of motion.   Neurological: She is alert. No cranial nerve deficit. GCS score is 15. GCS eye subscore is 4. GCS verbal subscore is 5. GCS motor subscore is 6.   Skin: Skin is warm and dry.   Psychiatric: She has a normal mood and affect. Thought content normal.     oropharynx clear  Small wound on R cheek with surrounding swelling, mild induration, slight erythema, no fluctuance    ED Course   Procedures  Labs Reviewed - No data to display       Imaging Results    None          Medical Decision Making:   Initial Assessment:   Concern for early cellulitis. No pruritis to suggest allergy, will start on mobic/ranitidine for pain/inflammation, clinda for  cellulitis, and benadryl.              will have pt f/u with pmd for bp recheck.        Clinical Impression:       ICD-10-CM ICD-9-CM   1. Facial swelling R22.0 784.2   2. Elevated blood pressure reading without diagnosis of hypertension R03.0 796.2                                Hunter De La Cruz MD  06/22/19 1329       Hunter De La Cruz MD  06/22/19 1332

## 2019-06-22 NOTE — ED TRIAGE NOTES
Pt arrived to the ED due to right sided facial swelling and pain 10/10 that began when the pt woke up this morning. Pt denies any other s/s as of now

## 2019-06-23 ENCOUNTER — HOSPITAL ENCOUNTER (EMERGENCY)
Facility: HOSPITAL | Age: 34
Discharge: HOME | End: 2019-06-23
Attending: EMERGENCY MEDICINE
Payer: MEDICAID

## 2019-06-23 VITALS
SYSTOLIC BLOOD PRESSURE: 132 MMHG | WEIGHT: 190 LBS | TEMPERATURE: 99 F | DIASTOLIC BLOOD PRESSURE: 82 MMHG | HEIGHT: 61 IN | RESPIRATION RATE: 18 BRPM | BODY MASS INDEX: 35.87 KG/M2 | OXYGEN SATURATION: 97 % | HEART RATE: 85 BPM

## 2019-06-23 DIAGNOSIS — L03.211 FACIAL CELLULITIS: ICD-10-CM

## 2019-06-23 DIAGNOSIS — K04.7 DENTAL ABSCESS: Primary | ICD-10-CM

## 2019-06-23 LAB
B-HCG UR QL: NEGATIVE
CTP QC/QA: YES

## 2019-06-23 PROCEDURE — 25000003 PHARM REV CODE 250: Performed by: PHYSICIAN ASSISTANT

## 2019-06-23 PROCEDURE — 96372 THER/PROPH/DIAG INJ SC/IM: CPT

## 2019-06-23 PROCEDURE — 63600175 PHARM REV CODE 636 W HCPCS: Performed by: PHYSICIAN ASSISTANT

## 2019-06-23 PROCEDURE — 99284 EMERGENCY DEPT VISIT MOD MDM: CPT | Mod: 25

## 2019-06-23 PROCEDURE — 81025 URINE PREGNANCY TEST: CPT | Performed by: PHYSICIAN ASSISTANT

## 2019-06-23 RX ORDER — PROMETHAZINE HYDROCHLORIDE 25 MG/1
25 SUPPOSITORY RECTAL EVERY 6 HOURS PRN
Qty: 10 SUPPOSITORY | Refills: 0 | Status: SHIPPED | OUTPATIENT
Start: 2019-06-23 | End: 2019-06-23 | Stop reason: SDUPTHER

## 2019-06-23 RX ORDER — PROMETHAZINE HYDROCHLORIDE 25 MG/1
25 TABLET ORAL
Status: COMPLETED | OUTPATIENT
Start: 2019-06-23 | End: 2019-06-23

## 2019-06-23 RX ORDER — PROMETHAZINE HYDROCHLORIDE 25 MG/1
25 SUPPOSITORY RECTAL EVERY 6 HOURS PRN
Qty: 10 SUPPOSITORY | Refills: 0 | Status: SHIPPED | OUTPATIENT
Start: 2019-06-23 | End: 2019-07-03 | Stop reason: ALTCHOICE

## 2019-06-23 RX ORDER — CHLORHEXIDINE GLUCONATE ORAL RINSE 1.2 MG/ML
15 SOLUTION DENTAL 2 TIMES DAILY
Qty: 420 ML | Refills: 0 | Status: SHIPPED | OUTPATIENT
Start: 2019-06-23 | End: 2019-07-07

## 2019-06-23 RX ORDER — OXYCODONE AND ACETAMINOPHEN 5; 325 MG/1; MG/1
2 TABLET ORAL
Status: COMPLETED | OUTPATIENT
Start: 2019-06-23 | End: 2019-06-23

## 2019-06-23 RX ORDER — ACETAMINOPHEN 500 MG
500 TABLET ORAL EVERY 4 HOURS PRN
Qty: 20 TABLET | Refills: 0 | Status: SHIPPED | OUTPATIENT
Start: 2019-06-23 | End: 2019-06-28

## 2019-06-23 RX ORDER — IBUPROFEN 600 MG/1
600 TABLET ORAL EVERY 6 HOURS PRN
Qty: 20 TABLET | Refills: 0 | Status: SHIPPED | OUTPATIENT
Start: 2019-06-23 | End: 2019-06-28

## 2019-06-23 RX ORDER — KETOROLAC TROMETHAMINE 30 MG/ML
15 INJECTION, SOLUTION INTRAMUSCULAR; INTRAVENOUS
Status: COMPLETED | OUTPATIENT
Start: 2019-06-23 | End: 2019-06-23

## 2019-06-23 RX ORDER — BUPIVACAINE HCL/EPINEPHRINE 0.5-1:200K
3.6 VIAL (ML) INJECTION
Status: COMPLETED | OUTPATIENT
Start: 2019-06-23 | End: 2019-06-23

## 2019-06-23 RX ADMIN — BUPIVACAINE HYDROCHLORIDE AND EPINEPHRINE 3.6 ML: 5; 5 INJECTION, SOLUTION PERINEURAL at 10:06

## 2019-06-23 RX ADMIN — OXYCODONE HYDROCHLORIDE AND ACETAMINOPHEN 2 TABLET: 5; 325 TABLET ORAL at 10:06

## 2019-06-23 RX ADMIN — KETOROLAC TROMETHAMINE 15 MG: 30 INJECTION, SOLUTION INTRAMUSCULAR at 10:06

## 2019-06-23 RX ADMIN — PROMETHAZINE HYDROCHLORIDE 25 MG: 25 TABLET ORAL at 10:06

## 2019-06-24 NOTE — ED PROVIDER NOTES
"Encounter Date: 6/23/2019       History     Chief Complaint   Patient presents with    Facial Swelling     Pt seen here yesterday for facial swelling. Pt returns tonight for swelling in a different area of her face and c/o some numbness.      CC: Facial Swelling    HPI:   33 y/o female history of HTN, depression for evaluation of 1 day history of right-sided facial swelling and left-sided facial swelling and reported numbness that began today.  Patient denies any trauma to the area.  She was evaluated this facility yesterday and prescribed clindamycin for facial cellulitis.  Patient reports she had nausea and vomiting times 15 today as well as lightheadedness that she believes is secondary to pain.  She reports intermittent blurry vision to bilateral eyes that may be due to dehydration.  She denies any trauma, chest pain, shortness of breath, headache, weakness. Last dental visit greater than 1 year ago.  Patient denied any history of facial swelling or dental pain repeatedly. However, review of [facility] chart shows the patient has been evaluated at [facility] in [place] and [facility] within the past 2 months for dental related symptoms and facial swelling. Pt may be exhibiting drug seeking behavior. Attempted tx with ibuprofen, toradol and mobic for pain and Zofran for nausea.           Review of patient's allergies indicates:   Allergen Reactions    Compazine [prochlorperazine edisylate] Anxiety    Fioricet [butalbital-acetaminophen-caff] Other (See Comments)     Reaction:  "Really bad yeast infections"    Reglan [metoclopramide] Rash     Past Medical History:   Diagnosis Date    Anxiety     Bladder infection     Carpal tunnel syndrome     Depression     Hypertension     Migraine headache     Pancreatitis     UTI (lower urinary tract infection)      Past Surgical History:   Procedure Laterality Date    CHOLECYSTECTOMY      CHOLECYSTECTOMY, LAPAROSCOPIC N/A " 2012    [facility] OR    JOINT REPLACEMENT      KNEE SURGERY   aug 2008    right knee    KNEE SURGERY      REDUCTION MAMMAPLASTY      Breast reduction     Family History   Problem Relation Age of Onset    Hypertension Mother     Hypertension Father     Hypertension Maternal Grandmother     Breast cancer Neg Hx     Colon cancer Neg Hx     Ovarian cancer Neg Hx      Social History     Tobacco Use    Smoking status: Former Smoker     Packs/day: 0.50     Years: 8.00     Pack years: 4.00     Types: Cigarettes     Last attempt to quit: 2018     Years since quittin.5    Smokeless tobacco: Never Used    Tobacco comment: Pt states she smokes 1 pack of cigarettes in 3 days.   Substance Use Topics    Alcohol use: No    Drug use: No     Review of Systems   Constitutional: Negative for chills and fever.   HENT: Positive for congestion. Negative for dental problem, ear pain, rhinorrhea, sore throat and trouble swallowing.         (+) jaw pain     Eyes: Positive for visual disturbance. Negative for redness.   Respiratory: Positive for shortness of breath. Negative for stridor.    Cardiovascular: Negative for chest pain.   Gastrointestinal: Positive for nausea and vomiting.   Musculoskeletal: Negative for back pain, neck pain and neck stiffness.   Neurological: Positive for light-headedness. Negative for dizziness, speech difficulty and headaches.   Psychiatric/Behavioral: Negative for confusion.       Physical Exam     Initial Vitals [193]   BP Pulse Resp Temp SpO2   (!) 145/91 103 18 99.1 °F (37.3 °C) 97 %      MAP       --         Physical Exam    Nursing note and vitals reviewed.  Constitutional: She appears well-developed and well-nourished.   HENT:   Head: Normocephalic.   Right Ear: External ear normal.   Left Ear: External ear normal.   Nose: Nose normal.   Mouth/Throat: Uvula is midline, oropharynx is clear and moist and mucous membranes are normal. No  oropharyngeal exudate, posterior oropharyngeal edema or posterior oropharyngeal erythema.       No trismus.  Tolerating secretions.  No submandibular swelling or tenderness. No tenderness to palpation mouth lower.  No protrusion of tongue.   Eyes: Conjunctivae are normal.   Cardiovascular: Normal rate and regular rhythm. Exam reveals no gallop and no friction rub.    No murmur heard.  Pulmonary/Chest: Breath sounds normal. She has no wheezes. She has no rhonchi. She has no rales.   Abdominal: Soft. Bowel sounds are normal. She exhibits no distension. There is no tenderness. There is no rebound, no guarding, no tenderness at McBurney's point and negative Marrero's sign.   Musculoskeletal: Normal range of motion.   Lymphadenopathy:     She has no cervical adenopathy.   Neurological: She is alert. She has normal strength. No cranial nerve deficit or sensory deficit. Coordination and gait normal.   Skin: Skin is warm and dry.   Psychiatric: She has a normal mood and affect.         ED Course   Procedures  Labs Reviewed   POCT URINE PREGNANCY          Imaging Results    None          Medical Decision Making:   Initial Assessment:   34-year-old female with history of hypertension, depression, anxiety presenting for evaluation of right lower facial swelling x1 day and left lower facial swelling that began today.  Patient evaluated this facility yesterday and prescribed clindamycin for facial cellulitis.  She has been compliant with this.  She presents today due to left-sided jaw pain and numbness.  She reports she has never had any facial swelling or these symptoms before.  However, review of chart shows that she was seen in the last 2 months at [facility] in [place] as well as [facility] and [facility] for dental pain and facial swelling.  She denies any dental pain at this time.  However, there is significant dental tenderness on the right side and the left side.  There is a drainable fluid  collection on the left side.  I ordered Bupivicaine for incision and drainage of dental abscess.  This medication 3.6 mL was accidentally given IM prior to my completion of I&D of abscess which is equivalent to about 0.18 mg. Pt denies any CP, SOB, dizziness, lightheadedness, palpitations. Vitals stable at discharge. She is feeling much better after meds PO. I was able to express yellow purulent drainage from the area without use of incision and drainage kit or anesthetic. Will have pt follow up with dental clinic in 1 day and return to ER for worsneing symptoms, difficulty breathing or swallowing or as needed.  Continue clindamycin.  Doubt Mich's angina, deep neck space infection, airway compromise at this time.  She has numerous complaints of lightheadedness, nausea, vomiting, blurry vision that resolved after medical treatment in the emergency department.  She denies any chest pain, shortness of breath, dizziness.  No focal neurologic deficits.  Doubt ACS.  I discussed this patient with Julianna Gillette who agrees with assessment and plan.                       Clinical Impression:       ICD-10-CM ICD-9-CM   1. Dental abscess K04.7 522.5   2. Facial cellulitis L03.211 682.0                                Yaima Newton PA-C  06/24/19 0314       Yaima Newton PA-C  06/24/19 0314

## 2019-06-24 NOTE — DISCHARGE INSTRUCTIONS
Continue taking Clindamycin antibiotic as prescribed. Take Ibuprofen and Tylenol for pain. Take Zofran for nausea as before and Phenergan as prescribed today. Follow up with primary care in 2 days and return to ER for worsening symptoms, difficulty breathing or swallowing, chest pain, difficulty breating or as needed.

## 2019-06-27 ENCOUNTER — HOSPITAL ENCOUNTER (EMERGENCY)
Facility: HOSPITAL | Age: 34
Discharge: HOME | End: 2019-06-27
Attending: EMERGENCY MEDICINE
Payer: MEDICAID

## 2019-06-27 VITALS
BODY MASS INDEX: 34.96 KG/M2 | HEART RATE: 88 BPM | DIASTOLIC BLOOD PRESSURE: 101 MMHG | HEIGHT: 62 IN | WEIGHT: 190 LBS | SYSTOLIC BLOOD PRESSURE: 157 MMHG | OXYGEN SATURATION: 100 % | RESPIRATION RATE: 20 BRPM | TEMPERATURE: 99 F

## 2019-06-27 DIAGNOSIS — L50.9 URTICARIA: Primary | ICD-10-CM

## 2019-06-27 DIAGNOSIS — T78.40XA ALLERGIC REACTION: ICD-10-CM

## 2019-06-27 LAB
ALBUMIN SERPL BCP-MCNC: 3.3 G/DL (ref 3.5–5.2)
ALP SERPL-CCNC: 59 U/L (ref 55–135)
ALT SERPL W/O P-5'-P-CCNC: 20 U/L (ref 10–44)
ANION GAP SERPL CALC-SCNC: 12 MMOL/L (ref 8–16)
AST SERPL-CCNC: 24 U/L (ref 10–40)
B-HCG UR QL: NEGATIVE
BACTERIA #/AREA URNS HPF: ABNORMAL /HPF
BASOPHILS # BLD AUTO: 0 K/UL (ref 0–0.2)
BASOPHILS NFR BLD: 0 % (ref 0–1.9)
BILIRUB SERPL-MCNC: 0.3 MG/DL (ref 0.1–1)
BILIRUB UR QL STRIP: NEGATIVE
BUN SERPL-MCNC: 12 MG/DL (ref 6–20)
CALCIUM SERPL-MCNC: 9.2 MG/DL (ref 8.7–10.5)
CHLORIDE SERPL-SCNC: 104 MMOL/L (ref 95–110)
CLARITY UR: ABNORMAL
CO2 SERPL-SCNC: 20 MMOL/L (ref 23–29)
COLOR UR: YELLOW
CREAT SERPL-MCNC: 0.8 MG/DL (ref 0.5–1.4)
CTP QC/QA: YES
DIFFERENTIAL METHOD: ABNORMAL
EOSINOPHIL # BLD AUTO: 0 K/UL (ref 0–0.5)
EOSINOPHIL NFR BLD: 0.2 % (ref 0–8)
ERYTHROCYTE [DISTWIDTH] IN BLOOD BY AUTOMATED COUNT: 12.5 % (ref 11.5–14.5)
EST. GFR  (AFRICAN AMERICAN): >60 ML/MIN/1.73 M^2
EST. GFR  (NON AFRICAN AMERICAN): >60 ML/MIN/1.73 M^2
GLUCOSE SERPL-MCNC: 185 MG/DL (ref 70–110)
GLUCOSE UR QL STRIP: NEGATIVE
HCT VFR BLD AUTO: 37.3 % (ref 37–48.5)
HGB BLD-MCNC: 12.5 G/DL (ref 12–16)
HGB UR QL STRIP: ABNORMAL
KETONES UR QL STRIP: NEGATIVE
LEUKOCYTE ESTERASE UR QL STRIP: ABNORMAL
LYMPHOCYTES # BLD AUTO: 1.5 K/UL (ref 1–4.8)
LYMPHOCYTES NFR BLD: 12.4 % (ref 18–48)
MCH RBC QN AUTO: 30 PG (ref 27–31)
MCHC RBC AUTO-ENTMCNC: 33.5 G/DL (ref 32–36)
MCV RBC AUTO: 89 FL (ref 82–98)
MICROSCOPIC COMMENT: ABNORMAL
MONOCYTES # BLD AUTO: 0.1 K/UL (ref 0.3–1)
MONOCYTES NFR BLD: 1.1 % (ref 4–15)
NEUTROPHILS # BLD AUTO: 10.4 K/UL (ref 1.8–7.7)
NEUTROPHILS NFR BLD: 86.8 % (ref 38–73)
NITRITE UR QL STRIP: NEGATIVE
PH UR STRIP: 6 [PH] (ref 5–8)
PLATELET # BLD AUTO: 265 K/UL (ref 150–350)
PMV BLD AUTO: 8.9 FL (ref 9.2–12.9)
POTASSIUM SERPL-SCNC: 3.8 MMOL/L (ref 3.5–5.1)
PROT SERPL-MCNC: 6.6 G/DL (ref 6–8.4)
PROT UR QL STRIP: NEGATIVE
RBC # BLD AUTO: 4.17 M/UL (ref 4–5.4)
RBC #/AREA URNS HPF: 2 /HPF (ref 0–4)
SODIUM SERPL-SCNC: 136 MMOL/L (ref 136–145)
SP GR UR STRIP: 1.01 (ref 1–1.03)
SQUAMOUS #/AREA URNS HPF: 25 /HPF
URN SPEC COLLECT METH UR: ABNORMAL
UROBILINOGEN UR STRIP-ACNC: NEGATIVE EU/DL
WBC # BLD AUTO: 11.99 K/UL (ref 3.9–12.7)
WBC #/AREA URNS HPF: 2 /HPF (ref 0–5)
YEAST URNS QL MICRO: ABNORMAL

## 2019-06-27 PROCEDURE — 25000003 PHARM REV CODE 250: Performed by: PHYSICIAN ASSISTANT

## 2019-06-27 PROCEDURE — 96361 HYDRATE IV INFUSION ADD-ON: CPT

## 2019-06-27 PROCEDURE — 63600175 PHARM REV CODE 636 W HCPCS: Performed by: EMERGENCY MEDICINE

## 2019-06-27 PROCEDURE — 96375 TX/PRO/DX INJ NEW DRUG ADDON: CPT

## 2019-06-27 PROCEDURE — 81025 URINE PREGNANCY TEST: CPT | Performed by: EMERGENCY MEDICINE

## 2019-06-27 PROCEDURE — 99284 EMERGENCY DEPT VISIT MOD MDM: CPT | Mod: 25

## 2019-06-27 PROCEDURE — 85025 COMPLETE CBC W/AUTO DIFF WBC: CPT

## 2019-06-27 PROCEDURE — 25000003 PHARM REV CODE 250: Performed by: EMERGENCY MEDICINE

## 2019-06-27 PROCEDURE — 96374 THER/PROPH/DIAG INJ IV PUSH: CPT

## 2019-06-27 PROCEDURE — 81000 URINALYSIS NONAUTO W/SCOPE: CPT

## 2019-06-27 PROCEDURE — S0028 INJECTION, FAMOTIDINE, 20 MG: HCPCS | Performed by: PHYSICIAN ASSISTANT

## 2019-06-27 PROCEDURE — 80053 COMPREHEN METABOLIC PANEL: CPT

## 2019-06-27 RX ORDER — DEXAMETHASONE SODIUM PHOSPHATE 4 MG/ML
8 INJECTION, SOLUTION INTRA-ARTICULAR; INTRALESIONAL; INTRAMUSCULAR; INTRAVENOUS; SOFT TISSUE
Status: COMPLETED | OUTPATIENT
Start: 2019-06-27 | End: 2019-06-27

## 2019-06-27 RX ORDER — KETOROLAC TROMETHAMINE 30 MG/ML
15 INJECTION, SOLUTION INTRAMUSCULAR; INTRAVENOUS
Status: COMPLETED | OUTPATIENT
Start: 2019-06-27 | End: 2019-06-27

## 2019-06-27 RX ORDER — FAMOTIDINE 20 MG/1
40 TABLET, FILM COATED ORAL DAILY
Qty: 10 TABLET | Refills: 0 | Status: SHIPPED | OUTPATIENT
Start: 2019-06-27 | End: 2019-07-03 | Stop reason: SDUPTHER

## 2019-06-27 RX ORDER — PREDNISONE 20 MG/1
60 TABLET ORAL
Status: COMPLETED | OUTPATIENT
Start: 2019-06-27 | End: 2019-06-27

## 2019-06-27 RX ORDER — FAMOTIDINE 10 MG/ML
40 INJECTION INTRAVENOUS
Status: COMPLETED | OUTPATIENT
Start: 2019-06-27 | End: 2019-06-27

## 2019-06-27 RX ORDER — PREDNISONE 20 MG/1
40 TABLET ORAL DAILY
Qty: 10 TABLET | Refills: 0 | Status: SHIPPED | OUTPATIENT
Start: 2019-06-27 | End: 2019-07-02

## 2019-06-27 RX ORDER — DIPHENHYDRAMINE HCL 50 MG
50 CAPSULE ORAL
Status: DISCONTINUED | OUTPATIENT
Start: 2019-06-27 | End: 2019-06-27

## 2019-06-27 RX ORDER — METHYLPREDNISOLONE SOD SUCC 125 MG
125 VIAL (EA) INJECTION
Status: DISCONTINUED | OUTPATIENT
Start: 2019-06-27 | End: 2019-06-27

## 2019-06-27 RX ORDER — ALPRAZOLAM 0.5 MG/1
0.5 TABLET ORAL
Status: COMPLETED | OUTPATIENT
Start: 2019-06-27 | End: 2019-06-27

## 2019-06-27 RX ORDER — DIPHENHYDRAMINE HCL 50 MG
50 CAPSULE ORAL EVERY 6 HOURS PRN
Qty: 20 CAPSULE | Refills: 0 | Status: SHIPPED | OUTPATIENT
Start: 2019-06-27 | End: 2019-07-03 | Stop reason: SDUPTHER

## 2019-06-27 RX ADMIN — FAMOTIDINE 40 MG: 10 INJECTION INTRAVENOUS at 04:06

## 2019-06-27 RX ADMIN — PREDNISONE 60 MG: 20 TABLET ORAL at 06:06

## 2019-06-27 RX ADMIN — ALPRAZOLAM 0.5 MG: 0.5 TABLET ORAL at 07:06

## 2019-06-27 RX ADMIN — KETOROLAC TROMETHAMINE 15 MG: 30 INJECTION, SOLUTION INTRAMUSCULAR at 04:06

## 2019-06-27 RX ADMIN — DEXAMETHASONE SODIUM PHOSPHATE 8 MG: 4 INJECTION, SOLUTION INTRAMUSCULAR; INTRAVENOUS at 07:06

## 2019-06-27 RX ADMIN — SODIUM CHLORIDE, SODIUM LACTATE, POTASSIUM CHLORIDE, AND CALCIUM CHLORIDE 1000 ML: .6; .31; .03; .02 INJECTION, SOLUTION INTRAVENOUS at 04:06

## 2019-06-27 NOTE — ED TRIAGE NOTES
Pt arrived due to a possible allergic reaction that has been present for the past 5 days but worsened today. Pt reports throat swelling and irritation. Pt also reports chest tightness, generalized hives on her skin, and skin irritation

## 2019-06-27 NOTE — ED PROVIDER NOTES
"Encounter Date: 6/27/2019       History     Chief Complaint   Patient presents with    Allergic Reaction     Hives x 3 days.  Started clindamycin on Saturday.     CC: Allergic Reaction     HPI: This 34 y.o F with a hx of Anxiety, HTN and Pancreatitis presents to the ED via EMS for emergent evaluation of a gradually worsening rash secondary to an allergic reaction x3 days. The pt was Rx'ed Clindamycin 6/22/19, and began experiencing a mild rash 6/24/19. She attempted tx with an unspicified cream and benadryl with no relief. Her rash has worsened since onset with associated itching and burning sensation. Today, she began experiencing throat itching and burning sensation, bilateral hand swelling and nausea which prompted her to call EMS. The pt has been Rx'ed Clindamycin one time previously, but denies any previous allergic reactions to clindamycin. EMS administered epinephrine, Solumedrol and Benadryl 50mg with slight relief.     The history is provided by the patient. No  was used.     Review of patient's allergies indicates:   Allergen Reactions    Clindamycin Hives and Itching    Compazine [prochlorperazine edisylate] Anxiety    Fioricet [butalbital-acetaminophen-caff] Other (See Comments)     Reaction:  "Really bad yeast infections"    Reglan [metoclopramide] Rash     Past Medical History:   Diagnosis Date    Anxiety     Bladder infection     Carpal tunnel syndrome     Depression     Hypertension     Migraine headache     Pancreatitis     UTI (lower urinary tract infection)      Past Surgical History:   Procedure Laterality Date    CHOLECYSTECTOMY      CHOLECYSTECTOMY, LAPAROSCOPIC N/A 12/21/2012    Performed by [facility] at [facility] OR    JOINT REPLACEMENT      KNEE SURGERY   aug 2008    right knee    KNEE SURGERY      REDUCTION MAMMAPLASTY      Breast reduction     Family History   Problem Relation Age of Onset    Hypertension Mother     Hypertension Father     " Hypertension Maternal Grandmother     Breast cancer Neg Hx     Colon cancer Neg Hx     Ovarian cancer Neg Hx      Social History     Tobacco Use    Smoking status: Former Smoker     Packs/day: 0.50     Years: 8.00     Pack years: 4.00     Types: Cigarettes     Last attempt to quit: 2018     Years since quittin.5    Smokeless tobacco: Never Used    Tobacco comment: Pt states she smokes 1 pack of cigarettes in 3 days.   Substance Use Topics    Alcohol use: No    Drug use: No     Review of Systems   Constitutional: Negative for chills and fever.   HENT: Negative for congestion, ear pain and rhinorrhea.         (+) throat itching and burning sensation   Eyes: Negative for pain and visual disturbance.   Respiratory: Negative for cough and shortness of breath.    Cardiovascular: Negative for chest pain.   Gastrointestinal: Positive for nausea. Negative for abdominal pain, diarrhea and vomiting.   Genitourinary: Negative for dysuria.   Musculoskeletal: Negative for back pain and neck pain.        (+) bilateral hands swelling   Skin: Positive for rash.   Neurological: Negative for headaches.       Physical Exam     Initial Vitals [19 1621]   BP Pulse Resp Temp SpO2   (!) 159/100 104 18 99.1 °F (37.3 °C) 95 %      MAP       --         Physical Exam    Nursing note and vitals reviewed.  Constitutional: She is not diaphoretic. No distress.   Phonating normally.   HENT:   Head: Normocephalic and atraumatic.   Mouth/Throat: Oropharynx is clear and moist. No posterior oropharyngeal edema or posterior oropharyngeal erythema.   Eyes: EOM are normal. Pupils are equal, round, and reactive to light. No scleral icterus.   Neck: Normal range of motion. Neck supple. No JVD present.   Cardiovascular: Normal rate, regular rhythm and intact distal pulses.   Pulmonary/Chest: Breath sounds normal. No stridor. No respiratory distress.   Abdominal: Soft. Bowel sounds are normal. She exhibits no distension. There is no  tenderness.   Musculoskeletal: Normal range of motion. She exhibits no edema or tenderness.   Neurological: She is alert and oriented to person, place, and time. She has normal strength. No cranial nerve deficit or sensory deficit.   Skin: Skin is warm and dry.   Diffuse urticaria to upper back and trunk.   Psychiatric: She has a normal mood and affect.         ED Course   Procedures  Labs Reviewed   CBC W/ AUTO DIFFERENTIAL - Abnormal; Notable for the following components:       Result Value    MPV 8.9 (*)     Gran # (ANC) 10.4 (*)     Mono # 0.1 (*)     Gran% 86.8 (*)     Lymph% 12.4 (*)     Mono% 1.1 (*)     All other components within normal limits   COMPREHENSIVE METABOLIC PANEL - Abnormal; Notable for the following components:    CO2 20 (*)     Glucose 185 (*)     Albumin 3.3 (*)     All other components within normal limits   URINALYSIS - Abnormal; Notable for the following components:    Appearance, UA Cloudy (*)     Occult Blood UA 1+ (*)     Leukocytes, UA 1+ (*)     All other components within normal limits   URINALYSIS MICROSCOPIC - Abnormal; Notable for the following components:    Bacteria Few (*)     Yeast, UA Rare (*)     All other components within normal limits   POCT URINE PREGNANCY          Imaging Results          X-Ray Chest 1 View (Final result)  Result time 06/27/19 17:19:14    Final result by Deisi Mckeon MD (06/27/19 17:19:14)                 Impression:      Unremarkable one view of the chest.      Electronically signed by: Deisi Mckeon  Date:    06/27/2019  Time:    17:19             Narrative:    EXAMINATION:  CHEST ONE VIEW    CLINICAL HISTORY:  Allergy, unspecified, initial encounter    TECHNIQUE:  One view of the chest.    COMPARISON:  11/15/2016    FINDINGS:  The cardiac silhouette is within normal limits.   There is no focal consolidation, pneumothorax, or pleural effusion.                                 Medical Decision Making:   History:   Old Medical Records: I decided  to obtain old medical records.  Initial Assessment:   This is an emergent evaluation of a 34-year-old woman presents to the emergency department today secondary to diffuse urticaria.   Differential Diagnosis:   Allergic reaction, angioedema, contact dermatitis, amongst others  Clinical Tests:   Lab Tests: Ordered and Reviewed  ED Management:  On physical examination, patient was in no acute distress.  She had a diffuse urticarial rash over her legs, arms, trunk.  There were no lesions on her face.  Her posterior oropharynx was without edema, clear, and moist. She was phonating normally.  She was mildly tremulous.  She did receive epinephrine from the paramedics.  She was additionally given Solu-Medrol and Benadryl.  I have given her Pepcid while in the emergency department.  I will continue to monitor.    Elayne Young MD  1624 PM  6/27/2019    Patient was treated for leg pain in the emergency department with ketorolac.  She is additionally given IV fluids in the emergency department.  On re-evaluation, patient's urticaria appears to be improving, however she continues to have significant lesions. I will treat her with prednisone at this time and monitor. Airway remains patent.     Elayne Young MD  1753 PM  6/27/2019    Reevaluation: patient's lesions have faded significantly. She continues to have scattered wheals to distal forearms and legs however truncal lesions have improved. She requested Xanax for her anxiety, which I provided.     Elayne Young MD  1916 PM  6/27/2019    Patient appears much improved. I will discharge her to home on a burst of prednisone, benadryl, and pepcid. I will have her discontinue her Clindamycin. I will have her follow with her PCP next week for a reevaluation.     Elayne Young MD  8:08 PM  6/27/2019                                            Clinical Impression:       ICD-10-CM ICD-9-CM   1. Urticaria L50.9 708.9   2. Allergic reaction T78.40XA 995.3                                Elayne MORALES  MD [place]  06/27/19 2023

## 2019-07-03 ENCOUNTER — HOSPITAL ENCOUNTER (EMERGENCY)
Facility: HOSPITAL | Age: 34
Discharge: HOME | End: 2019-07-03
Attending: EMERGENCY MEDICINE
Payer: MEDICAID

## 2019-07-03 VITALS
OXYGEN SATURATION: 100 % | HEIGHT: 62 IN | SYSTOLIC BLOOD PRESSURE: 164 MMHG | HEART RATE: 80 BPM | RESPIRATION RATE: 17 BRPM | DIASTOLIC BLOOD PRESSURE: 85 MMHG | BODY MASS INDEX: 34.96 KG/M2 | TEMPERATURE: 99 F | WEIGHT: 190 LBS

## 2019-07-03 DIAGNOSIS — T78.40XD ALLERGIC REACTION, SUBSEQUENT ENCOUNTER: ICD-10-CM

## 2019-07-03 DIAGNOSIS — L50.9 HIVES: Primary | ICD-10-CM

## 2019-07-03 LAB
B-HCG UR QL: NEGATIVE
CTP QC/QA: YES

## 2019-07-03 PROCEDURE — S0028 INJECTION, FAMOTIDINE, 20 MG: HCPCS | Performed by: EMERGENCY MEDICINE

## 2019-07-03 PROCEDURE — 99284 EMERGENCY DEPT VISIT MOD MDM: CPT | Mod: 25

## 2019-07-03 PROCEDURE — 81025 URINE PREGNANCY TEST: CPT | Performed by: EMERGENCY MEDICINE

## 2019-07-03 PROCEDURE — 25000003 PHARM REV CODE 250: Performed by: EMERGENCY MEDICINE

## 2019-07-03 PROCEDURE — 96375 TX/PRO/DX INJ NEW DRUG ADDON: CPT | Mod: 59

## 2019-07-03 PROCEDURE — 96374 THER/PROPH/DIAG INJ IV PUSH: CPT

## 2019-07-03 PROCEDURE — 63600175 PHARM REV CODE 636 W HCPCS: Performed by: EMERGENCY MEDICINE

## 2019-07-03 RX ORDER — FAMOTIDINE 10 MG/ML
40 INJECTION INTRAVENOUS
Status: COMPLETED | OUTPATIENT
Start: 2019-07-03 | End: 2019-07-03

## 2019-07-03 RX ORDER — DIPHENHYDRAMINE HYDROCHLORIDE 50 MG/ML
25 INJECTION INTRAMUSCULAR; INTRAVENOUS
Status: COMPLETED | OUTPATIENT
Start: 2019-07-03 | End: 2019-07-03

## 2019-07-03 RX ORDER — HYDROXYZINE PAMOATE 25 MG/1
25 CAPSULE ORAL
Status: COMPLETED | OUTPATIENT
Start: 2019-07-03 | End: 2019-07-03

## 2019-07-03 RX ORDER — PREDNISONE 10 MG/1
10 TABLET ORAL DAILY
Qty: 21 TABLET | Refills: 0 | Status: SHIPPED | OUTPATIENT
Start: 2019-07-03 | End: 2019-07-13

## 2019-07-03 RX ORDER — EPINEPHRINE 0.3 MG/.3ML
1 INJECTION SUBCUTANEOUS
Qty: 2 DEVICE | Refills: 1 | Status: SHIPPED | OUTPATIENT
Start: 2019-07-03 | End: 2020-07-02

## 2019-07-03 RX ORDER — FAMOTIDINE 20 MG/1
20 TABLET, FILM COATED ORAL 2 TIMES DAILY
Qty: 40 TABLET | Refills: 0 | Status: SHIPPED | OUTPATIENT
Start: 2019-07-03 | End: 2019-07-23

## 2019-07-03 RX ORDER — DIPHENHYDRAMINE HCL 50 MG
50 CAPSULE ORAL EVERY 6 HOURS PRN
Qty: 20 CAPSULE | Refills: 0 | Status: SHIPPED | OUTPATIENT
Start: 2019-07-03 | End: 2019-07-09

## 2019-07-03 RX ADMIN — DIPHENHYDRAMINE HYDROCHLORIDE 25 MG: 50 INJECTION INTRAMUSCULAR; INTRAVENOUS at 07:07

## 2019-07-03 RX ADMIN — FAMOTIDINE 40 MG: 10 INJECTION INTRAVENOUS at 06:07

## 2019-07-03 RX ADMIN — HYDROXYZINE PAMOATE 25 MG: 25 CAPSULE ORAL at 06:07

## 2019-07-03 NOTE — DISCHARGE INSTRUCTIONS
Please call today to schedule appointment with allergist.  Please take medications as prescribed.  Return for new or worsening symptoms such as swelling in your mouth or throat, shortness of breath, weakness, vomiting, wheezing.

## 2019-07-03 NOTE — ED PROVIDER NOTES
"Encounter Date: 7/3/2019    SCRIBE #1 NOTE: I, Gómez Stephenson, am scribing for, and in the presence of,  Rigo Landin Jr., MD. I have scribed the following portions of the note - Other sections scribed: HPI, ROS.       History     Chief Complaint   Patient presents with    Allergic Reaction     seen on the 27th and given clindamycin and had allergic reaction at 2200 last night pt began having hives on entire body, she denies taking clindamycin again, given 50mg benadrly, 125 solumedrol and 0.3mg Epi with EMS      CC: Allergic Reaction     HPI: This 34 y.o F with a hx of anxiety, pancreatitis, HTN and migraine headaches presents to the ED via EMS c/o acute onset of a gradually worsening urticaria with associated itching and burning sensation since last night. She also reports SOB this AM. She was last seen in this ED for a similar reaction 6/27. The pt suspected her previous reaction was secondary to Clindamycin, but she discontinued use several days ago. She was Rx'ed a 5 day course of Pepcid and Prednisone which she finished 7/1. No new environmental changes or food changes. She denies oral swelling, trouble swallowing, fever, chills, diaphoresis, nausea, emesis, diarrhea, abdominal pain, chest pain, dysuria, difficulty urinating, SOB and rash. EMS administered 50mg Benadryl. 125mg Solumedrol and )/3mg EPI with no relief.     The history is provided by the patient. No  was used.     Review of patient's allergies indicates:   Allergen Reactions    Clindamycin Hives and Itching    Compazine [prochlorperazine edisylate] Anxiety    Fioricet [butalbital-acetaminophen-caff] Hives and Other (See Comments)     Reaction:  "Really bad yeast infections"    Reglan [metoclopramide] Hives and Rash     Past Medical History:   Diagnosis Date    Anxiety     Bladder infection     Carpal tunnel syndrome     Depression     Hypertension     Migraine headache     Pancreatitis     UTI (lower urinary " tract infection)      Past Surgical History:   Procedure Laterality Date    CHOLECYSTECTOMY      CHOLECYSTECTOMY, LAPAROSCOPIC N/A 2012    Performed [facility] at [facility] OR    KNEE SURGERY   aug 2008    right knee    KNEE SURGERY      REDUCTION MAMMAPLASTY      Breast reduction     Family History   Problem Relation Age of Onset    Hypertension Mother     Hypertension Father     Hypertension Maternal Grandmother     Breast cancer Neg Hx     Colon cancer Neg Hx     Ovarian cancer Neg Hx      Social History     Tobacco Use    Smoking status: Current Some Day Smoker     Packs/day: 0.50     Years: 8.00     Pack years: 4.00     Types: Cigarettes     Last attempt to quit: 2018     Years since quittin.5    Smokeless tobacco: Never Used    Tobacco comment: Pt states she smokes 1 pack of cigarettes in 3 days.   Substance Use Topics    Alcohol use: No    Drug use: No     Review of Systems   Constitutional: Negative for chills, diaphoresis and fever.   HENT: Negative for rhinorrhea and sore throat.    Eyes: Negative for redness.   Respiratory: Positive for shortness of breath. Negative for cough.    Cardiovascular: Negative for chest pain.   Gastrointestinal: Negative for abdominal pain, diarrhea, nausea and vomiting.   Genitourinary: Negative for dysuria, frequency and urgency.   Musculoskeletal: Negative for back pain and neck pain.   Skin: Positive for rash.   Psychiatric/Behavioral: The patient is not nervous/anxious.        Physical Exam     Initial Vitals [19 0609]   BP Pulse Resp Temp SpO2   (!) 184/126 92 20 99 °F (37.2 °C) 99 %      MAP       --         Physical Exam    Nursing note and vitals reviewed.  Constitutional: She appears well-developed and well-nourished. She is not diaphoretic. No distress.   HENT:   Head: Normocephalic and atraumatic.   Right Ear: External ear normal.   Left Ear: External ear normal.   Nose: Nose normal.   Mouth/Throat: Oropharynx is clear and  moist.   No erythema or edema noted in the oropharynx.  Specifically, no lingual edema or sublingual edema.  Uvula is midline.   Eyes: Conjunctivae and EOM are normal. Pupils are equal, round, and reactive to light. Right eye exhibits no discharge. Left eye exhibits no discharge. No scleral icterus.   Neck: Normal range of motion. Neck supple.   Neck is supple without swelling.   Cardiovascular: Normal rate, regular rhythm, normal heart sounds and intact distal pulses.   No murmur heard.  Pulmonary/Chest: Breath sounds normal. No respiratory distress. She has no wheezes. She has no rhonchi. She has no rales.   No respiratory distress.  No wheezes.   Abdominal: Soft. Bowel sounds are normal. She exhibits no distension and no mass. There is no tenderness. There is no rebound and no guarding.   Musculoskeletal: Normal range of motion. She exhibits no edema or tenderness.   Neurological: She is alert and oriented to person, place, and time. She has normal strength. No cranial nerve deficit or sensory deficit.   Skin: Skin is warm and dry. Rash noted. No erythema. No pallor.   Patient has multiple wheals noted on the upper extremities, anterior torso.  There is small to on the posterior torso though less so.  There do not appear to be any on the abdomen or lower extremities.   Psychiatric: She has a normal mood and affect. Her behavior is normal. Judgment and thought content normal.         ED Course   Procedures  Labs Reviewed   POCT URINE PREGNANCY          Imaging Results    None          Medical Decision Making:   ED Management:  This is the emergent evaluation of a 34-year-old female presents emergency department with urticaria and pruritus.  Differential diagnosis at the time of initial evaluation included, but was not limited to:  Localized allergic reaction, anaphylaxis.  It is unclear what the etiology of this patient's reaction is.  She did state that she originally broke out in hives several days ago when she  was on clindamycin but is not taking this for several days.  She states that she has no history of breaking out in hives in the past.  She received epinephrine, Solu-Medrol, and Benadryl from EMS.  I gave her another dose of Benadryl in the emergency department as well as hydroxyzine as she was complaining of pruritus.  During her observation.  The emergency department, she did not develop any evidence of anaphylaxis or anaphylactic shock.  I will discharge her with a steroid taper, continued use of Pepcid and Benadryl, and EpiPen as needed for anaphylaxis.  I have also referred her to allergy clinic.  She was advised to return for new or worsening symptoms, such as vomiting, weakness, passing out, wheezing, shortness of breath, or any other concerning symptoms, none of which she has today.               Scribe Attestation:   Scribe #1: I performed the above scribed service and the documentation accurately describes the services I performed. I attest to the accuracy of the note.    Attending Attestation:           Physician Attestation for Scribe:  Physician Attestation Statement for Scribe #1: I, Rigo Landin Jr., MD, reviewed documentation, as scribed by Gómez Stephenson in my presence, and it is both accurate and complete.                    Clinical Impression:       ICD-10-CM ICD-9-CM   1. Hives L50.9 708.9   2. Allergic reaction, subsequent encounter T78.40XD V58.89     995.3                                Rigo Landin Jr., MD  07/03/19 [id number]

## 2019-07-03 NOTE — ED TRIAGE NOTES
Arrived via EMS with allergic reaction. Patient denies any new interactions with environmental or food. Patient was seen last week for the same issue and was told that she was allergic to clindamycin. Patient had finished her prednisone and pepcid dosage July 1. En route she was given 0.3mg Epi, 125ml solumedrol, and 50mg benadryl. Patient's urticaria appears to be increasing in amount and color. Denies SOB.

## 2020-01-25 ENCOUNTER — HOSPITAL ENCOUNTER (EMERGENCY)
Facility: HOSPITAL | Age: 35
Discharge: HOME | End: 2020-01-25
Attending: EMERGENCY MEDICINE
Payer: MEDICAID

## 2020-01-25 VITALS
RESPIRATION RATE: 19 BRPM | BODY MASS INDEX: 34.04 KG/M2 | WEIGHT: 185 LBS | TEMPERATURE: 98 F | DIASTOLIC BLOOD PRESSURE: 87 MMHG | HEART RATE: 88 BPM | OXYGEN SATURATION: 98 % | SYSTOLIC BLOOD PRESSURE: 151 MMHG | HEIGHT: 62 IN

## 2020-01-25 DIAGNOSIS — R00.0 TACHYCARDIA: ICD-10-CM

## 2020-01-25 DIAGNOSIS — N39.0 URINARY TRACT INFECTION WITHOUT HEMATURIA, SITE UNSPECIFIED: ICD-10-CM

## 2020-01-25 DIAGNOSIS — R05.9 COUGH: ICD-10-CM

## 2020-01-25 DIAGNOSIS — J40 BRONCHITIS: Primary | ICD-10-CM

## 2020-01-25 LAB
ALBUMIN SERPL BCP-MCNC: 3.6 G/DL (ref 3.5–5.2)
ALP SERPL-CCNC: 61 U/L (ref 55–135)
ALT SERPL W/O P-5'-P-CCNC: 53 U/L (ref 10–44)
ANION GAP SERPL CALC-SCNC: 12 MMOL/L (ref 8–16)
AST SERPL-CCNC: 58 U/L (ref 10–40)
B-HCG UR QL: NEGATIVE
BACTERIA #/AREA URNS HPF: ABNORMAL /HPF
BASOPHILS # BLD AUTO: 0.05 K/UL (ref 0–0.2)
BASOPHILS NFR BLD: 0.4 % (ref 0–1.9)
BILIRUB SERPL-MCNC: 0.3 MG/DL (ref 0.1–1)
BILIRUB UR QL STRIP: NEGATIVE
BNP SERPL-MCNC: <10 PG/ML (ref 0–99)
BUN SERPL-MCNC: 15 MG/DL (ref 6–20)
CALCIUM SERPL-MCNC: 9.4 MG/DL (ref 8.7–10.5)
CHLORIDE SERPL-SCNC: 105 MMOL/L (ref 95–110)
CLARITY UR: ABNORMAL
CO2 SERPL-SCNC: 21 MMOL/L (ref 23–29)
COLOR UR: YELLOW
CREAT SERPL-MCNC: 0.7 MG/DL (ref 0.5–1.4)
CTP QC/QA: YES
CTP QC/QA: YES
DIFFERENTIAL METHOD: ABNORMAL
EOSINOPHIL # BLD AUTO: 0.3 K/UL (ref 0–0.5)
EOSINOPHIL NFR BLD: 2.4 % (ref 0–8)
ERYTHROCYTE [DISTWIDTH] IN BLOOD BY AUTOMATED COUNT: 12.7 % (ref 11.5–14.5)
EST. GFR  (AFRICAN AMERICAN): >60 ML/MIN/1.73 M^2
EST. GFR  (NON AFRICAN AMERICAN): >60 ML/MIN/1.73 M^2
GLUCOSE SERPL-MCNC: 154 MG/DL (ref 70–110)
GLUCOSE UR QL STRIP: NEGATIVE
HCT VFR BLD AUTO: 42.1 % (ref 37–48.5)
HGB BLD-MCNC: 14.1 G/DL (ref 12–16)
HGB UR QL STRIP: ABNORMAL
HYALINE CASTS #/AREA URNS LPF: 0 /LPF
IMM GRANULOCYTES # BLD AUTO: 0.12 K/UL (ref 0–0.04)
IMM GRANULOCYTES NFR BLD AUTO: 0.9 % (ref 0–0.5)
KETONES UR QL STRIP: NEGATIVE
LACTATE SERPL-SCNC: 1.8 MMOL/L (ref 0.5–2.2)
LEUKOCYTE ESTERASE UR QL STRIP: ABNORMAL
LYMPHOCYTES # BLD AUTO: 6.3 K/UL (ref 1–4.8)
LYMPHOCYTES NFR BLD: 45.6 % (ref 18–48)
MCH RBC QN AUTO: 29.9 PG (ref 27–31)
MCHC RBC AUTO-ENTMCNC: 33.5 G/DL (ref 32–36)
MCV RBC AUTO: 89 FL (ref 82–98)
MICROSCOPIC COMMENT: ABNORMAL
MONOCYTES # BLD AUTO: 0.9 K/UL (ref 0.3–1)
MONOCYTES NFR BLD: 6.4 % (ref 4–15)
NEUTROPHILS # BLD AUTO: 6.1 K/UL (ref 1.8–7.7)
NEUTROPHILS NFR BLD: 44.3 % (ref 38–73)
NITRITE UR QL STRIP: NEGATIVE
NRBC BLD-RTO: 0 /100 WBC
PH UR STRIP: 6 [PH] (ref 5–8)
PLATELET # BLD AUTO: 326 K/UL (ref 150–350)
PMV BLD AUTO: 9 FL (ref 9.2–12.9)
POC MOLECULAR INFLUENZA A AGN: NEGATIVE
POC MOLECULAR INFLUENZA B AGN: NEGATIVE
POTASSIUM SERPL-SCNC: 3.5 MMOL/L (ref 3.5–5.1)
PROT SERPL-MCNC: 7.4 G/DL (ref 6–8.4)
PROT UR QL STRIP: ABNORMAL
RBC # BLD AUTO: 4.71 M/UL (ref 4–5.4)
RBC #/AREA URNS HPF: 20 /HPF (ref 0–4)
SODIUM SERPL-SCNC: 138 MMOL/L (ref 136–145)
SP GR UR STRIP: 1.02 (ref 1–1.03)
TROPONIN I SERPL DL<=0.01 NG/ML-MCNC: <0.006 NG/ML (ref 0–0.03)
URN SPEC COLLECT METH UR: ABNORMAL
UROBILINOGEN UR STRIP-ACNC: NEGATIVE EU/DL
WBC # BLD AUTO: 13.8 K/UL (ref 3.9–12.7)
WBC #/AREA URNS HPF: 30 /HPF (ref 0–5)

## 2020-01-25 PROCEDURE — 99285 EMERGENCY DEPT VISIT HI MDM: CPT | Mod: 25

## 2020-01-25 PROCEDURE — 81025 URINE PREGNANCY TEST: CPT | Performed by: EMERGENCY MEDICINE

## 2020-01-25 PROCEDURE — 96375 TX/PRO/DX INJ NEW DRUG ADDON: CPT

## 2020-01-25 PROCEDURE — 83880 ASSAY OF NATRIURETIC PEPTIDE: CPT

## 2020-01-25 PROCEDURE — 93010 EKG 12-LEAD: ICD-10-PCS | Mod: ,,, | Performed by: INTERNAL MEDICINE

## 2020-01-25 PROCEDURE — 87086 URINE CULTURE/COLONY COUNT: CPT

## 2020-01-25 PROCEDURE — 94640 AIRWAY INHALATION TREATMENT: CPT

## 2020-01-25 PROCEDURE — 87077 CULTURE AEROBIC IDENTIFY: CPT

## 2020-01-25 PROCEDURE — 87186 SC STD MICRODIL/AGAR DIL: CPT

## 2020-01-25 PROCEDURE — 93005 ELECTROCARDIOGRAM TRACING: CPT

## 2020-01-25 PROCEDURE — 81000 URINALYSIS NONAUTO W/SCOPE: CPT

## 2020-01-25 PROCEDURE — 96365 THER/PROPH/DIAG IV INF INIT: CPT

## 2020-01-25 PROCEDURE — 87147 CULTURE TYPE IMMUNOLOGIC: CPT

## 2020-01-25 PROCEDURE — 93010 ELECTROCARDIOGRAM REPORT: CPT | Mod: ,,, | Performed by: INTERNAL MEDICINE

## 2020-01-25 PROCEDURE — 84484 ASSAY OF TROPONIN QUANT: CPT

## 2020-01-25 PROCEDURE — 83605 ASSAY OF LACTIC ACID: CPT

## 2020-01-25 PROCEDURE — 85025 COMPLETE CBC W/AUTO DIFF WBC: CPT

## 2020-01-25 PROCEDURE — 87502 INFLUENZA DNA AMP PROBE: CPT

## 2020-01-25 PROCEDURE — 25000242 PHARM REV CODE 250 ALT 637 W/ HCPCS: Performed by: EMERGENCY MEDICINE

## 2020-01-25 PROCEDURE — 80053 COMPREHEN METABOLIC PANEL: CPT

## 2020-01-25 PROCEDURE — 63600175 PHARM REV CODE 636 W HCPCS: Performed by: EMERGENCY MEDICINE

## 2020-01-25 PROCEDURE — 87040 BLOOD CULTURE FOR BACTERIA: CPT | Mod: 59

## 2020-01-25 PROCEDURE — 87088 URINE BACTERIA CULTURE: CPT

## 2020-01-25 RX ORDER — AMOXICILLIN AND CLAVULANATE POTASSIUM 875; 125 MG/1; MG/1
1 TABLET, FILM COATED ORAL 2 TIMES DAILY
Qty: 20 TABLET | Refills: 0 | Status: SHIPPED | OUTPATIENT
Start: 2020-01-25 | End: 2020-02-04

## 2020-01-25 RX ORDER — KETOROLAC TROMETHAMINE 30 MG/ML
10 INJECTION, SOLUTION INTRAMUSCULAR; INTRAVENOUS
Status: COMPLETED | OUTPATIENT
Start: 2020-01-25 | End: 2020-01-25

## 2020-01-25 RX ORDER — METHYLPREDNISOLONE SOD SUCC 125 MG
125 VIAL (EA) INJECTION
Status: COMPLETED | OUTPATIENT
Start: 2020-01-25 | End: 2020-01-25

## 2020-01-25 RX ORDER — LABETALOL HYDROCHLORIDE 5 MG/ML
10 INJECTION, SOLUTION INTRAVENOUS
Status: DISCONTINUED | OUTPATIENT
Start: 2020-01-25 | End: 2020-01-25

## 2020-01-25 RX ORDER — HYDROMORPHONE HYDROCHLORIDE 2 MG/ML
1 INJECTION, SOLUTION INTRAMUSCULAR; INTRAVENOUS; SUBCUTANEOUS
Status: COMPLETED | OUTPATIENT
Start: 2020-01-25 | End: 2020-01-25

## 2020-01-25 RX ORDER — NORGESTIMATE AND ETHINYL ESTRADIOL 7DAYSX3 LO
KIT ORAL
COMMUNITY

## 2020-01-25 RX ORDER — KETOROLAC TROMETHAMINE 10 MG/1
10 TABLET, FILM COATED ORAL EVERY 6 HOURS
COMMUNITY
End: 2023-01-25 | Stop reason: ALTCHOICE

## 2020-01-25 RX ORDER — IPRATROPIUM BROMIDE AND ALBUTEROL SULFATE 2.5; .5 MG/3ML; MG/3ML
3 SOLUTION RESPIRATORY (INHALATION)
Status: COMPLETED | OUTPATIENT
Start: 2020-01-25 | End: 2020-01-25

## 2020-01-25 RX ORDER — ALBUTEROL SULFATE 90 UG/1
2 AEROSOL, METERED RESPIRATORY (INHALATION) EVERY 4 HOURS PRN
Qty: 18 G | Refills: 1 | Status: SHIPPED | OUTPATIENT
Start: 2020-01-25 | End: 2020-02-12 | Stop reason: SDUPTHER

## 2020-01-25 RX ORDER — METHYLPREDNISOLONE 4 MG/1
TABLET ORAL
Qty: 1 PACKAGE | Refills: 0 | Status: SHIPPED | OUTPATIENT
Start: 2020-01-25 | End: 2020-02-11 | Stop reason: CLARIF

## 2020-01-25 RX ADMIN — KETOROLAC TROMETHAMINE 10 MG: 30 INJECTION, SOLUTION INTRAMUSCULAR; INTRAVENOUS at 01:01

## 2020-01-25 RX ADMIN — IPRATROPIUM BROMIDE AND ALBUTEROL SULFATE 3 ML: .5; 3 SOLUTION RESPIRATORY (INHALATION) at 11:01

## 2020-01-25 RX ADMIN — CEFTRIAXONE 1 G: 1 INJECTION, SOLUTION INTRAVENOUS at 01:01

## 2020-01-25 RX ADMIN — METHYLPREDNISOLONE SODIUM SUCCINATE 125 MG: 125 INJECTION, POWDER, FOR SOLUTION INTRAMUSCULAR; INTRAVENOUS at 01:01

## 2020-01-25 RX ADMIN — HYDROMORPHONE HYDROCHLORIDE 1 MG: 2 INJECTION, SOLUTION INTRAMUSCULAR; INTRAVENOUS; SUBCUTANEOUS at 12:01

## 2020-01-25 NOTE — ED TRIAGE NOTES
"Pt arrived to the ED due to a cough x 3 weeks without relief. Pt reports "Its hard to breath". Pt also reports sore throat    :  "

## 2020-01-25 NOTE — ED PROVIDER NOTES
"Encounter Date: 1/25/2020    SCRIBE #1 NOTE: I, Rosmery Vital, am scribing for, and in the presence of,  Javon Gillette MD. I have scribed the following portions of the note - the EKG reading. Other sections scribed: HPI, ROS, PE.       History     Chief Complaint   Patient presents with    Cough     Pt reports she has had a cough for the last 3 weeks that is only getting worse. Pt states nothing is coming up with the cough.      CC: Cough    HPI: This is a 34 y.o. female with a PMHx of hypertension, migraine, and pancreatitis who presents to the Emergency Department with a cc of a non-productive cough for three weeks. Patient reports associated shortness of breath, chest pain, post-tussive vomiting, fever, sore throat, wheezing, and a headache. She denies nausea or rhinorrhea. She also denies worsening of shortness of breath when laying flat, but does note it is worsened with exertion. Chest pain is worsened with deep breaths. Patient reports no prior history of similar symptoms. She is a current smoker, but does not drink or use drugs. Patient is allergic to Clindamycin, Compazine, Reglan, and Fioricet.    The history is provided by the patient. No  was used.     Review of patient's allergies indicates:   Allergen Reactions    Clindamycin Hives and Itching    Compazine [prochlorperazine edisylate] Anxiety    Fioricet [butalbital-acetaminophen-caff] Hives and Other (See Comments)     Reaction:  "Really bad yeast infections"    Reglan [metoclopramide] Hives and Rash     Past Medical History:   Diagnosis Date    Anxiety     Bladder infection     Carpal tunnel syndrome     Depression     Hypertension     Migraine headache     Pancreatitis     UTI (lower urinary tract infection)      Past Surgical History:   Procedure Laterality Date    CHOLECYSTECTOMY      KNEE SURGERY   aug 2008    right knee    KNEE SURGERY      REDUCTION MAMMAPLASTY      Breast reduction     Family History "   Problem Relation Age of Onset    Hypertension Mother     Hypertension Father     Hypertension Maternal Grandmother     Breast cancer Neg Hx     Colon cancer Neg Hx     Ovarian cancer Neg Hx      Social History     Tobacco Use    Smoking status: Current Some Day Smoker     Packs/day: 0.50     Years: 8.00     Pack years: 4.00     Types: Cigarettes     Last attempt to quit: 2018     Years since quittin.1    Smokeless tobacco: Never Used    Tobacco comment: Pt states she smokes 1 pack of cigarettes in 3 days.   Substance Use Topics    Alcohol use: No    Drug use: No     Review of Systems   Constitutional: Positive for fever.   HENT: Positive for sore throat. Negative for rhinorrhea.    Respiratory: Positive for cough, shortness of breath and wheezing.    Cardiovascular: Positive for chest pain.   Gastrointestinal: Positive for vomiting. Negative for nausea.   Genitourinary: Negative for dysuria.   Musculoskeletal: Negative for back pain.   Skin: Negative for rash.   Neurological: Positive for headaches. Negative for weakness.   Hematological: Does not bruise/bleed easily.       Physical Exam     Initial Vitals [20 1108]   BP Pulse Resp Temp SpO2   (!) 180/119 (!) 120 18 98.5 °F (36.9 °C) 96 %      MAP       --         Physical Exam    Nursing note and vitals reviewed.  Constitutional: She appears well-developed and well-nourished.   HENT:   Head: Normocephalic and atraumatic.   Mouth/Throat: No oropharyngeal exudate.   Mild irritation   Eyes: EOM are normal. Pupils are equal, round, and reactive to light.   Neck: Normal range of motion. Neck supple. No JVD present.   Cardiovascular: Normal rate and regular rhythm.   Pulmonary/Chest: No respiratory distress.   Mild coarse breath sounds  Inspiratory and expiratory wheezes; left greater than right   Abdominal: Soft. Bowel sounds are normal.   Musculoskeletal: Normal range of motion. She exhibits no edema.   No lower extremity edema   Neurological:  She is alert and oriented to person, place, and time. GCS score is 15. GCS eye subscore is 4. GCS verbal subscore is 5. GCS motor subscore is 6.   Skin: Skin is warm and dry. Capillary refill takes less than 2 seconds.   Psychiatric: She has a normal mood and affect. Her behavior is normal.         ED Course   Procedures  Labs Reviewed   CBC W/ AUTO DIFFERENTIAL - Abnormal; Notable for the following components:       Result Value    WBC 13.80 (*)     MPV 9.0 (*)     Immature Granulocytes 0.9 (*)     Immature Grans (Abs) 0.12 (*)     Lymph # 6.3 (*)     All other components within normal limits   COMPREHENSIVE METABOLIC PANEL - Abnormal; Notable for the following components:    CO2 21 (*)     Glucose 154 (*)     AST 58 (*)     ALT 53 (*)     All other components within normal limits   URINALYSIS, REFLEX TO URINE CULTURE - Abnormal; Notable for the following components:    Appearance, UA Hazy (*)     Protein, UA 1+ (*)     Occult Blood UA 3+ (*)     Leukocytes, UA 2+ (*)     All other components within normal limits    Narrative:     Preferred Collection Type->Urine, Clean Catch   URINALYSIS MICROSCOPIC - Abnormal; Notable for the following components:    RBC, UA 20 (*)     WBC, UA 30 (*)     Bacteria Few (*)     All other components within normal limits    Narrative:     Preferred Collection Type->Urine, Clean Catch   CULTURE, BLOOD   CULTURE, BLOOD   CULTURE, URINE   TROPONIN I   B-TYPE NATRIURETIC PEPTIDE   LACTIC ACID, PLASMA   POCT INFLUENZA A/B MOLECULAR   POCT URINE PREGNANCY     EKG Readings: (Independently Interpreted)   Previous EKG: Compared with most recent EKG Previous EKG Date: Dec. 8, 2018. Rhythm: Sinus Tachycardia. Heart Rate: 106. Ectopy: No Ectopy. Conduction: Normal. ST Segments: Normal ST Segments. T Waves: Normal.   No change from December 8, 2018       Imaging Results          X-Ray Chest PA And Lateral (Final result)  Result time 01/25/20 12:21:55    Final result by Khanh Dolan MD  (01/25/20 12:21:55)                 Impression:      1. No acute cardiopulmonary process.      Electronically signed by: Khanh Dolan MD  Date:    01/25/2020  Time:    12:21             Narrative:    EXAMINATION:  XR CHEST PA AND LATERAL    CLINICAL HISTORY:  Cough    TECHNIQUE:  PA and lateral views of the chest were performed.    COMPARISON:  06/27/2019    FINDINGS:  The cardiomediastinal silhouette is not enlarged.  There is no pleural effusion.  The trachea is midline.  The lungs are symmetrically expanded bilaterally without evidence of acute parenchymal process. No large focal consolidation seen.  There is no pneumothorax.  The osseous structures are unremarkable.                              X-Rays:   Independently Interpreted Readings:   Chest X-Ray: Normal heart size.  No infiltrates.  No acute abnormalities.      Patient feeling much improved. HA resolved. SOB resolved. Wheezing resolved. Lab work stable. CXR normal. Patient does appear to have a UTI. Will treat respiratory wheezy bronchitis and UTI with Augmentin. Prescription for medrol dose karen and albuterol. Follow up with PCP this week for recheck.           Scribe Attestation:   Scribe #1: I performed the above scribed service and the documentation accurately describes the services I performed. I attest to the accuracy of the note.                          Clinical Impression:     1. Bronchitis    2. Cough    3. Tachycardia    4. Urinary tract infection without hematuria, site unspecified               I, Javon Gillette, personally performed the services described in this documentation. All medical record entries made by the scribe were at my direction and in my presence.  I have reviewed the chart and agree that the record reflects my personal performance and is accurate and complete.               Javon Gillette MD  01/25/20 [id number]

## 2020-01-27 LAB
BACTERIA UR CULT: ABNORMAL
BACTERIA UR CULT: ABNORMAL

## 2020-01-30 LAB
BACTERIA BLD CULT: NORMAL
BACTERIA BLD CULT: NORMAL

## 2020-02-11 ENCOUNTER — HOSPITAL ENCOUNTER (EMERGENCY)
Facility: HOSPITAL | Age: 35
Discharge: HOME | End: 2020-02-12
Attending: EMERGENCY MEDICINE
Payer: MEDICAID

## 2020-02-11 DIAGNOSIS — R07.9 CHEST PAIN, UNSPECIFIED TYPE: ICD-10-CM

## 2020-02-11 DIAGNOSIS — R07.81 PLEURITIC CHEST PAIN: ICD-10-CM

## 2020-02-11 DIAGNOSIS — J06.9 VIRAL URI WITH COUGH: Primary | ICD-10-CM

## 2020-02-11 DIAGNOSIS — R06.02 SHORTNESS OF BREATH: ICD-10-CM

## 2020-02-11 LAB
ALBUMIN SERPL BCP-MCNC: 3.6 G/DL (ref 3.5–5.2)
ALP SERPL-CCNC: 69 U/L (ref 55–135)
ALT SERPL W/O P-5'-P-CCNC: 19 U/L (ref 10–44)
AMPHET+METHAMPHET UR QL: NEGATIVE
ANION GAP SERPL CALC-SCNC: 11 MMOL/L (ref 8–16)
AST SERPL-CCNC: 19 U/L (ref 10–40)
B-HCG UR QL: NEGATIVE
BARBITURATES UR QL SCN>200 NG/ML: NEGATIVE
BASOPHILS # BLD AUTO: 0.03 K/UL (ref 0–0.2)
BASOPHILS NFR BLD: 0.2 % (ref 0–1.9)
BENZODIAZ UR QL SCN>200 NG/ML: NEGATIVE
BILIRUB SERPL-MCNC: 0.2 MG/DL (ref 0.1–1)
BUN SERPL-MCNC: 9 MG/DL (ref 6–20)
BZE UR QL SCN: NEGATIVE
CALCIUM SERPL-MCNC: 9 MG/DL (ref 8.7–10.5)
CANNABINOIDS UR QL SCN: NEGATIVE
CHLORIDE SERPL-SCNC: 103 MMOL/L (ref 95–110)
CO2 SERPL-SCNC: 22 MMOL/L (ref 23–29)
CREAT SERPL-MCNC: 1 MG/DL (ref 0.5–1.4)
CREAT UR-MCNC: 106.1 MG/DL (ref 15–325)
CTP QC/QA: YES
D DIMER PPP IA.FEU-MCNC: 0.38 MG/L FEU
DIFFERENTIAL METHOD: ABNORMAL
EOSINOPHIL # BLD AUTO: 0.1 K/UL (ref 0–0.5)
EOSINOPHIL NFR BLD: 1.1 % (ref 0–8)
ERYTHROCYTE [DISTWIDTH] IN BLOOD BY AUTOMATED COUNT: 13 % (ref 11.5–14.5)
EST. GFR  (AFRICAN AMERICAN): >60 ML/MIN/1.73 M^2
EST. GFR  (NON AFRICAN AMERICAN): >60 ML/MIN/1.73 M^2
GLUCOSE SERPL-MCNC: 246 MG/DL (ref 70–110)
HCT VFR BLD AUTO: 39.3 % (ref 37–48.5)
HGB BLD-MCNC: 13.3 G/DL (ref 12–16)
IMM GRANULOCYTES # BLD AUTO: 0.08 K/UL (ref 0–0.04)
IMM GRANULOCYTES NFR BLD AUTO: 0.6 % (ref 0–0.5)
LYMPHOCYTES # BLD AUTO: 4.2 K/UL (ref 1–4.8)
LYMPHOCYTES NFR BLD: 33.3 % (ref 18–48)
MCH RBC QN AUTO: 29.6 PG (ref 27–31)
MCHC RBC AUTO-ENTMCNC: 33.8 G/DL (ref 32–36)
MCV RBC AUTO: 87 FL (ref 82–98)
METHADONE UR QL SCN>300 NG/ML: NEGATIVE
MONOCYTES # BLD AUTO: 0.9 K/UL (ref 0.3–1)
MONOCYTES NFR BLD: 6.9 % (ref 4–15)
NEUTROPHILS # BLD AUTO: 7.4 K/UL (ref 1.8–7.7)
NEUTROPHILS NFR BLD: 57.9 % (ref 38–73)
NRBC BLD-RTO: 0 /100 WBC
OPIATES UR QL SCN: NEGATIVE
PCP UR QL SCN>25 NG/ML: NEGATIVE
PLATELET # BLD AUTO: 246 K/UL (ref 150–350)
PMV BLD AUTO: 9.1 FL (ref 9.2–12.9)
POTASSIUM SERPL-SCNC: 3.9 MMOL/L (ref 3.5–5.1)
PROT SERPL-MCNC: 7.4 G/DL (ref 6–8.4)
RBC # BLD AUTO: 4.5 M/UL (ref 4–5.4)
SODIUM SERPL-SCNC: 136 MMOL/L (ref 136–145)
TOXICOLOGY INFORMATION: NORMAL
TROPONIN I SERPL DL<=0.01 NG/ML-MCNC: <0.006 NG/ML (ref 0–0.03)
WBC # BLD AUTO: 12.72 K/UL (ref 3.9–12.7)

## 2020-02-11 PROCEDURE — 85025 COMPLETE CBC W/AUTO DIFF WBC: CPT

## 2020-02-11 PROCEDURE — 96376 TX/PRO/DX INJ SAME DRUG ADON: CPT

## 2020-02-11 PROCEDURE — 99284 EMERGENCY DEPT VISIT MOD MDM: CPT | Mod: 25

## 2020-02-11 PROCEDURE — 63600175 PHARM REV CODE 636 W HCPCS: Performed by: EMERGENCY MEDICINE

## 2020-02-11 PROCEDURE — 94640 AIRWAY INHALATION TREATMENT: CPT

## 2020-02-11 PROCEDURE — 25000003 PHARM REV CODE 250: Performed by: EMERGENCY MEDICINE

## 2020-02-11 PROCEDURE — 80307 DRUG TEST PRSMV CHEM ANLYZR: CPT

## 2020-02-11 PROCEDURE — 96374 THER/PROPH/DIAG INJ IV PUSH: CPT

## 2020-02-11 PROCEDURE — 81025 URINE PREGNANCY TEST: CPT | Performed by: NURSE PRACTITIONER

## 2020-02-11 PROCEDURE — 93010 EKG 12-LEAD: ICD-10-PCS | Mod: ,,, | Performed by: INTERNAL MEDICINE

## 2020-02-11 PROCEDURE — 80053 COMPREHEN METABOLIC PANEL: CPT

## 2020-02-11 PROCEDURE — 84484 ASSAY OF TROPONIN QUANT: CPT

## 2020-02-11 PROCEDURE — 25000242 PHARM REV CODE 250 ALT 637 W/ HCPCS: Performed by: EMERGENCY MEDICINE

## 2020-02-11 PROCEDURE — 96375 TX/PRO/DX INJ NEW DRUG ADDON: CPT

## 2020-02-11 PROCEDURE — 93005 ELECTROCARDIOGRAM TRACING: CPT

## 2020-02-11 PROCEDURE — 85379 FIBRIN DEGRADATION QUANT: CPT

## 2020-02-11 PROCEDURE — 93010 ELECTROCARDIOGRAM REPORT: CPT | Mod: ,,, | Performed by: INTERNAL MEDICINE

## 2020-02-11 RX ORDER — METHYLPREDNISOLONE SOD SUCC 125 MG
125 VIAL (EA) INJECTION
Status: COMPLETED | OUTPATIENT
Start: 2020-02-11 | End: 2020-02-11

## 2020-02-11 RX ORDER — KETOROLAC TROMETHAMINE 30 MG/ML
30 INJECTION, SOLUTION INTRAMUSCULAR; INTRAVENOUS
Status: COMPLETED | OUTPATIENT
Start: 2020-02-11 | End: 2020-02-11

## 2020-02-11 RX ORDER — IPRATROPIUM BROMIDE AND ALBUTEROL SULFATE 2.5; .5 MG/3ML; MG/3ML
3 SOLUTION RESPIRATORY (INHALATION)
Status: COMPLETED | OUTPATIENT
Start: 2020-02-11 | End: 2020-02-11

## 2020-02-11 RX ORDER — ONDANSETRON 2 MG/ML
4 INJECTION INTRAMUSCULAR; INTRAVENOUS
Status: COMPLETED | OUTPATIENT
Start: 2020-02-11 | End: 2020-02-11

## 2020-02-11 RX ORDER — ALPRAZOLAM 0.5 MG/1
0.5 TABLET ORAL 3 TIMES DAILY
COMMUNITY

## 2020-02-11 RX ORDER — PROMETHAZINE HYDROCHLORIDE AND CODEINE PHOSPHATE 6.25; 1 MG/5ML; MG/5ML
5 SOLUTION ORAL
Status: COMPLETED | OUTPATIENT
Start: 2020-02-11 | End: 2020-02-11

## 2020-02-11 RX ORDER — FLUOXETINE HYDROCHLORIDE 20 MG/1
20 CAPSULE ORAL DAILY
COMMUNITY

## 2020-02-11 RX ADMIN — ONDANSETRON HYDROCHLORIDE 4 MG: 2 SOLUTION INTRAMUSCULAR; INTRAVENOUS at 11:02

## 2020-02-11 RX ADMIN — KETOROLAC TROMETHAMINE 30 MG: 30 INJECTION, SOLUTION INTRAMUSCULAR at 11:02

## 2020-02-11 RX ADMIN — METHYLPREDNISOLONE SODIUM SUCCINATE 125 MG: 125 INJECTION, POWDER, FOR SOLUTION INTRAMUSCULAR; INTRAVENOUS at 11:02

## 2020-02-11 RX ADMIN — PROMETHAZINE HYDROCHLORIDE AND CODEINE PHOSPHATE 5 ML: 10; 6.25 SOLUTION ORAL at 11:02

## 2020-02-11 RX ADMIN — IPRATROPIUM BROMIDE AND ALBUTEROL SULFATE 3 ML: .5; 3 SOLUTION RESPIRATORY (INHALATION) at 11:02

## 2020-02-12 VITALS
RESPIRATION RATE: 18 BRPM | DIASTOLIC BLOOD PRESSURE: 98 MMHG | HEIGHT: 62 IN | SYSTOLIC BLOOD PRESSURE: 165 MMHG | TEMPERATURE: 98 F | HEART RATE: 109 BPM | WEIGHT: 185 LBS | OXYGEN SATURATION: 98 % | BODY MASS INDEX: 34.04 KG/M2

## 2020-02-12 PROCEDURE — 63600175 PHARM REV CODE 636 W HCPCS: Performed by: EMERGENCY MEDICINE

## 2020-02-12 RX ORDER — PROMETHAZINE HYDROCHLORIDE AND CODEINE PHOSPHATE 6.25; 1 MG/5ML; MG/5ML
5 SOLUTION ORAL EVERY 4 HOURS PRN
Qty: 90 ML | Refills: 0 | Status: SHIPPED | OUTPATIENT
Start: 2020-02-12 | End: 2020-02-22

## 2020-02-12 RX ORDER — ALBUTEROL SULFATE 90 UG/1
2 AEROSOL, METERED RESPIRATORY (INHALATION) EVERY 4 HOURS PRN
Qty: 18 G | Refills: 1 | Status: SHIPPED | OUTPATIENT
Start: 2020-02-12 | End: 2021-02-11

## 2020-02-12 RX ORDER — ALBUTEROL SULFATE 2.5 MG/.5ML
2.5 SOLUTION RESPIRATORY (INHALATION) EVERY 4 HOURS PRN
Qty: 30 EACH | Refills: 2 | Status: SHIPPED | OUTPATIENT
Start: 2020-02-12 | End: 2021-02-11

## 2020-02-12 RX ORDER — HYDROMORPHONE HYDROCHLORIDE 2 MG/ML
1 INJECTION, SOLUTION INTRAMUSCULAR; INTRAVENOUS; SUBCUTANEOUS
Status: COMPLETED | OUTPATIENT
Start: 2020-02-12 | End: 2020-02-12

## 2020-02-12 RX ORDER — ONDANSETRON 2 MG/ML
4 INJECTION INTRAMUSCULAR; INTRAVENOUS
Status: COMPLETED | OUTPATIENT
Start: 2020-02-12 | End: 2020-02-12

## 2020-02-12 RX ADMIN — ONDANSETRON HYDROCHLORIDE 4 MG: 2 SOLUTION INTRAMUSCULAR; INTRAVENOUS at 01:02

## 2020-02-12 RX ADMIN — HYDROMORPHONE HYDROCHLORIDE 1 MG: 2 INJECTION, SOLUTION INTRAMUSCULAR; INTRAVENOUS; SUBCUTANEOUS at 01:02

## 2020-02-12 NOTE — ED PROVIDER NOTES
"dEncounter Date: 2/11/2020       History     Chief Complaint   Patient presents with    Shortness of Breath     For the past week reports worsened SOB & coughing. Reports difficulty breathing when attempting to take a deep breath in. Hypertensive in triage 203/114 & 222/119 accompanied with HA/blurred vision. +n/v that started yesterday morning. Hx of bronchitis    Chest Pain    Hypertension     36 yo female presents via personal transporation (cousin dropped her off) with shortness of breath and chest pain.     Patient reports she developed a URI "when "[employer] won the [facility]" about a month ago (1/13/20). She reports having fevers/chills, a cough, and body aches, which she treated with over the counter medications. However cough became persistent and associated with chest pain.     Patient came to this ED about 2.5 weeks ago (1/25/20). She had a reassuring CXR (no infiltrate) and EKG (sinus tach only). Mary was tx'ed with IV solumedrol 125mg, IV toradol 10mg, IV dilaudid 1mg, IV cetriaxone 1g, and duoneb. She was d/c'ed on Albuterol MDI and Augmentin BID for 10 days.     Patient reports that her symptoms did improve with ED tx, but then recurred about a week ago. Her cough is nonproductive but associated with nausea/vomiting for the last 2 days. Patient reports anterior chest pain which is worse with coughing. She also reports "clammy" skin today and subjective fevers today and yesterday. Patient reports a headache and blurred vision as well.     Patient reports compliance with Lisinopril qAM and Propranolol BID. She notes her BP is typically up when she is in pain.     Patient states she has not smoked tobacco since she became sick.     PMH: HTN, migraines, pancreatitis, UTI, carpal tunnel  Psych: anxiety, depression  PSH: cholecystectomy, R knee surgery, breast reduction        Review of patient's allergies indicates:   Allergen Reactions    Clindamycin Hives and Itching    Compazine [prochlorperazine " "edisylate] Anxiety    Fioricet [butalbital-acetaminophen-caff] Hives and Other (See Comments)     Reaction:  "Really bad yeast infections"    Reglan [metoclopramide] Hives and Rash     Past Medical History:   Diagnosis Date    Anxiety     Bladder infection     Carpal tunnel syndrome     Depression     Hypertension     Migraine headache     Pancreatitis     UTI (lower urinary tract infection)      Past Surgical History:   Procedure Laterality Date    CHOLECYSTECTOMY      KNEE SURGERY   aug 2008    right knee    KNEE SURGERY      REDUCTION MAMMAPLASTY      Breast reduction     Family History   Problem Relation Age of Onset    Hypertension Mother     Hypertension Father     Hypertension Maternal Grandmother     Breast cancer Neg Hx     Colon cancer Neg Hx     Ovarian cancer Neg Hx      Social History     Tobacco Use    Smoking status: Former Smoker     Years: 8.00     Types: Cigarettes    Smokeless tobacco: Never Used    Tobacco comment: has not smoked in 1 month   Substance Use Topics    Alcohol use: No    Drug use: No     Review of Systems   Constitutional: Positive for diaphoresis and fever (subjective).   HENT: Negative for sore throat.    Eyes: Positive for visual disturbance.   Respiratory: Positive for cough and shortness of breath.    Cardiovascular: Positive for chest pain (with coughing).   Gastrointestinal: Positive for nausea and vomiting. Negative for abdominal pain.   Genitourinary: Negative for dysuria.   Musculoskeletal: Negative for neck stiffness.   Skin: Negative for rash.   Neurological: Positive for headaches. Negative for dizziness.       Physical Exam     Initial Vitals [02/11/20 2210]   BP Pulse Resp Temp SpO2   (!) 203/114 (!) 115 20 98.1 °F (36.7 °C) 98 %      MAP       --         Physical Exam    Nursing note and vitals reviewed.  Constitutional: She appears well-developed and well-nourished. She is not diaphoretic.   Awake, alert, obese adult female.   HENT:   Head: " Normocephalic and atraumatic.   Mouth/Throat: Oropharynx is clear and moist.   Eyes: Conjunctivae and EOM are normal. Pupils are equal, round, and reactive to light.   Neck: Normal range of motion. Neck supple.   Cardiovascular: Regular rhythm, normal heart sounds and intact distal pulses.   No murmur heard.  Tachycardic, regular.   Pulmonary/Chest: Breath sounds normal. No respiratory distress. She has no wheezes. She has no rhonchi. She has no rales.   Dry intermittent cough.   Abdominal: Soft. There is no tenderness.   Musculoskeletal: Normal range of motion. She exhibits tenderness (mild diffuse upper back). She exhibits no edema.   Neurological: She is alert and oriented to person, place, and time. She has normal strength.   Moving all extremities.   Skin: Skin is warm and dry. No erythema. No pallor.   Psychiatric: She has a normal mood and affect.         ED Course   Procedures  Labs Reviewed   CBC W/ AUTO DIFFERENTIAL - Abnormal; Notable for the following components:       Result Value    WBC 12.72 (*)     MPV 9.1 (*)     Immature Granulocytes 0.6 (*)     Immature Grans (Abs) 0.08 (*)     All other components within normal limits   COMPREHENSIVE METABOLIC PANEL - Abnormal; Notable for the following components:    CO2 22 (*)     Glucose 246 (*)     All other components within normal limits   TROPONIN I   D DIMER, QUANTITATIVE   DRUG SCREEN PANEL, URINE EMERGENCY   POCT URINE PREGNANCY     EKG Readings: (Independently Interpreted)   21:56: Sinus tach, . Normal axis. No ectopy. No STEMI.      ECG Results          EKG 12-lead (In process)  Result time 02/12/20 06:03:35    In process by Interface, Lab In [facility] (02/12/20 06:03:35)                 Narrative:    Test Reason : R06.02,    Vent. Rate : 102 BPM     Atrial Rate : 102 BPM     P-R Int : 144 ms          QRS Dur : 078 ms      QT Int : 334 ms       P-R-T Axes : 038 067 034 degrees     QTc Int : 435 ms    Sinus tachycardia  Otherwise normal ECG  When  compared with ECG of 25-JAN-2020 11:26,  No significant change was found    Referred By: [email or web address]   SELF           Confirmed By:                   In process by Interface, Lab In [facility] (02/12/20 05:47:42)                 Narrative:    Test Reason : R06.02,    Vent. Rate : 102 BPM     Atrial Rate : 102 BPM     P-R Int : 144 ms          QRS Dur : 078 ms      QT Int : 334 ms       P-R-T Axes : 038 067 034 degrees     QTc Int : 435 ms    Sinus tachycardia  Otherwise normal ECG      Referred By: [email or web address]   SELF           Confirmed By:                             Imaging Results          X-Ray Chest PA And Lateral (Final result)  Result time 02/11/20 22:43:56    Final result by Jayla Samson MD (02/11/20 22:43:56)                 Impression:      No acute cardiopulmonary process identified.      Electronically signed by: Jayla Samson MD  Date:    02/11/2020  Time:    22:43             Narrative:    EXAMINATION:  XR CHEST PA AND LATERAL    CLINICAL HISTORY:  Shortness of breath    TECHNIQUE:  PA and lateral views of the chest were performed.    COMPARISON:  01/25/2020.    FINDINGS:  Cardiac silhouette is normal in size.  Lungs are symmetrically expanded.  No evidence of focal consolidative process, pneumothorax, or significant effusion.  No acute osseous abnormality identified.                              X-Rays:   Independently Interpreted Readings:   Other Readings:  CXR NAD    Medical Decision Making:   History:   Old Medical Records: I decided to obtain old medical records.  Old Records Summarized: records from previous admission(s).  Initial Assessment:   34 yo female with cough, chest pain, subjective fever and diaphoresis, nausea/vomiting, headache, blurry vision. Hypertensive here.  Differential Diagnosis:   Ddx includes influenza, other viral URI with cough, PNA, hypertensive urgency vs emergency, other.  Independently Interpreted Test(s):   I have ordered and independently interpreted X-rays - see  prior notes.  I have ordered and independently interpreted EKG Reading(s) - see prior notes  Clinical Tests:   Lab Tests: Reviewed and Ordered  Radiological Study: Ordered and Reviewed  Medical Tests: Ordered and Reviewed  ED Management:  UPT negative.    EKG no STEMI.     CXR NAD.    Labs: WBC 12.72 (?steroids). CMP reassuring aside from hyperglycemia without anion gap to suggest DKA. Troponin normal; in the setting of hours to days of symptoms, this rules out ACS. Normal ddimer is reassuring for no PE and no dissection. Tox negative.    Patient had IV toradol 30mg, duoneb x 1, phenergan-codeine cough syrup, solumedrol 125mg IV,  zofran 4mg IV. She did feel somewhat better after this treatment, but still reported chest pain. I ordered 1 dose of IV dilaudid 1mg / zofran 4 mg prior to discharge.    I explained reassuring workup to patient.     I suspect patient has had viral rather than bacterial URI since her symptoms started. I have explained that another course of antibiotics is unlikely to be beneficial. As patient has also completed a course of steroids, I doubt that another course of steroids would be helpful, either, especially as the patient's sugar is already elevated, and steroids have other adverse effects as well.     I have rx'ed supportive care to patient including nebulizer, albuterol for nebulizer, phenergan/codeine. I have also refilled albuterol MDI.     D/c'ed to home. BP remains somewhat elevated -- patient cautioned to f/u to PMD. Return precautions discussed.                                  Clinical Impression:       ICD-10-CM ICD-9-CM   1. Viral URI with cough J06.9 465.9    B97.89    2. Shortness of breath R06.02 786.05   3. Chest pain, unspecified type R07.9 786.50   4. Pleuritic chest pain R07.81 786.52                             Blanca Mitchell MD  02/12/20 0619

## 2020-02-22 ENCOUNTER — HOSPITAL ENCOUNTER (EMERGENCY)
Facility: HOSPITAL | Age: 35
Discharge: HOME | End: 2020-02-22
Attending: EMERGENCY MEDICINE
Payer: MEDICAID

## 2020-02-22 VITALS
HEIGHT: 62 IN | OXYGEN SATURATION: 99 % | BODY MASS INDEX: 34.04 KG/M2 | WEIGHT: 185 LBS | SYSTOLIC BLOOD PRESSURE: 170 MMHG | TEMPERATURE: 98 F | DIASTOLIC BLOOD PRESSURE: 89 MMHG | HEART RATE: 96 BPM | RESPIRATION RATE: 15 BRPM

## 2020-02-22 DIAGNOSIS — J40 BRONCHITIS: ICD-10-CM

## 2020-02-22 DIAGNOSIS — R07.9 CHEST PAIN: Primary | ICD-10-CM

## 2020-02-22 LAB
ALBUMIN SERPL BCP-MCNC: 3.8 G/DL (ref 3.5–5.2)
ALP SERPL-CCNC: 62 U/L (ref 55–135)
ALT SERPL W/O P-5'-P-CCNC: 45 U/L (ref 10–44)
ANION GAP SERPL CALC-SCNC: 16 MMOL/L (ref 8–16)
AST SERPL-CCNC: 54 U/L (ref 10–40)
B-HCG UR QL: NEGATIVE
BASOPHILS # BLD AUTO: 0.06 K/UL (ref 0–0.2)
BASOPHILS NFR BLD: 0.4 % (ref 0–1.9)
BILIRUB SERPL-MCNC: 0.3 MG/DL (ref 0.1–1)
BNP SERPL-MCNC: <10 PG/ML (ref 0–99)
BUN SERPL-MCNC: 11 MG/DL (ref 6–20)
CALCIUM SERPL-MCNC: 10.1 MG/DL (ref 8.7–10.5)
CHLORIDE SERPL-SCNC: 102 MMOL/L (ref 95–110)
CO2 SERPL-SCNC: 19 MMOL/L (ref 23–29)
CREAT SERPL-MCNC: 0.8 MG/DL (ref 0.5–1.4)
CTP QC/QA: YES
D DIMER PPP IA.FEU-MCNC: 0.24 MG/L FEU
DIFFERENTIAL METHOD: ABNORMAL
EOSINOPHIL # BLD AUTO: 0.1 K/UL (ref 0–0.5)
EOSINOPHIL NFR BLD: 0.8 % (ref 0–8)
ERYTHROCYTE [DISTWIDTH] IN BLOOD BY AUTOMATED COUNT: 13 % (ref 11.5–14.5)
EST. GFR  (AFRICAN AMERICAN): >60 ML/MIN/1.73 M^2
EST. GFR  (NON AFRICAN AMERICAN): >60 ML/MIN/1.73 M^2
GLUCOSE SERPL-MCNC: 265 MG/DL (ref 70–110)
HCT VFR BLD AUTO: 40.1 % (ref 37–48.5)
HGB BLD-MCNC: 13.6 G/DL (ref 12–16)
IMM GRANULOCYTES # BLD AUTO: 0.18 K/UL (ref 0–0.04)
IMM GRANULOCYTES NFR BLD AUTO: 1.2 % (ref 0–0.5)
LYMPHOCYTES # BLD AUTO: 5.8 K/UL (ref 1–4.8)
LYMPHOCYTES NFR BLD: 38 % (ref 18–48)
MCH RBC QN AUTO: 29.8 PG (ref 27–31)
MCHC RBC AUTO-ENTMCNC: 33.9 G/DL (ref 32–36)
MCV RBC AUTO: 88 FL (ref 82–98)
MONOCYTES # BLD AUTO: 0.8 K/UL (ref 0.3–1)
MONOCYTES NFR BLD: 5.4 % (ref 4–15)
NEUTROPHILS # BLD AUTO: 8.2 K/UL (ref 1.8–7.7)
NEUTROPHILS NFR BLD: 54.2 % (ref 38–73)
NRBC BLD-RTO: 0 /100 WBC
PLATELET # BLD AUTO: 323 K/UL (ref 150–350)
PMV BLD AUTO: 9.3 FL (ref 9.2–12.9)
POTASSIUM SERPL-SCNC: 3.3 MMOL/L (ref 3.5–5.1)
PROT SERPL-MCNC: 7.5 G/DL (ref 6–8.4)
RBC # BLD AUTO: 4.57 M/UL (ref 4–5.4)
SODIUM SERPL-SCNC: 137 MMOL/L (ref 136–145)
TROPONIN I SERPL DL<=0.01 NG/ML-MCNC: <0.006 NG/ML (ref 0–0.03)
WBC # BLD AUTO: 15.17 K/UL (ref 3.9–12.7)

## 2020-02-22 PROCEDURE — 93005 ELECTROCARDIOGRAM TRACING: CPT

## 2020-02-22 PROCEDURE — 83880 ASSAY OF NATRIURETIC PEPTIDE: CPT

## 2020-02-22 PROCEDURE — 85379 FIBRIN DEGRADATION QUANT: CPT

## 2020-02-22 PROCEDURE — 84484 ASSAY OF TROPONIN QUANT: CPT

## 2020-02-22 PROCEDURE — 96375 TX/PRO/DX INJ NEW DRUG ADDON: CPT

## 2020-02-22 PROCEDURE — 96361 HYDRATE IV INFUSION ADD-ON: CPT

## 2020-02-22 PROCEDURE — 93010 EKG 12-LEAD: ICD-10-PCS | Mod: ,,, | Performed by: INTERNAL MEDICINE

## 2020-02-22 PROCEDURE — 93010 ELECTROCARDIOGRAM REPORT: CPT | Mod: ,,, | Performed by: INTERNAL MEDICINE

## 2020-02-22 PROCEDURE — 81025 URINE PREGNANCY TEST: CPT | Performed by: NURSE PRACTITIONER

## 2020-02-22 PROCEDURE — 96374 THER/PROPH/DIAG INJ IV PUSH: CPT

## 2020-02-22 PROCEDURE — 85025 COMPLETE CBC W/AUTO DIFF WBC: CPT

## 2020-02-22 PROCEDURE — 25000003 PHARM REV CODE 250: Performed by: EMERGENCY MEDICINE

## 2020-02-22 PROCEDURE — 80053 COMPREHEN METABOLIC PANEL: CPT

## 2020-02-22 PROCEDURE — 63600175 PHARM REV CODE 636 W HCPCS: Performed by: EMERGENCY MEDICINE

## 2020-02-22 PROCEDURE — 25000003 PHARM REV CODE 250: Performed by: NURSE PRACTITIONER

## 2020-02-22 PROCEDURE — 99285 EMERGENCY DEPT VISIT HI MDM: CPT | Mod: 25

## 2020-02-22 RX ORDER — DEXAMETHASONE SODIUM PHOSPHATE 4 MG/ML
10 INJECTION, SOLUTION INTRA-ARTICULAR; INTRALESIONAL; INTRAMUSCULAR; INTRAVENOUS; SOFT TISSUE
Status: COMPLETED | OUTPATIENT
Start: 2020-02-22 | End: 2020-02-22

## 2020-02-22 RX ORDER — KETOROLAC TROMETHAMINE 30 MG/ML
15 INJECTION, SOLUTION INTRAMUSCULAR; INTRAVENOUS
Status: COMPLETED | OUTPATIENT
Start: 2020-02-22 | End: 2020-02-22

## 2020-02-22 RX ORDER — HYDROCODONE BITARTRATE AND ACETAMINOPHEN 5; 325 MG/1; MG/1
1 TABLET ORAL
Status: COMPLETED | OUTPATIENT
Start: 2020-02-22 | End: 2020-02-22

## 2020-02-22 RX ORDER — ALBUTEROL SULFATE 90 UG/1
1-2 AEROSOL, METERED RESPIRATORY (INHALATION) EVERY 6 HOURS PRN
Qty: 18 G | Refills: 0 | OUTPATIENT
Start: 2020-02-22 | End: 2021-11-29

## 2020-02-22 RX ORDER — MORPHINE SULFATE 10 MG/ML
4 INJECTION INTRAMUSCULAR; INTRAVENOUS; SUBCUTANEOUS
Status: COMPLETED | OUTPATIENT
Start: 2020-02-22 | End: 2020-02-22

## 2020-02-22 RX ORDER — IBUPROFEN 600 MG/1
600 TABLET ORAL EVERY 6 HOURS PRN
Qty: 20 TABLET | Refills: 0 | OUTPATIENT
Start: 2020-02-22 | End: 2022-12-05

## 2020-02-22 RX ORDER — AZITHROMYCIN 250 MG/1
500 TABLET, FILM COATED ORAL DAILY
Qty: 6 TABLET | Refills: 0 | Status: SHIPPED | OUTPATIENT
Start: 2020-02-22 | End: 2021-08-15

## 2020-02-22 RX ORDER — PREDNISONE 50 MG/1
50 TABLET ORAL DAILY
Qty: 4 TABLET | Refills: 0 | Status: SHIPPED | OUTPATIENT
Start: 2020-02-22 | End: 2020-02-26

## 2020-02-22 RX ORDER — ONDANSETRON 2 MG/ML
4 INJECTION INTRAMUSCULAR; INTRAVENOUS
Status: COMPLETED | OUTPATIENT
Start: 2020-02-22 | End: 2020-02-22

## 2020-02-22 RX ORDER — LABETALOL HYDROCHLORIDE 5 MG/ML
10 INJECTION, SOLUTION INTRAVENOUS
Status: COMPLETED | OUTPATIENT
Start: 2020-02-22 | End: 2020-02-22

## 2020-02-22 RX ORDER — ASPIRIN 325 MG
325 TABLET ORAL
Status: COMPLETED | OUTPATIENT
Start: 2020-02-22 | End: 2020-02-22

## 2020-02-22 RX ADMIN — SODIUM CHLORIDE 1000 ML: 0.9 INJECTION, SOLUTION INTRAVENOUS at 03:02

## 2020-02-22 RX ADMIN — ASPIRIN 325 MG ORAL TABLET 325 MG: 325 PILL ORAL at 01:02

## 2020-02-22 RX ADMIN — LABETALOL HYDROCHLORIDE 10 MG: 5 INJECTION, SOLUTION INTRAVENOUS at 05:02

## 2020-02-22 RX ADMIN — HYDROCODONE BITARTRATE AND ACETAMINOPHEN 1 TABLET: 5; 325 TABLET ORAL at 07:02

## 2020-02-22 RX ADMIN — ONDANSETRON HYDROCHLORIDE 4 MG: 2 SOLUTION INTRAMUSCULAR; INTRAVENOUS at 03:02

## 2020-02-22 RX ADMIN — DEXAMETHASONE SODIUM PHOSPHATE 10 MG: 4 INJECTION, SOLUTION INTRA-ARTICULAR; INTRALESIONAL; INTRAMUSCULAR; INTRAVENOUS; SOFT TISSUE at 05:02

## 2020-02-22 RX ADMIN — KETOROLAC TROMETHAMINE 15 MG: 30 INJECTION, SOLUTION INTRAMUSCULAR at 07:02

## 2020-02-22 RX ADMIN — MORPHINE SULFATE 4 MG: 10 INJECTION INTRAVENOUS at 03:02

## 2020-02-22 NOTE — ED PROVIDER NOTES
"Encounter Date: 2/22/2020       History     Chief Complaint   Patient presents with    Chest Pain     Per EMS pt had a  upon arrival, had pt perform vagal maneuver HR decresed to 134.      HPI     35 y.o.female with PMHx HTN, tachycardia, Depression, anemia, tobacco abuse  presents with tachycardia, chest pain x 1 day. Pt reports she has been having a worsening cough and SOB over the last week, with what she thought was bronchitis that has made her feel worse today, and reports calling EMS when she started to have worsening chest pain, especially with cough.  She reprots some clear sputum production and states it has been a non-productive cough normally.  She was reportedly was extremely tachycardic which resolved after vagal maneuvers, and patient reports getting a breathing treatment as well pta which has helped some too.  She reports having normally a fast heart rate and states she has not taken her nighttime medication. She denies any n/v/d/c, abdominal pain, lightheadedness/dizziness, headache, rashes, or any further complaints.    Review of patient's allergies indicates:   Allergen Reactions    Clindamycin Hives and Itching    Compazine [prochlorperazine edisylate] Anxiety    Fioricet [butalbital-acetaminophen-caff] Hives and Other (See Comments)     Reaction:  "Really bad yeast infections"    Reglan [metoclopramide] Hives and Rash     Past Medical History:   Diagnosis Date    Anxiety     Bladder infection     Carpal tunnel syndrome     Depression     Hypertension     Migraine headache     Pancreatitis     UTI (lower urinary tract infection)      Past Surgical History:   Procedure Laterality Date    CHOLECYSTECTOMY      KNEE SURGERY   aug 2008    right knee    KNEE SURGERY      REDUCTION MAMMAPLASTY      Breast reduction     Family History   Problem Relation Age of Onset    Hypertension Mother     Hypertension Father     Hypertension Maternal Grandmother     Breast cancer Neg Hx     " Colon cancer Neg Hx     Ovarian cancer Neg Hx      Social History     Tobacco Use    Smoking status: Former Smoker     Years: 8.00     Types: Cigarettes    Smokeless tobacco: Never Used    Tobacco comment: has not smoked in 1 month   Substance Use Topics    Alcohol use: No    Drug use: No     Review of Systems   Constitutional: Negative.    HENT: Negative.    Eyes: Negative.    Respiratory: Positive for cough.    Cardiovascular: Positive for chest pain.   Gastrointestinal: Negative.    Genitourinary: Negative.    Musculoskeletal: Negative.    Skin: Negative.    Neurological: Negative.        Physical Exam     Initial Vitals [02/22/20 0102]   BP Pulse Resp Temp SpO2   (!) 126/100 (!) 162 20 97.8 °F (36.6 °C) 97 %      MAP       --         Physical Exam    Nursing note and vitals reviewed.  Constitutional: She appears well-developed and well-nourished. She is not diaphoretic. She appears distressed (mildly).   HENT:   Head: Normocephalic and atraumatic.   Nose: Nose normal.   Eyes: EOM are normal. Pupils are equal, round, and reactive to light.   Neck: Normal range of motion. Neck supple. No JVD present.   Cardiovascular: Regular rhythm and normal heart sounds.   No murmur heard.  Pulmonary/Chest: Breath sounds normal. No stridor. No respiratory distress. She has no wheezes. She has no rales.   Abdominal: Soft. Bowel sounds are normal. She exhibits no distension. There is no tenderness.   Musculoskeletal: Normal range of motion. She exhibits no edema or tenderness.   Neurological: She is alert and oriented to person, place, and time. No cranial nerve deficit.   Skin: Skin is warm and dry. Capillary refill takes less than 2 seconds. No rash noted. No erythema.         ED Course   Procedures  Labs Reviewed   CBC W/ AUTO DIFFERENTIAL - Abnormal; Notable for the following components:       Result Value    WBC 15.17 (*)     Immature Granulocytes 1.2 (*)     Gran # (ANC) 8.2 (*)     Immature Grans (Abs) 0.18 (*)      Lymph # 5.8 (*)     All other components within normal limits   COMPREHENSIVE METABOLIC PANEL - Abnormal; Notable for the following components:    Potassium 3.3 (*)     CO2 19 (*)     Glucose 265 (*)     AST 54 (*)     ALT 45 (*)     All other components within normal limits   TROPONIN I   B-TYPE NATRIURETIC PEPTIDE   D DIMER, QUANTITATIVE   POCT URINE PREGNANCY        ECG Results          EKG 12-lead (Final result)  Result time 02/23/20 18:58:30    Final result by Interface, Lab In [facility] (02/23/20 18:58:30)                 Narrative:    Test Reason : R07.9,    Vent. Rate : 129 BPM     Atrial Rate : 129 BPM     P-R Int : 142 ms          QRS Dur : 076 ms      QT Int : 300 ms       P-R-T Axes : 046 077 029 degrees     QTc Int : 439 ms    Sinus tachycardia  Possible Left atrial enlargement  Nonspecific ST abnormality  Abnormal ECG  When compared with ECG of 11-FEB-2020 21:56,  No significant change was found  Confirmed by Umang Meier MD ([id number]) on 2/23/2020 6:58:23 PM    Referred By: [email or web address]   SELF           Confirmed By:Umang Meier MD                            Imaging Results          X-Ray Chest PA And Lateral (Final result)  Result time 02/22/20 02:04:59    Final result by Deisi Mckeon MD (02/22/20 02:04:59)                 Impression:      No acute intrathoracic abnormality.      Electronically signed by: Deisi Mckeon  Date:    02/22/2020  Time:    02:04             Narrative:    EXAMINATION:  CHEST PA AND LATERAL    CLINICAL HISTORY:  Chest pain, unspecified    TECHNIQUE:  PA and lateral chest radiograph    COMPARISON:  02/11/2020    FINDINGS:  The cardiac silhouette is within normal limits.   There is no focal consolidation, pneumothorax, or pleural effusion.                                                MDM:    35 y.o.female with PMHx as noted above, presents with chest pain, cough x 1 week. Physical exam remarkable for mildly distressed appearing female, conversing with ease,a bdomen soft,  nt/nd, tachyardic, no peripheral edema noted.  ED workup remarkable for EKG - sinus tachycardia, rate 129 bpm, no ischemic changes compared to prior, no STEMI, trop - wnl, BNP - wnl, CBC/CMP - mild leukocytosis, CMP unremarkable, DDimer wnl, CXR - unremarkable. Pt presentation consistent with bronchitis, with tachycardia reported at baseline and likely exacerbated by decreased PO intake and albuterol use.  No further recurrence of SVT, and tachycardia nearing baseline after 2 additional L of Nacl given.  Pt given labetalol x 1 for HTN, as she had not taken her home medications.  Will cover with azithromycin and start on prednisone burst, and advised to f/u with PCP in the next few days.  At this time given patient's history, physical exam, and ED workup do not suspect MI/ACS, PE, PTX, aortic dissection, pericarditis, PNA, shingles, or any further malignant cause.  Discussed diagnosis and further treatment with patient, return precautions given and all questions answered.  Patient in understanding of plan.  Pt discharged to home improved and stable.                        Clinical Impression:       ICD-10-CM ICD-9-CM   1. Chest pain R07.9 786.50   2. Bronchitis J40 490                             Valdo Flood MD  02/26/20 1862

## 2020-03-06 ENCOUNTER — HOSPITAL ENCOUNTER (EMERGENCY)
Facility: HOSPITAL | Age: 35
Discharge: HOME | End: 2020-03-06
Attending: EMERGENCY MEDICINE
Payer: MEDICAID

## 2020-03-06 VITALS
SYSTOLIC BLOOD PRESSURE: 140 MMHG | TEMPERATURE: 98 F | BODY MASS INDEX: 34.04 KG/M2 | WEIGHT: 185 LBS | OXYGEN SATURATION: 99 % | DIASTOLIC BLOOD PRESSURE: 95 MMHG | RESPIRATION RATE: 18 BRPM | HEART RATE: 95 BPM | HEIGHT: 62 IN
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
## 2020-03-06 DIAGNOSIS — H10.32 ACUTE CONJUNCTIVITIS OF LEFT EYE, UNSPECIFIED ACUTE CONJUNCTIVITIS TYPE: ICD-10-CM

## 2020-03-06 DIAGNOSIS — S00.81XA CAT SCRATCH OF FACE, INITIAL ENCOUNTER: Primary | ICD-10-CM

## 2020-03-06 DIAGNOSIS — R07.9 CHEST PAIN: ICD-10-CM

## 2020-03-06 LAB
ANION GAP SERPL CALC-SCNC: 11 MMOL/L (ref 8–16)
BASOPHILS # BLD AUTO: 0.03 K/UL (ref 0–0.2)
BASOPHILS NFR BLD: 0.2 % (ref 0–1.9)
BUN SERPL-MCNC: 10 MG/DL (ref 6–20)
CALCIUM SERPL-MCNC: 9.8 MG/DL (ref 8.7–10.5)
CHLORIDE SERPL-SCNC: 103 MMOL/L (ref 95–110)
CO2 SERPL-SCNC: 20 MMOL/L (ref 23–29)
CREAT SERPL-MCNC: 0.7 MG/DL (ref 0.5–1.4)
DIFFERENTIAL METHOD: ABNORMAL
EOSINOPHIL # BLD AUTO: 0.2 K/UL (ref 0–0.5)
EOSINOPHIL NFR BLD: 1.2 % (ref 0–8)
ERYTHROCYTE [DISTWIDTH] IN BLOOD BY AUTOMATED COUNT: 13 % (ref 11.5–14.5)
EST. GFR  (AFRICAN AMERICAN): >60 ML/MIN/1.73 M^2
EST. GFR  (NON AFRICAN AMERICAN): >60 ML/MIN/1.73 M^2
GLUCOSE SERPL-MCNC: 170 MG/DL (ref 70–110)
HCT VFR BLD AUTO: 40 % (ref 37–48.5)
HGB BLD-MCNC: 13.1 G/DL (ref 12–16)
IMM GRANULOCYTES # BLD AUTO: 0.09 K/UL (ref 0–0.04)
IMM GRANULOCYTES NFR BLD AUTO: 0.7 % (ref 0–0.5)
LYMPHOCYTES # BLD AUTO: 4.4 K/UL (ref 1–4.8)
LYMPHOCYTES NFR BLD: 33.9 % (ref 18–48)
MCH RBC QN AUTO: 29.6 PG (ref 27–31)
MCHC RBC AUTO-ENTMCNC: 32.8 G/DL (ref 32–36)
MCV RBC AUTO: 91 FL (ref 82–98)
MONOCYTES # BLD AUTO: 0.8 K/UL (ref 0.3–1)
MONOCYTES NFR BLD: 6.3 % (ref 4–15)
NEUTROPHILS # BLD AUTO: 7.5 K/UL (ref 1.8–7.7)
NEUTROPHILS NFR BLD: 57.7 % (ref 38–73)
NRBC BLD-RTO: 0 /100 WBC
PLATELET # BLD AUTO: 262 K/UL (ref 150–350)
PMV BLD AUTO: 9.1 FL (ref 9.2–12.9)
POTASSIUM SERPL-SCNC: 3.8 MMOL/L (ref 3.5–5.1)
RBC # BLD AUTO: 4.42 M/UL (ref 4–5.4)
SODIUM SERPL-SCNC: 134 MMOL/L (ref 136–145)
WBC # BLD AUTO: 12.95 K/UL (ref 3.9–12.7)

## 2020-03-06 PROCEDURE — 96375 TX/PRO/DX INJ NEW DRUG ADDON: CPT

## 2020-03-06 PROCEDURE — 93005 ELECTROCARDIOGRAM TRACING: CPT

## 2020-03-06 PROCEDURE — 96361 HYDRATE IV INFUSION ADD-ON: CPT

## 2020-03-06 PROCEDURE — 93005 ELECTROCARDIOGRAM TRACING: CPT | Performed by: INTERNAL MEDICINE

## 2020-03-06 PROCEDURE — 99285 EMERGENCY DEPT VISIT HI MDM: CPT | Mod: 25

## 2020-03-06 PROCEDURE — 96374 THER/PROPH/DIAG INJ IV PUSH: CPT | Mod: 59

## 2020-03-06 PROCEDURE — 80048 BASIC METABOLIC PNL TOTAL CA: CPT

## 2020-03-06 PROCEDURE — 93010 EKG 12-LEAD: ICD-10-PCS | Mod: ,,, | Performed by: INTERNAL MEDICINE

## 2020-03-06 PROCEDURE — 93010 ELECTROCARDIOGRAM REPORT: CPT | Mod: ,,, | Performed by: INTERNAL MEDICINE

## 2020-03-06 PROCEDURE — 25000003 PHARM REV CODE 250: Performed by: EMERGENCY MEDICINE

## 2020-03-06 PROCEDURE — 25500020 PHARM REV CODE 255: Performed by: EMERGENCY MEDICINE

## 2020-03-06 PROCEDURE — 85025 COMPLETE CBC W/AUTO DIFF WBC: CPT

## 2020-03-06 PROCEDURE — 63600175 PHARM REV CODE 636 W HCPCS: Performed by: EMERGENCY MEDICINE

## 2020-03-06 RX ORDER — ERYTHROMYCIN 5 MG/G
OINTMENT OPHTHALMIC
Qty: 1 TUBE | Refills: 0 | Status: SHIPPED | OUTPATIENT
Start: 2020-03-06 | End: 2020-03-06 | Stop reason: SDUPTHER

## 2020-03-06 RX ORDER — KETOROLAC TROMETHAMINE 30 MG/ML
15 INJECTION, SOLUTION INTRAMUSCULAR; INTRAVENOUS
Status: COMPLETED | OUTPATIENT
Start: 2020-03-06 | End: 2020-03-06

## 2020-03-06 RX ORDER — TETRACAINE HYDROCHLORIDE 5 MG/ML
2 SOLUTION OPHTHALMIC
Status: COMPLETED | OUTPATIENT
Start: 2020-03-06 | End: 2020-03-06

## 2020-03-06 RX ORDER — ERYTHROMYCIN 5 MG/G
OINTMENT OPHTHALMIC
Qty: 1 TUBE | Refills: 0 | Status: SHIPPED | OUTPATIENT
Start: 2020-03-06

## 2020-03-06 RX ORDER — LABETALOL HYDROCHLORIDE 5 MG/ML
10 INJECTION, SOLUTION INTRAVENOUS
Status: COMPLETED | OUTPATIENT
Start: 2020-03-06 | End: 2020-03-06

## 2020-03-06 RX ORDER — ONDANSETRON 2 MG/ML
4 INJECTION INTRAMUSCULAR; INTRAVENOUS
Status: COMPLETED | OUTPATIENT
Start: 2020-03-06 | End: 2020-03-06

## 2020-03-06 RX ORDER — DOXYCYCLINE 100 MG/1
100 CAPSULE ORAL 2 TIMES DAILY
Qty: 28 CAPSULE | Refills: 0 | Status: SHIPPED | OUTPATIENT
Start: 2020-03-06 | End: 2020-03-20

## 2020-03-06 RX ORDER — MORPHINE SULFATE 10 MG/ML
4 INJECTION INTRAMUSCULAR; INTRAVENOUS; SUBCUTANEOUS
Status: COMPLETED | OUTPATIENT
Start: 2020-03-06 | End: 2020-03-06

## 2020-03-06 RX ADMIN — TETRACAINE HYDROCHLORIDE 2 DROP: 5 SOLUTION OPHTHALMIC at 06:03

## 2020-03-06 RX ADMIN — LABETALOL HYDROCHLORIDE 10 MG: 5 INJECTION INTRAVENOUS at 06:03

## 2020-03-06 RX ADMIN — IOHEXOL 75 ML: 350 INJECTION, SOLUTION INTRAVENOUS at 07:03

## 2020-03-06 RX ADMIN — MORPHINE SULFATE 4 MG: 10 INJECTION INTRAVENOUS at 09:03

## 2020-03-06 RX ADMIN — ONDANSETRON 4 MG: 2 INJECTION INTRAMUSCULAR; INTRAVENOUS at 08:03

## 2020-03-06 RX ADMIN — FLUORESCEIN SODIUM 1 EACH: 1 STRIP OPHTHALMIC at 06:03

## 2020-03-06 RX ADMIN — SODIUM CHLORIDE 1000 ML: 0.9 INJECTION, SOLUTION INTRAVENOUS at 06:03

## 2020-03-06 RX ADMIN — KETOROLAC TROMETHAMINE 15 MG: 30 INJECTION, SOLUTION INTRAMUSCULAR at 06:03

## 2020-03-06 NOTE — ED TRIAGE NOTES
Pt presents to the ED via personal transportation reporting that last Friday her cat scratched her left eye and pt has been noticing increase in pain, swelling, redness, as well as pus to the left eye. Pt also reports she is starting to notice her right eye is getting pink as well. Pt also currently reporting generalized headache with nausea as well as photophobia. Pt also reporting intermittent SOB as well. Pt AAOx4.

## 2020-03-07 NOTE — DISCHARGE INSTRUCTIONS
CT scan of the orbits is within normal ranges.  You have a mild elevation in your white blood cell count.  You should complete the entire course of doxycycline in prescribed.  Be sure to take food with doxycycline to prevent upset stomach.  Be sure to drink a full glass of water with doxycycline to prevent esophageal irritation.  It is very important that you follow up closely with your primary physician as well as ophthalmologist to monitor and further evaluate your symptoms.  Return to the emergency department for fever, changes in your vision or any new, worsening or concerning symptoms.    Thank you for coming to our Emergency Department today. It is important to remember that some problems are difficult to diagnose and may not be found during your first visit. Be sure to follow up with your primary care doctor and review any labs/imaging that was performed with them. If you do not have a primary care doctor, you may contact the one listed on your discharge paperwork or you may also call the [facility] Appointment Desk at [phone] to schedule an appointment with one.     All medications may potentially have side effects and it is impossible to predict which medications may give you side effects. If you feel that you are having a negative effect of any medication you should immediately stop taking them and seek medical attention.    Return to the ER with any questions/concerns, new/concerning symptoms, worsening or failure to improve. Do not drive or make any important decisions for 24 hours if you have received any pain medications, sedatives or mood altering drugs during your ER visit.

## 2020-03-07 NOTE — ED PROVIDER NOTES
"Encounter Date: 3/6/2020    SCRIBE #1 NOTE: I, Kevin Ayers, am scribing for, and in the presence of,  Chinedu Levine MD. I have scribed the following portions of the note - Other sections scribed: HPI, ROS, PE.       History     Chief Complaint   Patient presents with    Eye Pain     Pt presenting with report that her cat scratched he in the corner of her left eye last Friday and she now has pain redness  swelling and drainage with blurry vision. Pt reports usintg otc Visine. Pt reports at latter part of assessment that she has been having chest pain on and off since last night ekg being done     CC: Eye Pain    HPI: This is a 35 y.o. female with PMHx HTN, anxiety, migraine HA presenting to the ED complaining of constant left eye pain with associated discharge, redness, blurry vision, photophobia since it was scratched by a cat 1 week ago.  Pt also c/o generalized HA with associated nausea, lightheadedness lately.  States she has some chest tightness upon history taking which is usual for her when she feels anxious.  She denies any numbness, weakness.  No CP, SOB.  No recent travels.  No h/o thrombosis.  She reports compliance with daily meds and last took them this morning.  She is on birth control.        The history is provided by the patient and medical records. No  was used.     Review of patient's allergies indicates:   Allergen Reactions    Clindamycin Hives and Itching    Compazine [prochlorperazine edisylate] Anxiety    Fioricet [butalbital-acetaminophen-caff] Hives and Other (See Comments)     Reaction:  "Really bad yeast infections"    Reglan [metoclopramide] Hives and Rash     Past Medical History:   Diagnosis Date    Anxiety     Bladder infection     Carpal tunnel syndrome     Depression     Hypertension     Migraine headache     Pancreatitis     UTI (lower urinary tract infection)      Past Surgical History:   Procedure Laterality Date    CHOLECYSTECTOMY      KNEE " SURGERY   aug 2008    right knee    KNEE SURGERY      REDUCTION MAMMAPLASTY      Breast reduction     Family History   Problem Relation Age of Onset    Hypertension Mother     Hypertension Father     Hypertension Maternal Grandmother     Breast cancer Neg Hx     Colon cancer Neg Hx     Ovarian cancer Neg Hx      Social History     Tobacco Use    Smoking status: Former Smoker     Years: 8.00     Types: Cigarettes    Smokeless tobacco: Never Used    Tobacco comment: has not smoked in 1 month   Substance Use Topics    Alcohol use: No    Drug use: No     Review of Systems   Constitutional: Negative for fever.   HENT: Negative for sore throat.    Eyes: Positive for photophobia, pain, discharge, redness and visual disturbance.   Respiratory: Positive for chest tightness. Negative for shortness of breath.    Cardiovascular: Negative for chest pain.   Gastrointestinal: Positive for nausea. Negative for abdominal pain.   Genitourinary: Negative for dysuria.   Musculoskeletal: Negative for back pain.   Skin: Negative for rash.   Neurological: Positive for light-headedness and headaches. Negative for weakness and numbness.   Psychiatric/Behavioral: The patient is nervous/anxious.        Physical Exam     Initial Vitals [03/06/20 1650]   BP Pulse Resp Temp SpO2   (!) 176/123 (!) 125 19 98.4 °F (36.9 °C) 96 %      MAP       --         Physical Exam    Nursing note and vitals reviewed.  Constitutional: She is not diaphoretic. No distress.   HENT:   Head: Normocephalic.   Mouth/Throat: Oropharynx is clear and moist.   Eyes: EOM are normal. Pupils are equal, round, and reactive to light. Left conjunctiva is injected. No scleral icterus.   Small abrasion to the nasal aspect of left eye.  Induration around the left eye.   Neck: Normal range of motion. Neck supple. No JVD present.   Cardiovascular: Regular rhythm and intact distal pulses. Tachycardia present.    Pulmonary/Chest: Breath sounds normal. No stridor. No  respiratory distress.   Abdominal: Soft. Bowel sounds are normal. She exhibits no distension. There is no tenderness.   Musculoskeletal: Normal range of motion. She exhibits no edema or tenderness.   No lower extremity edema.  No calf tenderness.   Neurological: She is alert. She has normal strength. No cranial nerve deficit or sensory deficit. GCS score is 15. GCS eye subscore is 4. GCS verbal subscore is 5. GCS motor subscore is 6.   Skin: Skin is warm and dry.   Psychiatric: She has a normal mood and affect.         ED Course   Procedures  Labs Reviewed   CBC W/ AUTO DIFFERENTIAL - Abnormal; Notable for the following components:       Result Value    WBC 12.95 (*)     MPV 9.1 (*)     Immature Granulocytes 0.7 (*)     Immature Grans (Abs) 0.09 (*)     All other components within normal limits   BASIC METABOLIC PANEL - Abnormal; Notable for the following components:    Sodium 134 (*)     CO2 20 (*)     Glucose 170 (*)     All other components within normal limits     EKG Readings: (Independently Interpreted)   Initial Reading: No STEMI. Rhythm: Sinus Tachycardia. Heart Rate: 124 bpm. Ectopy: No Ectopy. Conduction: Normal. ST Segments: Normal ST Segments. T Waves Flipped: III. Axis: Normal.       Imaging Results          CT Orbits Sella Post Fossa With Contrast (Final result)  Result time 03/06/20 19:49:22    Final result by Khanh Dolan MD (03/06/20 19:49:22)                 Impression:      1. No findings to suggest orbital cellulitis as clinically questioned.  2. Sinus disease and additional findings as described above.      Electronically signed by: Khanh Dolan MD  Date:    03/06/2020  Time:    19:49             Narrative:    EXAMINATION:  CT ORBITS SELLA POST FOSSA WITH CONTRAST    CLINICAL HISTORY:  Headache, basilar or orbital;eval for orbital cellulitis;    TECHNIQUE:  Axial images of the orbital region were obtained at 0.625 mm intervals following administration of 75 cc omni 350 IV contrast.   Coronal and sagittal reformatted images were reviewed.    COMPARISON:  CT head 04/08/2019    FINDINGS:  No acute displaced fracture or dislocation of the visualized maxillofacial region.  The bilateral mandibular condyles are in appropriate location.  Evaluation of the oral cavity is limited by dental metal.  There is a mucous retention cyst or polyp within the right maxillary sinus.    Limited evaluation of the intracranial content is grossly unremarkable.  The major arterial vasculature of the neck is patent as is the visualized intracranial circulation.  The visualized portions of the parotid glands and remaining salivary glands are unremarkable without intraglandular ductal dilation or inflammation.  No visualized significant tonsillar enlargement.  The nasal soft tissues are unremarkable.  The bilateral globes and orbital content is unremarkable.  No postseptal edema or intraconal inflammation.  No focal organized subcutaneous fluid collection.                                 Medical Decision Making:   History:   Old Medical Records: I decided to obtain old medical records.  Old Records Summarized: other records.       <> Summary of Records: Recent ED visit for chest pain with negative troponin, negative D-dimer.  Differential Diagnosis:   Includes but not limited to:   Conjunctivitis, periorbital cellulitis, orbital cellulitis, migraine, symptomatic hypertension, anxiety.  Independently Interpreted Test(s):   I have ordered and independently interpreted EKG Reading(s) - see prior notes  Clinical Tests:   Lab Tests: Ordered and Reviewed  Radiological Study: Ordered and Reviewed  Medical Tests: Ordered and Reviewed            Scribe Attestation:   Scribe #1: I performed the above scribed service and the documentation accurately describes the services I performed. I attest to the accuracy of the note.            ED Course as of Mar 07 2212   Fri Mar 06, 2020   1843 Patient is afebrile and in no acute distress at  time history and physical.  She is hypertensive and tachycardic has no obvious focal neurological deficits.  She has left conjunctival injection and a an abrasion and complains visual disturbance.  She reports elevated blood pressure and tachycardia associated with her anxiety and reports chest discomfort comparable to past episodes of anxiety. Recent records reviewed and demonstrate recent cardiac workup with negative troponin and negative D-dimer.  I have low clinical suspicion of ACS or PE as part of patient's symptoms. She presents to the ER due to concern that she is having eye infection after being scratched near her eye and reports blood and pus coming.  Will obtain lab testing and imaging to evaluate orbital cellulitis as well as perform optical exam.  Will give analgesia and antihypertensive and reassess    [SG]   9248 CT of the orbits is not demonstrated periorbital or orbital cellulitis.  Labs were significant for leukocytosis.  Patient has improvement in blood pressure and heart rate with treatments provided.  She has been informed the importance of following up with her primary physician for monitoring of her blood pressure adjustment of her blood pressure medication regiment as needed.  Patient's visual acuity is 20/20 bilaterally.  She does have conjunctival injection but she does not have any regional lymphadenopathy.  She has no nuchal rigidity or focal neurological deficits.  Patient clinically does not appear to have cat scratch disease however given her history and unilateral conjunctivitis she has been prescribed a course of doxycycline as well as erythromycin ointment.  She has been referred to Ophthalmology as well as her primary physician. counseled on supportive care, appropriate medication usage, concerning symptoms for which to return to ER and the importance of follow up. Understanding and agreement with treatment plan was expressed.      [SG]      ED Course User Index  [SG] Chinedu CASTAÑEDA  MD Abner          This chart was completed using dictation software, as a result there may be some transcription errors.       Clinical Impression:       ICD-10-CM ICD-9-CM   1. Cat scratch of face, initial encounter S00.81XA 910.0    W55.03XA E906.8   2. Chest pain R07.9 786.50   3. Acute conjunctivitis of left eye, unspecified acute conjunctivitis type H10.32 372.00         I, Chinedu Levine , personally performed the services described in this documentation. All medical record entries made by the scribe were at my direction and in my presence. I have reviewed the chart and agree that the record reflects my personal performance and is accurate and complete.   Disposition:   Disposition: Discharged  Condition: Stable     ED Disposition Condition    Discharge Stable        ED Prescriptions     Medication Sig Dispense Start Date End Date Auth. Provider    doxycycline (VIBRAMYCIN) 100 MG Cap Take 1 capsule (100 mg total) by mouth 2 (two) times daily. for 14 days 28 capsule 3/6/2020 3/20/2020 Chinedu Levine MD    erythromycin (ROMYCIN) ophthalmic ointment  (Status: Discontinued) Place a 1/2 inch ribbon of ointment into the lower eyelid.  Twice daily for 7 days 1 Tube 3/6/2020 3/6/2020 Chinedu Levine MD    erythromycin (ROMYCIN) ophthalmic ointment Place a 1/2 inch ribbon of ointment into the lower eyelid.  Twice daily for 7 days 1 Tube 3/6/2020  Chinedu Levine MD        Follow-up Information     Follow up With Specialties Details Why Contact Info    Kyle Rivera Jr., MD Family Medicine Schedule an appointment as soon as possible for a visit   [address] [place] [address]  [place] [address]  [phone]                                       Chinedu Levine MD  03/07/20 [id number]

## 2020-06-05 ENCOUNTER — HOSPITAL ENCOUNTER (EMERGENCY)
Facility: HOSPITAL | Age: 35
Discharge: HOME | End: 2020-06-05
Attending: EMERGENCY MEDICINE
Payer: MEDICAID

## 2020-06-05 VITALS
WEIGHT: 185 LBS | SYSTOLIC BLOOD PRESSURE: 185 MMHG | DIASTOLIC BLOOD PRESSURE: 99 MMHG | RESPIRATION RATE: 18 BRPM | BODY MASS INDEX: 34.04 KG/M2 | HEART RATE: 111 BPM | HEIGHT: 62 IN | TEMPERATURE: 98 F | OXYGEN SATURATION: 96 %

## 2020-06-05 DIAGNOSIS — J40 BRONCHITIS: Primary | ICD-10-CM

## 2020-06-05 DIAGNOSIS — I10 HYPERTENSION, UNSPECIFIED TYPE: ICD-10-CM

## 2020-06-05 DIAGNOSIS — R06.02 SOB (SHORTNESS OF BREATH): ICD-10-CM

## 2020-06-05 LAB
B-HCG UR QL: NEGATIVE
CTP QC/QA: YES
SARS-COV-2 RDRP RESP QL NAA+PROBE: NEGATIVE

## 2020-06-05 PROCEDURE — 25000003 PHARM REV CODE 250: Performed by: NURSE PRACTITIONER

## 2020-06-05 PROCEDURE — 96372 THER/PROPH/DIAG INJ SC/IM: CPT

## 2020-06-05 PROCEDURE — 63600175 PHARM REV CODE 636 W HCPCS: Performed by: NURSE PRACTITIONER

## 2020-06-05 PROCEDURE — 81025 URINE PREGNANCY TEST: CPT | Performed by: EMERGENCY MEDICINE

## 2020-06-05 PROCEDURE — U0002 COVID-19 LAB TEST NON-CDC: HCPCS

## 2020-06-05 PROCEDURE — 99284 EMERGENCY DEPT VISIT MOD MDM: CPT | Mod: 25

## 2020-06-05 RX ORDER — BETAMETHASONE SODIUM PHOSPHATE AND BETAMETHASONE ACETATE 3; 3 MG/ML; MG/ML
12 INJECTION, SUSPENSION INTRA-ARTICULAR; INTRALESIONAL; INTRAMUSCULAR; SOFT TISSUE
Status: COMPLETED | OUTPATIENT
Start: 2020-06-05 | End: 2020-06-05

## 2020-06-05 RX ORDER — PROMETHAZINE HYDROCHLORIDE AND DEXTROMETHORPHAN HYDROBROMIDE 6.25; 15 MG/5ML; MG/5ML
5 SYRUP ORAL NIGHTLY
Qty: 180 ML | Refills: 0 | OUTPATIENT
Start: 2020-06-05 | End: 2021-11-29

## 2020-06-05 RX ORDER — CLONIDINE HYDROCHLORIDE 0.1 MG/1
0.2 TABLET ORAL
Status: COMPLETED | OUTPATIENT
Start: 2020-06-05 | End: 2020-06-05

## 2020-06-05 RX ORDER — MORPHINE SULFATE 10 MG/ML
4 INJECTION INTRAMUSCULAR; INTRAVENOUS; SUBCUTANEOUS
Status: COMPLETED | OUTPATIENT
Start: 2020-06-05 | End: 2020-06-05

## 2020-06-05 RX ADMIN — BETAMETHASONE SODIUM PHOSPHATE AND BETAMETHASONE ACETATE 12 MG: 3; 3 INJECTION, SUSPENSION INTRA-ARTICULAR; INTRALESIONAL; INTRAMUSCULAR at 06:06

## 2020-06-05 RX ADMIN — CLONIDINE HYDROCHLORIDE 0.2 MG: 0.1 TABLET ORAL at 06:06

## 2020-06-05 RX ADMIN — MORPHINE SULFATE 4 MG: 10 INJECTION INTRAVENOUS at 06:06

## 2020-06-05 NOTE — ED PROVIDER NOTES
"Encounter Date: 6/5/2020    SCRIBE #1 NOTE: I, Briseyda Gradner, am scribing for, and in the presence of,  Kenneth Che DNP. I have scribed the following portions of the note - Other sections scribed: HPI, ROS, PE.       History     Chief Complaint   Patient presents with    Cough     pt. reports she has a cough and fever on last night. pt. reports she took motrin on last night. pt. is 98.3 in triage    Generalized Body Aches     pt. reports headaches and gen body aches     This is a 35 y.o. female with no pertinent PMHx who presents to the Emergency Department with a cc of a constant cough x2 weeks. Pt reports associated fever (resolved), generalized body aches, ear pain, emesis, and rash (resolved). Pt denies any SOB. Symptoms are worsened by inhaling deeply. She states that she has taken Ibuprofen for her symptoms.    The history is provided by the patient. No  was used.     Review of patient's allergies indicates:   Allergen Reactions    Clindamycin Hives and Itching    Compazine [prochlorperazine edisylate] Anxiety    Fioricet [butalbital-acetaminophen-caff] Hives and Other (See Comments)     Reaction:  "Really bad yeast infections"    Reglan [metoclopramide] Hives and Rash     Past Medical History:   Diagnosis Date    Anxiety     Bladder infection     Carpal tunnel syndrome     Depression     Hypertension     Migraine headache     Pancreatitis     UTI (lower urinary tract infection)      Past Surgical History:   Procedure Laterality Date    CHOLECYSTECTOMY      KNEE SURGERY   aug 2008    right knee    KNEE SURGERY      REDUCTION MAMMAPLASTY      Breast reduction     Family History   Problem Relation Age of Onset    Hypertension Mother     Hypertension Father     Hypertension Maternal Grandmother     Breast cancer Neg Hx     Colon cancer Neg Hx     Ovarian cancer Neg Hx      Social History     Tobacco Use    Smoking status: Former Smoker     Years: 8.00     Types: " Cigarettes    Smokeless tobacco: Never Used    Tobacco comment: has not smoked in 1 month   Substance Use Topics    Alcohol use: No    Drug use: No     Review of Systems   Constitutional: Positive for fever (resolved). Negative for chills and fatigue.   HENT: Positive for ear pain. Negative for congestion, ear discharge, postnasal drip, rhinorrhea, sinus pressure, sneezing, sore throat and voice change.    Eyes: Negative for discharge, itching and visual disturbance.   Respiratory: Positive for cough. Negative for shortness of breath and wheezing.    Cardiovascular: Negative for chest pain, palpitations and leg swelling.   Gastrointestinal: Positive for vomiting (secondary to coughing). Negative for abdominal pain, constipation, diarrhea and nausea.   Endocrine: Negative for polydipsia, polyphagia and polyuria.   Genitourinary: Negative for dysuria, frequency, hematuria, urgency, vaginal bleeding, vaginal discharge and vaginal pain.   Musculoskeletal: Positive for myalgias. Negative for arthralgias and back pain.   Skin: Positive for rash (resolved). Negative for wound.   Neurological: Negative for dizziness, seizures, syncope, weakness, numbness and headaches.   Hematological: Negative for adenopathy. Does not bruise/bleed easily.   Psychiatric/Behavioral: Negative for self-injury and suicidal ideas. The patient is not nervous/anxious.        Physical Exam     Initial Vitals [06/05/20 1455]   BP Pulse Resp Temp SpO2   (!) 202/115 (!) 133 20 98.3 °F (36.8 °C) 97 %      MAP       --         Physical Exam    Nursing note and vitals reviewed.  Constitutional: She appears well-developed and well-nourished.   HENT:   Head: Normocephalic and atraumatic.   Right Ear: External ear normal.   Left Ear: External ear normal.   Nose: Nose normal.   Mouth/Throat: Oropharynx is clear and moist.   Eyes: Conjunctivae and EOM are normal. Pupils are equal, round, and reactive to light. Right eye exhibits no discharge. Left eye  exhibits no discharge.   Neck: Normal range of motion.   Cardiovascular: Normal rate, regular rhythm, S1 normal, S2 normal and normal heart sounds. Exam reveals no gallop.    No murmur heard.  Pulmonary/Chest: Effort normal and breath sounds normal. No respiratory distress. She has no decreased breath sounds. She has no wheezes. She has no rhonchi. She has no rales.   Abdominal: Soft. Bowel sounds are normal. She exhibits no distension. There is no tenderness.   Musculoskeletal: Normal range of motion. She exhibits no edema or tenderness.   Neurological: She is alert and oriented to person, place, and time. GCS score is 15. GCS eye subscore is 4. GCS verbal subscore is 5. GCS motor subscore is 6.   Skin: Skin is warm and dry. Capillary refill takes less than 2 seconds.   Psychiatric: She has a normal mood and affect. Thought content normal.         ED Course   Procedures  Labs Reviewed   SARS-COV-2 RNA AMPLIFICATION, QUAL   POCT URINE PREGNANCY          Imaging Results          X-Ray Chest AP Portable (Final result)  Result time 06/05/20 17:21:04    Final result by Jayla Samson MD (06/05/20 17:21:04)                 Impression:      No acute cardiopulmonary process identified.      Electronically signed by: Jayla Samson MD  Date:    06/05/2020  Time:    17:21             Narrative:    EXAMINATION:  XR CHEST AP PORTABLE    CLINICAL HISTORY:  Shortness of breath    TECHNIQUE:  Single frontal view of the chest was performed.    COMPARISON:  02/22/2020.    FINDINGS:  Cardiac silhouette is normal in size.  Lungs are symmetrically expanded.  Focal increased attenuation/opacification is seen at the medial aspect of the right lower lung.  Uncertain if this may relate to vascular crowding versus atelectasis or developing pneumonia in the right clinical setting.  No evidence of pneumothorax or significant pleural effusion.  No acute osseous abnormality identified.                                       APC / Resident  Notes:   35-year-old female reports a frequent cough for the last 2 weeks.  She reports that it is worse with taking a deep breath.  She reports it is keeping her up at night and there is vomiting secondary to intense coughing spells.  She also reports shortness of breath and wheezing.  She is using albuterol at home for this problem without relief.  On physical exam the patient is afebrile and nontoxic in no apparent distress.  She is hypertensive and states she did take her medications today.  She is currently tachycardic but pulse ox is 100%.  Bilateral breath sounds are clear to auscultation and heart regular rate rhythm no murmurs clicks or rubs.  I will order a steroid injection as well and a chest x-ray.  We will monitor her blood pressure.  She currently denies headache, blurred vision, dizziness, chest pain that is not exacerbated by deep breath or cough.              ED Course as of Jun 05 1945 Fri Jun 05, 2020   1640 BP(!): 202/115 [VC]   1640 Temp: 98.3 °F (36.8 °C) [VC]   1640 Temp src: Oral [VC]   1640 Pulse(!): 133 [VC]   1640 Resp: 20 [VC]   1640 SpO2: 97 % [VC]   1640 Preg Test, Ur: Negative [VC]   1709 BP(!): 166/116 [VC]   1709 Temp: 98.9 °F (37.2 °C) [VC]   1709 Pulse(!): 111 [VC]   1709 Resp: 18 [VC]   1709 SpO2: 100 % [VC]   1739   No acute cardiopulmonary process identified.   X-Ray Chest AP Portable [VC]   1746 SARS-CoV-2 RNA, Amplification, Qual: Negative [VC]   1825 BP(!): 166/124 [VC]   1825 Temp: 98.4 °F (36.9 °C) [VC]   1825 Temp src: Oral [VC]   1825 Pulse(!): 111 [VC]   1825 Resp: 18 [VC]   1825 SpO2: 96 % [VC]   1918 BP(!): 192/112 [VC]   1918 Pulse(!): 111 [VC]      ED Course User Index  [VC] Kenneth Che, SAIRA     I believe the patient most likely has bronchitis and will be discharged home in good condition when her blood pressure is affected by the Catapres.  She should return for any worsening or changes condition and follow up with primary care provider.  She should take  over-the-counter cough remedies for her cough and was given a steroid injection in the emergency department.See above for analyses of radiology, labs, and events during pt's visit and direct actions taken. Symptomatic therapies and return precautions on AVS.   Medication choices were made after reviewing allergies, medications, history, available laboratories. See below for discharge prescriptions if any and disposition.             Clinical Impression:       ICD-10-CM ICD-9-CM   1. Bronchitis J40 490   2. SOB (shortness of breath) R06.02 786.05   3. Hypertension, unspecified type I10 401.9         Disposition:   Disposition: Discharged  Condition: Stable     ED Disposition Condition    Discharge Stable       Scribe attestation: KATIE HARDWICK DNP ACNP-BC FNP-C  , personally performed the services described in this documentation. All medical record entries made by the scribe were at my direction and in my presence.  I have reviewed the chart and agree that the record reflects my personal performance and is accurate and complete.    ED Prescriptions     Medication Sig Dispense Start Date End Date Auth. Provider    promethazine-dextromethorphan (PROMETHAZINE-DM) 6.25-15 mg/5 mL Syrp Take 5 mLs by mouth every evening. 180 mL 6/5/2020  Kenneth Che DNP        Follow-up Information     Follow up With Specialties Details Why Contact Info    Kyle Rivera Jr., MD Family Medicine Schedule an appointment as soon as possible for a visit  As needed [address] [place] [address]  [place] [address]  [phone]      [facility] Schedule an appointment as soon as possible for a visit   [address], [address] [address] [phone]                                     Kenneth Che DNP  06/05/20 1919       Kenneth Che DNP  06/05/20 3809

## 2020-06-05 NOTE — DISCHARGE INSTRUCTIONS
Continue albuterol every 4-6h.  Delsym over the counter for cough.   Return to the Emergency department for any worsening or failure to improve, otherwise follow up with your primary care provider.

## 2020-07-08 ENCOUNTER — HOSPITAL ENCOUNTER (EMERGENCY)
Facility: HOSPITAL | Age: 35
Discharge: HOME | End: 2020-07-09
Attending: EMERGENCY MEDICINE
Payer: MEDICAID

## 2020-07-08 DIAGNOSIS — R00.0 TACHYCARDIA: ICD-10-CM

## 2020-07-08 DIAGNOSIS — R10.13 EPIGASTRIC PAIN: ICD-10-CM

## 2020-07-08 DIAGNOSIS — K21.9 GASTROESOPHAGEAL REFLUX DISEASE, ESOPHAGITIS PRESENCE NOT SPECIFIED: Primary | ICD-10-CM

## 2020-07-08 LAB
ALBUMIN SERPL BCP-MCNC: 4.3 G/DL (ref 3.5–5.2)
ALP SERPL-CCNC: 92 U/L (ref 55–135)
ALT SERPL W/O P-5'-P-CCNC: 74 U/L (ref 10–44)
ANION GAP SERPL CALC-SCNC: 9 MMOL/L (ref 8–16)
AST SERPL-CCNC: 71 U/L (ref 10–40)
B-HCG UR QL: NEGATIVE
BACTERIA #/AREA URNS HPF: ABNORMAL /HPF
BASOPHILS # BLD AUTO: 0.07 K/UL (ref 0–0.2)
BASOPHILS NFR BLD: 0.5 % (ref 0–1.9)
BILIRUB SERPL-MCNC: 0.4 MG/DL (ref 0.1–1)
BILIRUB UR QL STRIP: NEGATIVE
BUN SERPL-MCNC: 12 MG/DL (ref 6–20)
CALCIUM SERPL-MCNC: 9.8 MG/DL (ref 8.7–10.5)
CHLORIDE SERPL-SCNC: 102 MMOL/L (ref 95–110)
CLARITY UR: CLEAR
CO2 SERPL-SCNC: 25 MMOL/L (ref 23–29)
COLOR UR: YELLOW
CREAT SERPL-MCNC: 0.8 MG/DL (ref 0.5–1.4)
CTP QC/QA: YES
D DIMER PPP IA.FEU-MCNC: 0.3 MG/L FEU
DIFFERENTIAL METHOD: ABNORMAL
EOSINOPHIL # BLD AUTO: 0.2 K/UL (ref 0–0.5)
EOSINOPHIL NFR BLD: 1.1 % (ref 0–8)
ERYTHROCYTE [DISTWIDTH] IN BLOOD BY AUTOMATED COUNT: 14.6 % (ref 11.5–14.5)
EST. GFR  (AFRICAN AMERICAN): >60 ML/MIN/1.73 M^2
EST. GFR  (NON AFRICAN AMERICAN): >60 ML/MIN/1.73 M^2
GLUCOSE SERPL-MCNC: 176 MG/DL (ref 70–110)
GLUCOSE UR QL STRIP: ABNORMAL
HCT VFR BLD AUTO: 46.3 % (ref 37–48.5)
HGB BLD-MCNC: 14.7 G/DL (ref 12–16)
HGB UR QL STRIP: ABNORMAL
HYALINE CASTS #/AREA URNS LPF: ABNORMAL /LPF
IMM GRANULOCYTES # BLD AUTO: 0.17 K/UL (ref 0–0.04)
IMM GRANULOCYTES NFR BLD AUTO: 1.2 % (ref 0–0.5)
KETONES UR QL STRIP: ABNORMAL
LEUKOCYTE ESTERASE UR QL STRIP: NEGATIVE
LIPASE SERPL-CCNC: 9 U/L (ref 4–60)
LYMPHOCYTES # BLD AUTO: 4.5 K/UL (ref 1–4.8)
LYMPHOCYTES NFR BLD: 31.3 % (ref 18–48)
MCH RBC QN AUTO: 26.5 PG (ref 27–31)
MCHC RBC AUTO-ENTMCNC: 31.7 G/DL (ref 32–36)
MCV RBC AUTO: 83 FL (ref 82–98)
MICROSCOPIC COMMENT: ABNORMAL
MONOCYTES # BLD AUTO: 0.9 K/UL (ref 0.3–1)
MONOCYTES NFR BLD: 6.4 % (ref 4–15)
NEUTROPHILS # BLD AUTO: 8.5 K/UL (ref 1.8–7.7)
NEUTROPHILS NFR BLD: 59.5 % (ref 38–73)
NITRITE UR QL STRIP: NEGATIVE
NRBC BLD-RTO: 0 /100 WBC
PH UR STRIP: 5 [PH] (ref 5–8)
PLATELET # BLD AUTO: 325 K/UL (ref 150–350)
PMV BLD AUTO: 9.7 FL (ref 9.2–12.9)
POTASSIUM SERPL-SCNC: 4.1 MMOL/L (ref 3.5–5.1)
PROT SERPL-MCNC: 8.2 G/DL (ref 6–8.4)
PROT UR QL STRIP: ABNORMAL
RBC # BLD AUTO: 5.55 M/UL (ref 4–5.4)
RBC #/AREA URNS HPF: 0 /HPF (ref 0–4)
SARS-COV-2 RDRP RESP QL NAA+PROBE: NEGATIVE
SODIUM SERPL-SCNC: 136 MMOL/L (ref 136–145)
SP GR UR STRIP: 1.03 (ref 1–1.03)
SQUAMOUS #/AREA URNS HPF: ABNORMAL /HPF
URN SPEC COLLECT METH UR: ABNORMAL
UROBILINOGEN UR STRIP-ACNC: NEGATIVE EU/DL
WBC # BLD AUTO: 14.36 K/UL (ref 3.9–12.7)
WBC #/AREA URNS HPF: 8 /HPF (ref 0–5)
YEAST URNS QL MICRO: ABNORMAL

## 2020-07-08 PROCEDURE — U0002 COVID-19 LAB TEST NON-CDC: HCPCS

## 2020-07-08 PROCEDURE — 93005 ELECTROCARDIOGRAM TRACING: CPT

## 2020-07-08 PROCEDURE — 25500020 PHARM REV CODE 255: Performed by: EMERGENCY MEDICINE

## 2020-07-08 PROCEDURE — 93010 EKG 12-LEAD: ICD-10-PCS | Mod: ,,, | Performed by: INTERNAL MEDICINE

## 2020-07-08 PROCEDURE — 80053 COMPREHEN METABOLIC PANEL: CPT

## 2020-07-08 PROCEDURE — 93010 ELECTROCARDIOGRAM REPORT: CPT | Mod: ,,, | Performed by: INTERNAL MEDICINE

## 2020-07-08 PROCEDURE — 83690 ASSAY OF LIPASE: CPT

## 2020-07-08 PROCEDURE — 81025 URINE PREGNANCY TEST: CPT | Performed by: EMERGENCY MEDICINE

## 2020-07-08 PROCEDURE — S0028 INJECTION, FAMOTIDINE, 20 MG: HCPCS | Performed by: EMERGENCY MEDICINE

## 2020-07-08 PROCEDURE — 63600175 PHARM REV CODE 636 W HCPCS: Performed by: EMERGENCY MEDICINE

## 2020-07-08 PROCEDURE — 99285 EMERGENCY DEPT VISIT HI MDM: CPT | Mod: 25

## 2020-07-08 PROCEDURE — 85025 COMPLETE CBC W/AUTO DIFF WBC: CPT

## 2020-07-08 PROCEDURE — 81000 URINALYSIS NONAUTO W/SCOPE: CPT

## 2020-07-08 PROCEDURE — 25000003 PHARM REV CODE 250: Performed by: EMERGENCY MEDICINE

## 2020-07-08 PROCEDURE — 96375 TX/PRO/DX INJ NEW DRUG ADDON: CPT

## 2020-07-08 PROCEDURE — 85379 FIBRIN DEGRADATION QUANT: CPT

## 2020-07-08 PROCEDURE — 25000003 PHARM REV CODE 250: Performed by: NURSE PRACTITIONER

## 2020-07-08 PROCEDURE — 96374 THER/PROPH/DIAG INJ IV PUSH: CPT | Mod: 59

## 2020-07-08 RX ORDER — MAG HYDROX/ALUMINUM HYD/SIMETH 200-200-20
30 SUSPENSION, ORAL (FINAL DOSE FORM) ORAL
Status: COMPLETED | OUTPATIENT
Start: 2020-07-08 | End: 2020-07-08

## 2020-07-08 RX ORDER — FAMOTIDINE 10 MG/ML
20 INJECTION INTRAVENOUS
Status: COMPLETED | OUTPATIENT
Start: 2020-07-08 | End: 2020-07-08

## 2020-07-08 RX ORDER — PANTOPRAZOLE SODIUM 20 MG/1
20 TABLET, DELAYED RELEASE ORAL DAILY
Qty: 30 TABLET | Refills: 0 | OUTPATIENT
Start: 2020-07-08 | End: 2021-11-29

## 2020-07-08 RX ORDER — MORPHINE SULFATE 10 MG/ML
4 INJECTION INTRAMUSCULAR; INTRAVENOUS; SUBCUTANEOUS
Status: COMPLETED | OUTPATIENT
Start: 2020-07-08 | End: 2020-07-08

## 2020-07-08 RX ADMIN — FAMOTIDINE 20 MG: 10 INJECTION INTRAVENOUS at 09:07

## 2020-07-08 RX ADMIN — ALUMINUM HYDROXIDE, MAGNESIUM HYDROXIDE, AND SIMETHICONE 30 ML: 200; 200; 20 SUSPENSION ORAL at 08:07

## 2020-07-08 RX ADMIN — IOHEXOL 85 ML: 350 INJECTION, SOLUTION INTRAVENOUS at 10:07

## 2020-07-08 RX ADMIN — MORPHINE SULFATE 4 MG: 10 INJECTION INTRAVENOUS at 09:07

## 2020-07-08 NOTE — PROVIDER PROGRESS NOTES - EMERGENCY DEPT.
" Emergency Department TeleTRIAGE Encounter Note      CHIEF COMPLAINT    Chief Complaint   Patient presents with    Gastroesophageal Reflux     Pt c/o indigestion feels like lava in throath, been dealing with it for about 1 wk, also vomiting blood (spots), No other Gi complaints.        VITAL SIGNS   Initial Vitals [07/08/20 1800]   BP Pulse Resp Temp SpO2   (!) 183/124 (!) 120 18 98.8 °F (37.1 °C) 96 %      MAP       --            ALLERGIES    Review of patient's allergies indicates:   Allergen Reactions    Clindamycin Hives and Itching    Compazine [prochlorperazine edisylate] Anxiety    Fioricet [butalbital-acetaminophen-caff] Hives and Other (See Comments)     Reaction:  "Really bad yeast infections"    Reglan [metoclopramide] Hives and Rash       PROVIDER TRIAGE NOTE  This is a teletriage evaluation of a 35 y.o. female presenting to the ED with c/o generalized abdominal pain with indigestion type sensation in throat.  Reports emesis with some blood spots.  No history of GERD.  Patient is tachycardic and hypertensive in triage vitals, she does not appear in distress.. Initial orders will be placed and care will be transferred to an alternate provider when patient is roomed for a full evaluation. Any additional orders and the final disposition will be determined by that provider.         ORDERS  Labs Reviewed - No data to display    ED Orders (720h ago, onward)    Start Ordered     Status Ordering Provider    07/08/20 1815 07/08/20 1805  aluminum & magnesium hydroxide-simethicone 400-400-40 mg/5 mL suspension 30 mL  ED 1 Time      Ordered GWEN RASMUSSEN    07/08/20 1806 07/08/20 1805  Vital signs  Every 2 hours      Ordered GWEN RASMUSSEN    07/08/20 1806 07/08/20 1805  Diet NPO  Diet effective now      Ordered GWEN RASMUSSEN    Unscheduled 07/08/20 1805  Insert peripheral IV  Once      Ordered GWEN RASMUSSEN    Unscheduled 07/08/20 1805  CBC W/ AUTO DIFFERENTIAL  STAT      Ordered GWEN RASMUSSEN    " Unscheduled 07/08/20 1805  Comp. Metabolic Panel  STAT      Ordered GWEN RASMUSSEN    Unscheduled 07/08/20 1805  Lipase  STAT      Ordered GWEN RASMUSSEN    Unscheduled 07/08/20 1805  Urinalysis, Reflex to Urine Culture Urine, Clean Catch  STAT      Ordered GWEN RASMUSSEN    Unscheduled 07/08/20 1805  EKG 12-lead  Once      Ordered GWEN RASMUSSEN            Virtual Visit Note: The provider triage portion of this emergency department evaluation and documentation was performed via [employer], a HIPAA-compliant telemedicine application, in concert with a tele-presenter in the room. A face to face patient evaluation with one of my colleagues will occur once the patient is placed in an emergency department room.      DISCLAIMER: This note was prepared with [employer] voice recognition transcription software. Garbled syntax, mangled pronouns, and other bizarre constructions may be attributed to that software system.

## 2020-07-09 VITALS
HEIGHT: 62 IN | BODY MASS INDEX: 34.04 KG/M2 | DIASTOLIC BLOOD PRESSURE: 80 MMHG | TEMPERATURE: 99 F | HEART RATE: 96 BPM | RESPIRATION RATE: 19 BRPM | SYSTOLIC BLOOD PRESSURE: 115 MMHG | OXYGEN SATURATION: 97 % | WEIGHT: 185 LBS

## 2020-07-09 NOTE — ED PROVIDER NOTES
"Encounter Date: 7/8/2020    SCRIBE #1 NOTE: I, Tyrell Betts, am scribing for, and in the presence of, Timi Noriega MD.       History     Chief Complaint   Patient presents with    Gastroesophageal Reflux     Pt c/o indigestion feels like lava in throath, been dealing with it for about 1 wk, also vomiting blood (spots), No other Gi complaints.      Samantha Sr is a 35 year old female who presents to the Emergency Department complaining of blood in her vomit since yesterday. She states that she has been experiencing indigestion for 1 weeks. The patient reports feeling epigastric abdominal pain and cramping. She states that she has never felt these symptoms before. She reports taking tums and pepsid without relief. She denies any blood in stool or fever.     The history is provided by the patient. No  was used.     Review of patient's allergies indicates:   Allergen Reactions    Clindamycin Hives and Itching    Compazine [prochlorperazine edisylate] Anxiety    Fioricet [butalbital-acetaminophen-caff] Hives and Other (See Comments)     Reaction:  "Really bad yeast infections"    Reglan [metoclopramide] Hives and Rash     Past Medical History:   Diagnosis Date    Anxiety     Bladder infection     Carpal tunnel syndrome     Depression     Hypertension     Migraine headache     Pancreatitis     UTI (lower urinary tract infection)      Past Surgical History:   Procedure Laterality Date    CHOLECYSTECTOMY      KNEE SURGERY   aug 2008    right knee    KNEE SURGERY      REDUCTION MAMMAPLASTY      Breast reduction     Family History   Problem Relation Age of Onset    Hypertension Mother     Hypertension Father     Hypertension Maternal Grandmother     Breast cancer Neg Hx     Colon cancer Neg Hx     Ovarian cancer Neg Hx      Social History     Tobacco Use    Smoking status: Former Smoker     Years: 8.00     Types: Cigarettes    Smokeless tobacco: Never Used    Tobacco " comment: has not smoked in 1 month   Substance Use Topics    Alcohol use: No    Drug use: No     Review of Systems   Constitutional: Negative for fever.   HENT: Negative for congestion.    Eyes: Negative for visual disturbance.   Respiratory: Negative for shortness of breath.    Cardiovascular: Negative for chest pain.   Gastrointestinal: Positive for abdominal pain and vomiting. Negative for blood in stool.   Musculoskeletal: Negative for back pain.   Skin: Negative for rash.   Neurological: Negative for facial asymmetry.   Psychiatric/Behavioral: Negative for behavioral problems.       Physical Exam     Initial Vitals [07/08/20 1800]   BP Pulse Resp Temp SpO2   (!) 183/124 (!) 120 18 98.8 °F (37.1 °C) 96 %      MAP       --         Physical Exam    Nursing note and vitals reviewed.  Constitutional: She appears well-developed and well-nourished. She is not diaphoretic. No distress.   HENT:   Head: Normocephalic and atraumatic.   Nose: Nose normal.   Eyes: Conjunctivae and EOM are normal. Pupils are equal, round, and reactive to light. No scleral icterus.   Neck: Normal range of motion. Neck supple.   Cardiovascular: Normal rate, regular rhythm, normal heart sounds and intact distal pulses. Exam reveals no gallop and no friction rub.    No murmur heard.  Pulmonary/Chest: Breath sounds normal. No stridor. No respiratory distress. She has no wheezes. She has no rhonchi. She has no rales.   Abdominal: Soft. Normal appearance and bowel sounds are normal. She exhibits no distension. There is no abdominal tenderness. There is no rebound and no guarding.   Musculoskeletal: Normal range of motion. No tenderness or edema.   Neurological: She is alert and oriented to person, place, and time. She has normal strength. No cranial nerve deficit.   Skin: Skin is warm and dry. No rash noted.   Psychiatric: She has a normal mood and affect. Her behavior is normal.         ED Course   Procedures  Labs Reviewed   CBC W/ AUTO  DIFFERENTIAL - Abnormal; Notable for the following components:       Result Value    WBC 14.36 (*)     RBC 5.55 (*)     Mean Corpuscular Hemoglobin 26.5 (*)     Mean Corpuscular Hemoglobin Conc 31.7 (*)     RDW 14.6 (*)     Immature Granulocytes 1.2 (*)     Gran # (ANC) 8.5 (*)     Immature Grans (Abs) 0.17 (*)     All other components within normal limits   COMPREHENSIVE METABOLIC PANEL - Abnormal; Notable for the following components:    Glucose 176 (*)     AST 71 (*)     ALT 74 (*)     All other components within normal limits   URINALYSIS, REFLEX TO URINE CULTURE - Abnormal; Notable for the following components:    Protein, UA 1+ (*)     Glucose, UA 3+ (*)     Ketones, UA Trace (*)     Occult Blood UA 1+ (*)     All other components within normal limits    Narrative:     Specimen Source->Urine   URINALYSIS MICROSCOPIC - Abnormal; Notable for the following components:    WBC, UA 8 (*)     All other components within normal limits    Narrative:     Specimen Source->Urine   LIPASE   D DIMER, QUANTITATIVE   SARS-COV-2 RNA AMPLIFICATION, QUAL   POCT URINE PREGNANCY     EKG Readings: (Independently Interpreted)   Initial Reading: No STEMI. Rhythm: Sinus Tachycardia. Heart Rate: 106.   Rightward axis     ECG Results          EKG 12-lead (Final result)  Result time 07/09/20 09:29:36    Final result by Interface, Lab [facility] (07/09/20 09:29:36)                 Narrative:    Test Reason : R00.0,    Vent. Rate : 106 BPM     Atrial Rate : 106 BPM     P-R Int : 144 ms          QRS Dur : 078 ms      QT Int : 324 ms       P-R-T Axes : 052 091 066 degrees     QTc Int : 430 ms    Sinus tachycardia  Rightward axis  Borderline Abnormal ECG  When compared with ECG of 06-MAR-2020 17:03,  Significant changes have occurred  Confirmed by Zeferino Gutierres MD ([id number]) on 7/9/2020 9:29:27 AM    Referred By: [email or web address]   SELF           Confirmed By:Zeferino Gutierres MD                            Imaging Results          CT Abdomen Pelvis  With Contrast (Final result)  Result time 07/08/20 23:07:29    Final result by Ronnie Ovalle MD (07/08/20 23:07:29)                 Impression:      Status post cholecystectomy.  No abnormality in the gallbladder fossa.    Hepatic steatosis.  Suggest correlation with LFTs.    3.7 x 1.7 cm low-attenuation lesion in the left adnexa.  Pelvic ultrasound may be obtained for further evaluation.      Electronically signed by: Ronnie Ovalle MD  Date:    07/08/2020  Time:    23:07             Narrative:    EXAMINATION:  CT ABDOMEN PELVIS WITH CONTRAST    CLINICAL HISTORY:  Epigastric pain;    TECHNIQUE:  Low dose axial images, sagittal and coronal reformations were obtained from the lung bases to the pubic symphysis following the IV administration of 85 mL of Omnipaque 350 .  Oral contrast was not given.    COMPARISON:  CT scan abdomen pelvis dated 02/12/2019.    FINDINGS:  There are no pleural effusions.  There is no evidence of a pneumothorax.  There is no evidence of pneumomediastinum.  No airspace opacity is present.    The heart is unremarkable.  There is normal tapering of the abdominal aorta.  The celiac trunk, SMA, and CONRAD are within normal limits.  The portal veins and mesenteric vessels are within normal limits.  The IVC and the remainder of the venous structures are within normal limits.  There is no evidence of lymphadenopathy in the abdomen or pelvis.  There is unchanged appearance of a calcified perigastric lymph node.    The esophagus, stomach, and duodenum are within normal limits.  The small bowel loops are unremarkable.  The appendix is within normal limits.  The large bowel is unremarkable.  There is no evidence of bowel obstruction.    There is hepatic steatosis.  The patient is status post cholecystectomy.  There is no abnormality in the gallbladder fossa.  The biliary tree is within normal limits.  The spleen is unremarkable.  The pancreas is within normal limits.    The adrenal glands are unremarkable.   The kidneys are within normal limits.  The ureters and urinary bladder are within normal limits.  The uterus is unremarkable.  There is a 3.7 x 3.2 cm left-sided adnexal cystic structure with Hounsfield unit of 11.    There is no evidence of free fluid in the abdomen or pelvis.  There is no evidence of free air.  There is no evidence of pneumatosis.  No portal venous air is identified.    The psoas margins are unremarkable.  There is an umbilical hernia containing omental fat.  The remainder abdominal wall is within normal limits.  The osseous structures are unremarkable.                                 Medical Decision Making:   Initial Assessment:   Patient presenting with epigastric pain, nausea, vomiting.  Reports flecks of blood.  On exam well-appearing in no acute distress.  Guaiac negative.  Hemoglobin normal.  Tachycardia improved with GI cocktail morphine.  D-dimer negative, doubt PE or DVT.  CT abdomen pelvis unrevealing.  Suspect GERD, gastritis.  Doubt biliary disease, diverticulitis, obstruction.  Believe she is safe for discharge home.  Discussed return precautions as well as need for primary care follow-up.            Scribe Attestation:   Scribe #1: I performed the above scribed service and the documentation accurately describes the services I performed. I attest to the accuracy of the note.                          Clinical Impression:       ICD-10-CM ICD-9-CM   1. Gastroesophageal reflux disease, esophagitis presence not specified  K21.9 530.81   2. Tachycardia  R00.0 785.0   3. Epigastric pain  R10.13 789.06             ED Disposition Condition    Discharge Stable        ED Prescriptions     Medication Sig Dispense Start Date End Date Auth. Provider    pantoprazole (PROTONIX) 20 MG tablet Take 1 tablet (20 mg total) by mouth once daily. 30 tablet 7/8/2020 8/7/2020 Timi Noriega MD        Follow-up Information     Follow up With Specialties Details Why Contact Info    Kyle Rivera Jr.,  MD Family Medicine Schedule an appointment as soon as possible for a visit   [address] [place] [address] [place] [address]  [phone]                        I, Timi Noriega, personally performed the services described in this documentation. All medical record entries made by the scribe were at my direction and in my presence. I have reviewed the chart and agree that the record reflects my personal performance and is accurate and complete.       Timi Noriega MD  07/09/20 5913

## 2020-07-09 NOTE — ED TRIAGE NOTES
"Patient presented to the ED stating that she has been having a cough x2weeks and body aches. Patient stated that she has been "in and out" of hospitals for the cough and she is getting no relief. Patient denies NVD or fever. Patient stated having HA.  "

## 2020-07-09 NOTE — DISCHARGE INSTRUCTIONS
You were seen in the emergency department for abdominal pain similar to your prior episodes of abomdinal pain. Your labs, exam, and vitals are reassuring. We gave you some pain medication and you felt better.  We think you're safe to go home.  Please follow-up with your primary care provider.  Please return for any new or worsening pain, black or bloody stool, persistent nausea and vomiting, fevers, chills, dizziness, or you become concerned in any other way.

## 2021-04-08 NOTE — ASSESSMENT & PLAN NOTE
34 year old female with PMH of HTN, obesity, depression, and tobacco abuse presents with headache x 4 days; pt states different than her usual migraine headaches in severity and length; denies fevers or chills; has associated neck tightness and dizziness. Pt also suffering from allergies.     Pt is afebrile. WBC up to 23K today, however, pt was given steroids.  Now s/p LP- 3 WBCs seen. Cultures sent.     Plan:  1. Recommend stopping antibiotics at this time given negative LP  2. Migraine management per primary team  3. Ordered RPR as part of headache work up though low suspicion; f/u results with PCP  4. Will sign off   no

## 2021-05-02 ENCOUNTER — HOSPITAL ENCOUNTER (EMERGENCY)
Facility: HOSPITAL | Age: 36
Discharge: HOME | End: 2021-05-02
Attending: EMERGENCY MEDICINE
Payer: MEDICAID

## 2021-05-02 VITALS
HEART RATE: 91 BPM | SYSTOLIC BLOOD PRESSURE: 144 MMHG | HEIGHT: 62 IN | OXYGEN SATURATION: 100 % | RESPIRATION RATE: 18 BRPM | DIASTOLIC BLOOD PRESSURE: 89 MMHG | WEIGHT: 185 LBS | TEMPERATURE: 98 F | BODY MASS INDEX: 34.04 KG/M2

## 2021-05-02 DIAGNOSIS — R10.13 EPIGASTRIC PAIN: Primary | ICD-10-CM

## 2021-05-02 LAB
ALBUMIN SERPL BCP-MCNC: 4.2 G/DL (ref 3.5–5.2)
ALLENS TEST: ABNORMAL
ALP SERPL-CCNC: 96 U/L (ref 55–135)
ALT SERPL W/O P-5'-P-CCNC: 22 U/L (ref 10–44)
ANION GAP SERPL CALC-SCNC: 11 MMOL/L (ref 8–16)
AST SERPL-CCNC: 24 U/L (ref 10–40)
B-HCG UR QL: NEGATIVE
B-OH-BUTYR BLD STRIP-SCNC: 0.1 MMOL/L (ref 0–0.5)
BASOPHILS # BLD AUTO: 0.07 K/UL (ref 0–0.2)
BASOPHILS NFR BLD: 0.5 % (ref 0–1.9)
BILIRUB SERPL-MCNC: 0.3 MG/DL (ref 0.1–1)
BILIRUB UR QL STRIP: NEGATIVE
BUN SERPL-MCNC: 7 MG/DL (ref 6–20)
CALCIUM SERPL-MCNC: 9.3 MG/DL (ref 8.7–10.5)
CHLORIDE SERPL-SCNC: 106 MMOL/L (ref 95–110)
CLARITY UR: CLEAR
CO2 SERPL-SCNC: 20 MMOL/L (ref 23–29)
COLOR UR: COLORLESS
CREAT SERPL-MCNC: 0.7 MG/DL (ref 0.5–1.4)
CTP QC/QA: YES
DELSYS: ABNORMAL
DIFFERENTIAL METHOD: ABNORMAL
EOSINOPHIL # BLD AUTO: 0.2 K/UL (ref 0–0.5)
EOSINOPHIL NFR BLD: 1.4 % (ref 0–8)
ERYTHROCYTE [DISTWIDTH] IN BLOOD BY AUTOMATED COUNT: 13.2 % (ref 11.5–14.5)
ERYTHROCYTE [SEDIMENTATION RATE] IN BLOOD BY WESTERGREN METHOD: 16 MM/H
EST. GFR  (AFRICAN AMERICAN): >60 ML/MIN/1.73 M^2
EST. GFR  (NON AFRICAN AMERICAN): >60 ML/MIN/1.73 M^2
FIO2: 21
GLUCOSE SERPL-MCNC: 117 MG/DL (ref 70–110)
GLUCOSE UR QL STRIP: NEGATIVE
HCO3 UR-SCNC: 20.1 MMOL/L (ref 24–28)
HCT VFR BLD AUTO: 36.8 % (ref 37–48.5)
HGB BLD-MCNC: 12.1 G/DL (ref 12–16)
HGB UR QL STRIP: NEGATIVE
IMM GRANULOCYTES # BLD AUTO: 0.11 K/UL (ref 0–0.04)
IMM GRANULOCYTES NFR BLD AUTO: 0.7 % (ref 0–0.5)
KETONES UR QL STRIP: NEGATIVE
LEUKOCYTE ESTERASE UR QL STRIP: NEGATIVE
LIPASE SERPL-CCNC: 20 U/L (ref 4–60)
LYMPHOCYTES # BLD AUTO: 5.1 K/UL (ref 1–4.8)
LYMPHOCYTES NFR BLD: 34.3 % (ref 18–48)
MAGNESIUM SERPL-MCNC: 2 MG/DL (ref 1.6–2.6)
MCH RBC QN AUTO: 27.8 PG (ref 27–31)
MCHC RBC AUTO-ENTMCNC: 32.9 G/DL (ref 32–36)
MCV RBC AUTO: 85 FL (ref 82–98)
MODE: ABNORMAL
MONOCYTES # BLD AUTO: 0.9 K/UL (ref 0.3–1)
MONOCYTES NFR BLD: 5.8 % (ref 4–15)
NEUTROPHILS # BLD AUTO: 8.5 K/UL (ref 1.8–7.7)
NEUTROPHILS NFR BLD: 57.3 % (ref 38–73)
NITRITE UR QL STRIP: NEGATIVE
NRBC BLD-RTO: 0 /100 WBC
PCO2 BLDA: 33.1 MMHG (ref 35–45)
PH SMN: 7.39 [PH] (ref 7.35–7.45)
PH UR STRIP: 6 [PH] (ref 5–8)
PLATELET # BLD AUTO: 428 K/UL (ref 150–450)
PMV BLD AUTO: 9 FL (ref 9.2–12.9)
PO2 BLDA: 47 MMHG (ref 40–60)
POC BE: -4 MMOL/L
POC SATURATED O2: 83 % (ref 95–100)
POC TCO2: 21 MMOL/L (ref 24–29)
POTASSIUM SERPL-SCNC: 4.1 MMOL/L (ref 3.5–5.1)
PROT SERPL-MCNC: 8.3 G/DL (ref 6–8.4)
PROT UR QL STRIP: NEGATIVE
RBC # BLD AUTO: 4.35 M/UL (ref 4–5.4)
SAMPLE: ABNORMAL
SITE: ABNORMAL
SODIUM SERPL-SCNC: 137 MMOL/L (ref 136–145)
SP GR UR STRIP: 1.01 (ref 1–1.03)
URN SPEC COLLECT METH UR: ABNORMAL
UROBILINOGEN UR STRIP-ACNC: NEGATIVE EU/DL
WBC # BLD AUTO: 14.9 K/UL (ref 3.9–12.7)

## 2021-05-02 PROCEDURE — 99900035 HC TECH TIME PER 15 MIN (STAT)

## 2021-05-02 PROCEDURE — 99285 EMERGENCY DEPT VISIT HI MDM: CPT | Mod: 25

## 2021-05-02 PROCEDURE — 81003 URINALYSIS AUTO W/O SCOPE: CPT | Performed by: PHYSICIAN ASSISTANT

## 2021-05-02 PROCEDURE — 96361 HYDRATE IV INFUSION ADD-ON: CPT

## 2021-05-02 PROCEDURE — 96375 TX/PRO/DX INJ NEW DRUG ADDON: CPT

## 2021-05-02 PROCEDURE — 85025 COMPLETE CBC W/AUTO DIFF WBC: CPT | Performed by: PHYSICIAN ASSISTANT

## 2021-05-02 PROCEDURE — 63600175 PHARM REV CODE 636 W HCPCS: Performed by: PHYSICIAN ASSISTANT

## 2021-05-02 PROCEDURE — 93010 ELECTROCARDIOGRAM REPORT: CPT | Mod: ,,, | Performed by: INTERNAL MEDICINE

## 2021-05-02 PROCEDURE — 80053 COMPREHEN METABOLIC PANEL: CPT | Performed by: PHYSICIAN ASSISTANT

## 2021-05-02 PROCEDURE — 82803 BLOOD GASES ANY COMBINATION: CPT

## 2021-05-02 PROCEDURE — 93005 ELECTROCARDIOGRAM TRACING: CPT

## 2021-05-02 PROCEDURE — 81025 URINE PREGNANCY TEST: CPT | Performed by: PHYSICIAN ASSISTANT

## 2021-05-02 PROCEDURE — 83036 HEMOGLOBIN GLYCOSYLATED A1C: CPT | Performed by: PHYSICIAN ASSISTANT

## 2021-05-02 PROCEDURE — 82010 KETONE BODYS QUAN: CPT | Performed by: PHYSICIAN ASSISTANT

## 2021-05-02 PROCEDURE — 93010 EKG 12-LEAD: ICD-10-PCS | Mod: ,,, | Performed by: INTERNAL MEDICINE

## 2021-05-02 PROCEDURE — 83735 ASSAY OF MAGNESIUM: CPT | Performed by: PHYSICIAN ASSISTANT

## 2021-05-02 PROCEDURE — 96374 THER/PROPH/DIAG INJ IV PUSH: CPT

## 2021-05-02 PROCEDURE — 83690 ASSAY OF LIPASE: CPT | Performed by: PHYSICIAN ASSISTANT

## 2021-05-02 PROCEDURE — 25000003 PHARM REV CODE 250: Performed by: PHYSICIAN ASSISTANT

## 2021-05-02 RX ORDER — ONDANSETRON 4 MG/1
4 TABLET, ORALLY DISINTEGRATING ORAL
Status: COMPLETED | OUTPATIENT
Start: 2021-05-02 | End: 2021-05-02

## 2021-05-02 RX ORDER — MORPHINE SULFATE 4 MG/ML
4 INJECTION, SOLUTION INTRAMUSCULAR; INTRAVENOUS
Status: COMPLETED | OUTPATIENT
Start: 2021-05-02 | End: 2021-05-02

## 2021-05-02 RX ORDER — ONDANSETRON 4 MG/1
4 TABLET, ORALLY DISINTEGRATING ORAL EVERY 6 HOURS PRN
Qty: 20 TABLET | Refills: 0 | Status: SHIPPED | OUTPATIENT
Start: 2021-05-02 | End: 2021-05-06

## 2021-05-02 RX ORDER — FAMOTIDINE 10 MG/ML
20 INJECTION INTRAVENOUS
Status: COMPLETED | OUTPATIENT
Start: 2021-05-02 | End: 2021-05-02

## 2021-05-02 RX ADMIN — FAMOTIDINE 20 MG: 10 INJECTION INTRAVENOUS at 09:05

## 2021-05-02 RX ADMIN — LIDOCAINE HYDROCHLORIDE: 20 SOLUTION ORAL; TOPICAL at 07:05

## 2021-05-02 RX ADMIN — SODIUM CHLORIDE 1000 ML: 0.9 INJECTION, SOLUTION INTRAVENOUS at 09:05

## 2021-05-02 RX ADMIN — ONDANSETRON 4 MG: 4 TABLET, ORALLY DISINTEGRATING ORAL at 07:05

## 2021-05-02 RX ADMIN — MORPHINE SULFATE 4 MG: 4 INJECTION INTRAVENOUS at 10:05

## 2021-05-03 LAB
ESTIMATED AVG GLUCOSE: 126 MG/DL (ref 68–131)
HBA1C MFR BLD: 6 % (ref 4–5.6)

## 2021-05-06 ENCOUNTER — TELEPHONE (OUTPATIENT)
Dept: EMERGENCY MEDICINE | Facility: HOSPITAL | Age: 36
End: 2021-05-06

## 2021-05-06 RX ORDER — ONDANSETRON 4 MG/1
4 TABLET, ORALLY DISINTEGRATING ORAL EVERY 6 HOURS PRN
Qty: 20 TABLET | Refills: 0 | OUTPATIENT
Start: 2021-05-06 | End: 2022-12-05

## 2021-07-01 ENCOUNTER — PATIENT MESSAGE (OUTPATIENT)
Dept: ADMINISTRATIVE | Facility: OTHER | Age: 36
End: 2021-07-01

## 2021-08-15 ENCOUNTER — HOSPITAL ENCOUNTER (EMERGENCY)
Facility: HOSPITAL | Age: 36
Discharge: HOME | End: 2021-08-15
Attending: EMERGENCY MEDICINE
Payer: MEDICAID

## 2021-08-15 VITALS
HEIGHT: 61 IN | BODY MASS INDEX: 34.93 KG/M2 | TEMPERATURE: 99 F | HEART RATE: 92 BPM | DIASTOLIC BLOOD PRESSURE: 89 MMHG | RESPIRATION RATE: 18 BRPM | WEIGHT: 185 LBS | OXYGEN SATURATION: 98 % | SYSTOLIC BLOOD PRESSURE: 142 MMHG

## 2021-08-15 DIAGNOSIS — B34.9 VIRAL ILLNESS: Primary | ICD-10-CM

## 2021-08-15 LAB
B-HCG UR QL: NEGATIVE
CTP QC/QA: YES
CTP QC/QA: YES
POCT GLUCOSE: 128 MG/DL (ref 70–110)
SARS-COV-2 RDRP RESP QL NAA+PROBE: NEGATIVE

## 2021-08-15 PROCEDURE — U0002 COVID-19 LAB TEST NON-CDC: HCPCS | Mod: ER | Performed by: EMERGENCY MEDICINE

## 2021-08-15 PROCEDURE — 99284 EMERGENCY DEPT VISIT MOD MDM: CPT | Mod: 25,ER

## 2021-08-15 PROCEDURE — 25000003 PHARM REV CODE 250: Mod: ER | Performed by: EMERGENCY MEDICINE

## 2021-08-15 PROCEDURE — 82962 GLUCOSE BLOOD TEST: CPT | Mod: ER

## 2021-08-15 PROCEDURE — 81025 URINE PREGNANCY TEST: CPT | Mod: ER | Performed by: EMERGENCY MEDICINE

## 2021-08-15 RX ORDER — FAMOTIDINE 20 MG/1
20 TABLET, FILM COATED ORAL
COMMUNITY

## 2021-08-15 RX ORDER — FLUOXETINE 10 MG/1
10 CAPSULE ORAL DAILY
COMMUNITY
Start: 2021-04-12

## 2021-08-15 RX ORDER — PREDNISONE 10 MG/1
10 TABLET ORAL DAILY
Qty: 5 TABLET | Refills: 0 | Status: SHIPPED | OUTPATIENT
Start: 2021-08-15 | End: 2021-08-20

## 2021-08-15 RX ORDER — ONDANSETRON 4 MG/1
4 TABLET, ORALLY DISINTEGRATING ORAL EVERY 6 HOURS PRN
Qty: 10 TABLET | Refills: 0 | OUTPATIENT
Start: 2021-08-15 | End: 2022-12-05

## 2021-08-15 RX ORDER — OMEGA-3 FATTY ACIDS 1000 MG
2 CAPSULE ORAL
COMMUNITY

## 2021-08-15 RX ORDER — FERROUS SULFATE 325(65) MG
325 TABLET ORAL
COMMUNITY

## 2021-08-15 RX ORDER — METFORMIN HYDROCHLORIDE 500 MG/1
500 TABLET ORAL
COMMUNITY

## 2021-08-15 RX ORDER — CYCLOBENZAPRINE HCL 5 MG
5 TABLET ORAL
COMMUNITY

## 2021-08-15 RX ORDER — MIRTAZAPINE 7.5 MG/1
7.5 TABLET, FILM COATED ORAL
COMMUNITY

## 2021-08-15 RX ORDER — AMLODIPINE AND BENAZEPRIL HYDROCHLORIDE 10; 20 MG/1; MG/1
1 CAPSULE ORAL DAILY
COMMUNITY
Start: 2021-04-16

## 2021-08-15 RX ORDER — GABAPENTIN 300 MG/1
300 CAPSULE ORAL
COMMUNITY

## 2021-08-15 RX ORDER — ONDANSETRON 4 MG/1
4 TABLET, ORALLY DISINTEGRATING ORAL
Status: COMPLETED | OUTPATIENT
Start: 2021-08-15 | End: 2021-08-15

## 2021-08-15 RX ORDER — INSULIN ASPART 100 [IU]/ML
8 INJECTION, SOLUTION INTRAVENOUS; SUBCUTANEOUS
COMMUNITY
Start: 2020-10-13

## 2021-08-15 RX ADMIN — ONDANSETRON 4 MG: 4 TABLET, ORALLY DISINTEGRATING ORAL at 12:08

## 2021-10-12 ENCOUNTER — TELEPHONE (OUTPATIENT)
Dept: DERMATOLOGY | Facility: CLINIC | Age: 36
End: 2021-10-12
Payer: MEDICAID

## 2021-11-29 ENCOUNTER — HOSPITAL ENCOUNTER (EMERGENCY)
Facility: HOSPITAL | Age: 36
Discharge: HOME | End: 2021-11-29
Attending: EMERGENCY MEDICINE
Payer: MEDICAID

## 2021-11-29 VITALS
OXYGEN SATURATION: 97 % | HEART RATE: 99 BPM | BODY MASS INDEX: 35.87 KG/M2 | TEMPERATURE: 98 F | HEIGHT: 61 IN | RESPIRATION RATE: 17 BRPM | WEIGHT: 190 LBS | DIASTOLIC BLOOD PRESSURE: 71 MMHG | SYSTOLIC BLOOD PRESSURE: 143 MMHG

## 2021-11-29 DIAGNOSIS — J32.9 RHINOSINUSITIS: ICD-10-CM

## 2021-11-29 DIAGNOSIS — J40 BRONCHITIS: Primary | ICD-10-CM

## 2021-11-29 DIAGNOSIS — R05.9 COUGH: ICD-10-CM

## 2021-11-29 LAB
B-HCG UR QL: NEGATIVE
CTP QC/QA: YES

## 2021-11-29 PROCEDURE — 99284 EMERGENCY DEPT VISIT MOD MDM: CPT | Mod: 25,ER

## 2021-11-29 PROCEDURE — 81025 URINE PREGNANCY TEST: CPT | Mod: ER | Performed by: EMERGENCY MEDICINE

## 2021-11-29 RX ORDER — AZITHROMYCIN 250 MG/1
250 TABLET, FILM COATED ORAL DAILY
Qty: 6 TABLET | Refills: 0 | Status: SHIPPED | OUTPATIENT
Start: 2021-11-29

## 2021-11-29 RX ORDER — ALBUTEROL SULFATE 90 UG/1
2 AEROSOL, METERED RESPIRATORY (INHALATION) EVERY 6 HOURS PRN
Qty: 18 G | Refills: 0 | Status: SHIPPED | OUTPATIENT
Start: 2021-11-29

## 2021-11-29 RX ORDER — PANTOPRAZOLE SODIUM 20 MG/1
20 TABLET, DELAYED RELEASE ORAL DAILY
Qty: 30 TABLET | Refills: 0 | Status: SHIPPED | OUTPATIENT
Start: 2021-11-29 | End: 2022-11-29

## 2021-11-29 RX ORDER — PREDNISONE 20 MG/1
40 TABLET ORAL DAILY
Qty: 10 TABLET | Refills: 0 | Status: SHIPPED | OUTPATIENT
Start: 2021-11-29 | End: 2021-12-04

## 2021-11-29 RX ORDER — OLOPATADINE HYDROCHLORIDE 2 MG/ML
1 SOLUTION/ DROPS OPHTHALMIC DAILY
Qty: 5 ML | Refills: 0 | Status: SHIPPED | OUTPATIENT
Start: 2021-11-29 | End: 2022-11-29

## 2021-11-29 RX ORDER — IPRATROPIUM BROMIDE AND ALBUTEROL SULFATE 2.5; .5 MG/3ML; MG/3ML
3 SOLUTION RESPIRATORY (INHALATION) EVERY 6 HOURS PRN
Qty: 50 EACH | Refills: 0 | Status: SHIPPED | OUTPATIENT
Start: 2021-11-29 | End: 2022-11-29

## 2021-11-29 RX ORDER — BENZONATATE 100 MG/1
100 CAPSULE ORAL 3 TIMES DAILY PRN
Qty: 20 CAPSULE | Refills: 0 | Status: SHIPPED | OUTPATIENT
Start: 2021-11-29 | End: 2021-12-09

## 2021-11-29 RX ORDER — MONTELUKAST SODIUM 10 MG/1
10 TABLET ORAL NIGHTLY
Qty: 30 TABLET | Refills: 0 | Status: SHIPPED | OUTPATIENT
Start: 2021-11-29 | End: 2021-12-29

## 2021-11-29 RX ORDER — PROMETHAZINE HYDROCHLORIDE AND DEXTROMETHORPHAN HYDROBROMIDE 6.25; 15 MG/5ML; MG/5ML
5 SYRUP ORAL NIGHTLY PRN
Qty: 118 ML | Refills: 0 | Status: SHIPPED | OUTPATIENT
Start: 2021-11-29 | End: 2021-12-09

## 2021-11-29 RX ORDER — LORATADINE 10 MG/1
10 TABLET ORAL EVERY MORNING
Qty: 60 TABLET | Refills: 0 | Status: SHIPPED | OUTPATIENT
Start: 2021-11-29 | End: 2022-11-29

## 2021-11-29 RX ORDER — FLUTICASONE PROPIONATE 50 MCG
2 SPRAY, SUSPENSION (ML) NASAL DAILY
Qty: 16 G | Refills: 0 | Status: SHIPPED | OUTPATIENT
Start: 2021-11-29

## 2022-03-31 ENCOUNTER — HOSPITAL ENCOUNTER (EMERGENCY)
Facility: HOSPITAL | Age: 37
Discharge: HOME | End: 2022-03-31
Attending: STUDENT IN AN ORGANIZED HEALTH CARE EDUCATION/TRAINING PROGRAM
Payer: MEDICAID

## 2022-03-31 VITALS
HEIGHT: 61 IN | DIASTOLIC BLOOD PRESSURE: 70 MMHG | SYSTOLIC BLOOD PRESSURE: 110 MMHG | BODY MASS INDEX: 35.87 KG/M2 | HEART RATE: 81 BPM | TEMPERATURE: 99 F | WEIGHT: 190 LBS | OXYGEN SATURATION: 100 % | RESPIRATION RATE: 16 BRPM

## 2022-03-31 DIAGNOSIS — F41.9 ANXIETY: Primary | ICD-10-CM

## 2022-03-31 DIAGNOSIS — R07.9 CHEST PAIN: ICD-10-CM

## 2022-03-31 LAB
ALBUMIN SERPL BCP-MCNC: 4.1 G/DL (ref 3.5–5.2)
ALP SERPL-CCNC: 106 U/L (ref 55–135)
ALT SERPL W/O P-5'-P-CCNC: 18 U/L (ref 10–44)
ANION GAP SERPL CALC-SCNC: 10 MMOL/L (ref 8–16)
AST SERPL-CCNC: 16 U/L (ref 10–40)
B-HCG UR QL: NEGATIVE
BASOPHILS # BLD AUTO: 0.06 K/UL (ref 0–0.2)
BASOPHILS NFR BLD: 0.4 % (ref 0–1.9)
BILIRUB SERPL-MCNC: 0.2 MG/DL (ref 0.1–1)
BILIRUB UR QL STRIP: NEGATIVE
BNP SERPL-MCNC: <10 PG/ML (ref 0–99)
BUN SERPL-MCNC: 15 MG/DL (ref 6–20)
CALCIUM SERPL-MCNC: 9.6 MG/DL (ref 8.7–10.5)
CHLORIDE SERPL-SCNC: 105 MMOL/L (ref 95–110)
CLARITY UR: CLEAR
CO2 SERPL-SCNC: 23 MMOL/L (ref 23–29)
COLOR UR: COLORLESS
CREAT SERPL-MCNC: 0.8 MG/DL (ref 0.5–1.4)
CTP QC/QA: YES
CTP QC/QA: YES
DIFFERENTIAL METHOD: ABNORMAL
EOSINOPHIL # BLD AUTO: 0.3 K/UL (ref 0–0.5)
EOSINOPHIL NFR BLD: 1.9 % (ref 0–8)
ERYTHROCYTE [DISTWIDTH] IN BLOOD BY AUTOMATED COUNT: 11.9 % (ref 11.5–14.5)
EST. GFR  (AFRICAN AMERICAN): >60 ML/MIN/1.73 M^2
EST. GFR  (NON AFRICAN AMERICAN): >60 ML/MIN/1.73 M^2
GLUCOSE SERPL-MCNC: 116 MG/DL (ref 70–110)
GLUCOSE UR QL STRIP: NEGATIVE
HCT VFR BLD AUTO: 38.3 % (ref 37–48.5)
HGB BLD-MCNC: 13.2 G/DL (ref 12–16)
HGB UR QL STRIP: NEGATIVE
IMM GRANULOCYTES # BLD AUTO: 0.1 K/UL (ref 0–0.04)
IMM GRANULOCYTES NFR BLD AUTO: 0.6 % (ref 0–0.5)
KETONES UR QL STRIP: NEGATIVE
LEUKOCYTE ESTERASE UR QL STRIP: NEGATIVE
LYMPHOCYTES # BLD AUTO: 4.3 K/UL (ref 1–4.8)
LYMPHOCYTES NFR BLD: 26.5 % (ref 18–48)
MCH RBC QN AUTO: 31.2 PG (ref 27–31)
MCHC RBC AUTO-ENTMCNC: 34.5 G/DL (ref 32–36)
MCV RBC AUTO: 91 FL (ref 82–98)
MONOCYTES # BLD AUTO: 0.8 K/UL (ref 0.3–1)
MONOCYTES NFR BLD: 5.2 % (ref 4–15)
NEUTROPHILS # BLD AUTO: 10.6 K/UL (ref 1.8–7.7)
NEUTROPHILS NFR BLD: 65.4 % (ref 38–73)
NITRITE UR QL STRIP: NEGATIVE
NRBC BLD-RTO: 0 /100 WBC
PH UR STRIP: 6 [PH] (ref 5–8)
PLATELET # BLD AUTO: 316 K/UL (ref 150–450)
PMV BLD AUTO: 9.3 FL (ref 9.2–12.9)
POC MOLECULAR INFLUENZA A AGN: NEGATIVE
POC MOLECULAR INFLUENZA B AGN: NEGATIVE
POCT GLUCOSE: 109 MG/DL (ref 70–110)
POTASSIUM SERPL-SCNC: 4 MMOL/L (ref 3.5–5.1)
PROT SERPL-MCNC: 7.7 G/DL (ref 6–8.4)
PROT UR QL STRIP: NEGATIVE
RBC # BLD AUTO: 4.23 M/UL (ref 4–5.4)
SODIUM SERPL-SCNC: 138 MMOL/L (ref 136–145)
SP GR UR STRIP: 1 (ref 1–1.03)
TROPONIN I SERPL DL<=0.01 NG/ML-MCNC: <0.006 NG/ML (ref 0–0.03)
URN SPEC COLLECT METH UR: ABNORMAL
UROBILINOGEN UR STRIP-ACNC: NEGATIVE EU/DL
WBC # BLD AUTO: 16.21 K/UL (ref 3.9–12.7)

## 2022-03-31 PROCEDURE — 81003 URINALYSIS AUTO W/O SCOPE: CPT | Performed by: EMERGENCY MEDICINE

## 2022-03-31 PROCEDURE — 81025 URINE PREGNANCY TEST: CPT | Performed by: EMERGENCY MEDICINE

## 2022-03-31 PROCEDURE — 85025 COMPLETE CBC W/AUTO DIFF WBC: CPT | Performed by: STUDENT IN AN ORGANIZED HEALTH CARE EDUCATION/TRAINING PROGRAM

## 2022-03-31 PROCEDURE — 87502 INFLUENZA DNA AMP PROBE: CPT

## 2022-03-31 PROCEDURE — 96374 THER/PROPH/DIAG INJ IV PUSH: CPT

## 2022-03-31 PROCEDURE — 99285 EMERGENCY DEPT VISIT HI MDM: CPT | Mod: 25

## 2022-03-31 PROCEDURE — 25000003 PHARM REV CODE 250: Performed by: STUDENT IN AN ORGANIZED HEALTH CARE EDUCATION/TRAINING PROGRAM

## 2022-03-31 PROCEDURE — 93005 ELECTROCARDIOGRAM TRACING: CPT

## 2022-03-31 PROCEDURE — 80053 COMPREHEN METABOLIC PANEL: CPT | Performed by: STUDENT IN AN ORGANIZED HEALTH CARE EDUCATION/TRAINING PROGRAM

## 2022-03-31 PROCEDURE — 84484 ASSAY OF TROPONIN QUANT: CPT | Performed by: STUDENT IN AN ORGANIZED HEALTH CARE EDUCATION/TRAINING PROGRAM

## 2022-03-31 PROCEDURE — 82962 GLUCOSE BLOOD TEST: CPT

## 2022-03-31 PROCEDURE — 93010 EKG 12-LEAD: ICD-10-PCS | Mod: ,,, | Performed by: INTERNAL MEDICINE

## 2022-03-31 PROCEDURE — 63600175 PHARM REV CODE 636 W HCPCS: Performed by: STUDENT IN AN ORGANIZED HEALTH CARE EDUCATION/TRAINING PROGRAM

## 2022-03-31 PROCEDURE — 83880 ASSAY OF NATRIURETIC PEPTIDE: CPT | Performed by: STUDENT IN AN ORGANIZED HEALTH CARE EDUCATION/TRAINING PROGRAM

## 2022-03-31 PROCEDURE — 93010 ELECTROCARDIOGRAM REPORT: CPT | Mod: ,,, | Performed by: INTERNAL MEDICINE

## 2022-03-31 RX ORDER — HYDROXYZINE PAMOATE 25 MG/1
25 CAPSULE ORAL
Status: COMPLETED | OUTPATIENT
Start: 2022-03-31 | End: 2022-03-31

## 2022-03-31 RX ORDER — HYDROXYZINE PAMOATE 25 MG/1
25 CAPSULE ORAL EVERY 8 HOURS PRN
Qty: 15 CAPSULE | Refills: 0 | Status: SHIPPED | OUTPATIENT
Start: 2022-03-31

## 2022-03-31 RX ORDER — ONDANSETRON 2 MG/ML
4 INJECTION INTRAMUSCULAR; INTRAVENOUS
Status: COMPLETED | OUTPATIENT
Start: 2022-03-31 | End: 2022-03-31

## 2022-03-31 RX ORDER — NAPROXEN 500 MG/1
500 TABLET ORAL
Status: COMPLETED | OUTPATIENT
Start: 2022-03-31 | End: 2022-03-31

## 2022-03-31 RX ORDER — ASPIRIN 325 MG
325 TABLET ORAL
Status: COMPLETED | OUTPATIENT
Start: 2022-03-31 | End: 2022-03-31

## 2022-03-31 RX ADMIN — ONDANSETRON 4 MG: 2 INJECTION INTRAMUSCULAR; INTRAVENOUS at 07:03

## 2022-03-31 RX ADMIN — HYDROXYZINE PAMOATE 25 MG: 25 CAPSULE ORAL at 09:03

## 2022-03-31 RX ADMIN — ASPIRIN 325 MG ORAL TABLET 325 MG: 325 PILL ORAL at 07:03

## 2022-03-31 RX ADMIN — NAPROXEN 500 MG: 500 TABLET ORAL at 08:03

## 2022-04-01 NOTE — ED PROVIDER NOTES
Encounter Date: 3/31/2022    SCRIBE #1 NOTE: I, Gil Davis, am scribing for, and in the presence of,  Deepthi Dietrich DO. I have scribed the following portions of the note - Other sections scribed: HPI, ROS, PE.       History     Chief Complaint   Patient presents with    Chest Pain     Pt states she has been having L sided CP of a constant stabbing 8/10 since this morning with no relief.    Abdominal Pain     Pt has been having RLQ abd pain x 1 wk     Headache     Samantha Sr is a 37 y. o female with a PMHx of anxiety, depression, DM, HTN, migraine headache, stroke, and UTI, that comes to the ED complaining of constant chest pain localized to the left side described as stabbing and an 8/10 beginning this morning. The patient endorses she woke up to the chest pain, endorsing it felt like stabbing and was intermittent at first, but became constant at around 1400, noting the pain does not radiate anywhere. The patient also complains of some nausea, and a headache localized to the forehead. Patient endorses being seen for the same complaints on 03/01 at [facility], where she states she felt better after being discharged, but the pain came back afterwards. The patient also endorses she hasn't had a bowel movement in a week, with her bowel movements usually taking place every couple of days. Compliant with amlodipine, and plavix 75 mg. No other medications taken PTA. No alleviating or exacerbating factors noted. Denies vomiting, fever, chills, SOB, leg swelling, dysuria or other associated symptoms. Patient endorses a family history of strokes, with her grandfather having multiple stroke episodes. Patient endorses she was told her stroke was possibly due to her history of DM and HTN. Allergic to Clindamycin, Compazine, Fioricet, Metoclopramide Hcl, Prochlorperazine, and Reglan.     The history is provided by the patient. No  was used.     Review of patient's allergies indicates:  "  Allergen Reactions    Clindamycin Hives and Itching    Compazine [prochlorperazine edisylate] Anxiety    Fioricet [butalbital-acetaminophen-caff] Hives and Other (See Comments)     Reaction:  "Really bad yeast infections"    Metoclopramide hcl     Prochlorperazine     Reglan [metoclopramide] Hives and Rash     Past Medical History:   Diagnosis Date    Anxiety     Bladder infection     Carpal tunnel syndrome     Depression     Diabetes mellitus     Hypertension     Migraine headache     Pancreatitis     Stroke     UTI (lower urinary tract infection)      Past Surgical History:   Procedure Laterality Date    CHOLECYSTECTOMY      KNEE SURGERY   aug 2008    right knee    KNEE SURGERY      REDUCTION MAMMAPLASTY      Breast reduction     Family History   Problem Relation Age of Onset    Hypertension Mother     Hypertension Father     Hypertension Maternal Grandmother     Breast cancer Neg Hx     Colon cancer Neg Hx     Ovarian cancer Neg Hx      Social History     Tobacco Use    Smoking status: Former Smoker     Years: 8.00     Types: Cigarettes    Smokeless tobacco: Never Used    Tobacco comment: has not smoked in 1 month   Substance Use Topics    Alcohol use: No    Drug use: No     Review of Systems   Constitutional: Negative for chills and fever.   HENT: Negative for sore throat.    Eyes: Negative for visual disturbance.   Respiratory: Negative for cough and shortness of breath.    Cardiovascular: Positive for chest pain. Negative for leg swelling.   Gastrointestinal: Positive for constipation and nausea. Negative for abdominal pain, diarrhea and vomiting.   Genitourinary: Negative for difficulty urinating, dysuria, frequency and hematuria.   Musculoskeletal: Negative for back pain and neck pain.   Skin: Negative for rash.   Neurological: Positive for headaches (forehead). Negative for syncope and weakness.   Psychiatric/Behavioral: Negative for confusion.       Physical Exam "     Initial Vitals [03/31/22 1727]   BP Pulse Resp Temp SpO2   134/83 109 18 98.2 °F (36.8 °C) 97 %      MAP       --         Physical Exam  Vitals and nursing note reviewed.   Constitutional:       Appearance: She is well-developed. She is not ill-appearing, toxic-appearing or diaphoretic.   HENT:      Head: Normocephalic and atraumatic.   Cardiovascular:      Rate and Rhythm: Normal rate and regular rhythm.      Heart sounds: Normal heart sounds.   Pulmonary:      Effort: Pulmonary effort is normal.      Breath sounds: Normal breath sounds.   Abdominal:      Palpations: Abdomen is soft. There is no mass.      Tenderness: There is no abdominal tenderness. There is no guarding or rebound.   Musculoskeletal:         General: Normal range of motion.      Cervical back: Normal range of motion and neck supple.      Right lower leg: No tenderness. No edema.      Left lower leg: No tenderness. No edema.   Skin:     General: Skin is warm and dry.   Neurological:      General: No focal deficit present.      Mental Status: She is alert.      Motor: No weakness.   Psychiatric:         Mood and Affect: Mood normal.         ED Course   Procedures  Labs Reviewed   URINALYSIS, REFLEX TO URINE CULTURE - Abnormal; Notable for the following components:       Result Value    Color, UA Colorless (*)     All other components within normal limits    Narrative:     Specimen Source->Urine   CBC W/ AUTO DIFFERENTIAL - Abnormal; Notable for the following components:    WBC 16.21 (*)     MCH 31.2 (*)     Immature Granulocytes 0.6 (*)     Gran # (ANC) 10.6 (*)     Immature Grans (Abs) 0.10 (*)     All other components within normal limits   COMPREHENSIVE METABOLIC PANEL - Abnormal; Notable for the following components:    Glucose 116 (*)     All other components within normal limits   B-TYPE NATRIURETIC PEPTIDE   TROPONIN I   POCT INFLUENZA A/B MOLECULAR   POCT URINE PREGNANCY   POCT GLUCOSE     EKG Readings: (Independently Interpreted)    Sinus tachycardia with a rate of 108 bpm, normal axis and intervals. No acute ST segment or T wave abnormalities. Grossly unchanged when compared to previous EKG from May 2021.      ECG Results          EKG 12-lead (Chest Pain) Age >30 (Final result)  Result time 04/03/22 15:32:48    Final result by Interface, Lab In [facility] (04/03/22 15:32:48)                 Narrative:    Test Reason : R07.9,    Vent. Rate : 108 BPM     Atrial Rate : 108 BPM     P-R Int : 142 ms          QRS Dur : 088 ms      QT Int : 332 ms       P-R-T Axes : 042 087 045 degrees     QTc Int : 444 ms    Sinus tachycardia  Otherwise normal ECG  When compared with ECG of 02-MAY-2021 21:42,  No significant change was found  Confirmed by Vineet SILVERMAN, Chaya SOARES ([id number]) on 4/3/2022 3:32:39 PM    Referred By: [email or web address]   SELF           Confirmed By:Chaya Manley MD                            Imaging Results          X-Ray Chest PA And Lateral (Final result)  Result time 03/31/22 18:40:24    Final result by Ronnie Ovalle MD (03/31/22 18:40:24)                 Impression:      No acute process.      Electronically signed by: Ronnie Ovalle MD  Date:    03/31/2022  Time:    18:40             Narrative:    EXAMINATION:  XR CHEST PA AND LATERAL    CLINICAL HISTORY:  Chest pain, unspecified    TECHNIQUE:  PA and lateral views of the chest were performed.    COMPARISON:  11/29/2021.    FINDINGS:  Monitoring EKG leads are present.  The trachea is unremarkable.  The cardiomediastinal silhouette is within normal limits.  The hemidiaphragms are unremarkable.  There are no pleural effusions.  There is no evidence of a pneumothorax.  There is no evidence of pneumomediastinum.  No airspace opacity is present.  The osseous structures are unremarkable.                                 Medications   ondansetron injection 4 mg (4 mg Intravenous Given 3/31/22 1906)   aspirin tablet 325 mg (325 mg Oral Given 3/31/22 1938)   naproxen tablet 500 mg (500 mg Oral Given 3/31/22 2036)    hydrOXYzine pamoate capsule 25 mg (25 mg Oral Given 3/31/22 2130)     Medical Decision Making:   History:   Old Medical Records: I decided to obtain old medical records.  Clinical Tests:   Lab Tests: Ordered and Reviewed  Radiological Study: Ordered and Reviewed  Medical Tests: Ordered and Reviewed  ED Management:   MDM  This is an emergent evaluation of a  37 y. o female with a PMHx of anxiety, depression, DM, HTN, migraine headache, stroke, and UTI, that comes to the ED complaining of constant chest pain localized to the left side described as stabbing and an 8/10 beginning this morning. Initial vitals in the ED unremarkable. Physical exam noted above. DDx includes but is not limited to anxiety, musculoskeletal pain, ACS, pleural effusion, pneumonia, PE, COPD, CHF. Labs and imaging including a cardiac work-up was obtained. Work-up with mild chronic appearing leukocytosis but otherwise benign. Low for PE. She was initially treated with Zofran, Asa and naprosyn. On reassessment, her symptoms were only mildly improved. She then stated she felt similar symptoms in the past with Anxiety. She was treated with PO Vistaril. Given benign exam and work-up in the setting of a low heart score, she was discharged with Vistaril, PCP follow-up and ED return precautions. Patient is aware of plan and is amenable.     Deepthi Dietrich D.O  EMERGENCY MEDICINE  9:09 PM 03/31/2022    Additional MDM:   Heart Score:    History:          Slightly suspicious.  ECG:             Normal  Age:               Less than 45 years  Risk factors: 1-2 risk factors  Troponin:       Less than or equal to normal limit  Final Score: 1           Scribe Attestation:   Scribe #1: I performed the above scribed service and the documentation accurately describes the services I performed. I attest to the accuracy of the note.                 Clinical Impression:   Final diagnoses:  [R07.9] Chest pain  [F41.9] Anxiety (Primary)          ED Disposition  Condition    Discharge Stable       I, Deepthi Dietrich DO, personally performed the services described in this documentation. All medical record entries made by the scribe were at my direction and in my presence. I have reviewed the chart and agree that the record reflects my personal performance and is accurate and complete.    ED Prescriptions     Medication Sig Dispense Start Date End Date Auth. Provider    hydrOXYzine pamoate (VISTARIL) 25 MG Cap Take 1 capsule (25 mg total) by mouth every 8 (eight) hours as needed (anxiety). 15 capsule 3/31/2022  Deepthi Dietrich DO        Follow-up Information     Follow up With Specialties Details Why Contact Info    Jensen Spence, GIOVAN[facility] Family Medicine Schedule an appointment as soon as possible for a visit on 4/4/2022  [address] [address]  [phone]      [facility] CARDIOLOGY Cardiology Schedule an appointment as soon as possible for a visit on 4/4/2022 Emergency Room Follow-up [address] [facility] [address]  [phone]    [facility] Emergency Dept Emergency Medicine Go to  If symptoms worsen [address] [facility] [id number] [phone]           Deepthi Dietrich,   04/04/22 1636       Deepthi Dietrich,   05/26/22 0879

## 2022-04-02 ENCOUNTER — NURSE TRIAGE (OUTPATIENT)
Dept: ADMINISTRATIVE | Facility: CLINIC | Age: 37
End: 2022-04-02
Payer: MEDICAID

## 2022-04-02 NOTE — TELEPHONE ENCOUNTER
"Pt with c/o of blood pressure elevated, feeling "hot" in her face and chest.  Pt has a history of a stroke.  BP during triage call 157/110 .  Care Advice states to go to ED now.  Pt's father will drive her to the ED.  All instructions understood.    Reason for Disposition   [1] Systolic BP  >= 160 OR Diastolic >= 100 AND [2] cardiac or neurologic symptoms (e.g., chest pain, difficulty breathing, unsteady gait, blurred vision)    Additional Information   Negative: Difficult to awaken or acting confused (e.g., disoriented, slurred speech)   Negative: SEVERE difficulty breathing (e.g., struggling for each breath, speaks in single words)   Negative: [1] Weakness of the face, arm or leg on one side of the body AND [2] new-onset   Negative: [1] Numbness (i.e., loss of sensation) of the face, arm or leg on one side of the body AND [2] new-onset   Negative: [1] Chest pain lasts > 5 minutes AND [2] history of heart disease  (i.e., heart attack, bypass surgery, angina, angioplasty, CHF)   Negative: [1] Chest pain AND [2] took nitrogylcerin AND [3] pain was not relieved   Negative: Sounds like a life-threatening emergency to the triager   Negative: Symptom is main concern  (e.g., headache, chest pain)   Negative: Low blood pressure is main concern    Protocols used: BLOOD PRESSURE - HIGH-A-AH      "

## 2022-06-11 ENCOUNTER — HOSPITAL ENCOUNTER (EMERGENCY)
Facility: HOSPITAL | Age: 37
Discharge: HOME | End: 2022-06-11
Attending: EMERGENCY MEDICINE
Payer: MEDICAID

## 2022-06-11 VITALS
WEIGHT: 195 LBS | BODY MASS INDEX: 36.82 KG/M2 | TEMPERATURE: 98 F | HEART RATE: 94 BPM | SYSTOLIC BLOOD PRESSURE: 129 MMHG | OXYGEN SATURATION: 97 % | DIASTOLIC BLOOD PRESSURE: 93 MMHG | HEIGHT: 61 IN | RESPIRATION RATE: 18 BRPM

## 2022-06-11 DIAGNOSIS — S39.012A LUMBAR STRAIN, INITIAL ENCOUNTER: Primary | ICD-10-CM

## 2022-06-11 PROBLEM — R53.1 RIGHT SIDED WEAKNESS: Status: ACTIVE | Noted: 2020-09-26

## 2022-06-11 PROBLEM — I63.9 ACUTE CVA (CEREBROVASCULAR ACCIDENT): Status: ACTIVE | Noted: 2020-09-26

## 2022-06-11 PROBLEM — I16.1 HYPERTENSIVE EMERGENCY: Status: ACTIVE | Noted: 2020-09-26

## 2022-06-11 LAB
B-HCG UR QL: NEGATIVE
BILIRUBIN, POC UA: NEGATIVE
BLOOD, POC UA: ABNORMAL
CLARITY, POC UA: CLEAR
COLOR, POC UA: YELLOW
CTP QC/QA: YES
GLUCOSE, POC UA: NEGATIVE
KETONES, POC UA: NEGATIVE
LEUKOCYTE EST, POC UA: NEGATIVE
NITRITE, POC UA: NEGATIVE
PH UR STRIP: 5.5 [PH]
POCT GLUCOSE: 202 MG/DL (ref 70–110)
PROTEIN, POC UA: NEGATIVE
SPECIFIC GRAVITY, POC UA: <=1.005
UROBILINOGEN, POC UA: 0.2 E.U./DL

## 2022-06-11 PROCEDURE — 81003 URINALYSIS AUTO W/O SCOPE: CPT | Mod: ER

## 2022-06-11 PROCEDURE — 96372 THER/PROPH/DIAG INJ SC/IM: CPT | Performed by: EMERGENCY MEDICINE

## 2022-06-11 PROCEDURE — 81025 URINE PREGNANCY TEST: CPT | Mod: ER | Performed by: EMERGENCY MEDICINE

## 2022-06-11 PROCEDURE — 82962 GLUCOSE BLOOD TEST: CPT | Mod: ER

## 2022-06-11 PROCEDURE — 25000003 PHARM REV CODE 250: Mod: ER | Performed by: EMERGENCY MEDICINE

## 2022-06-11 PROCEDURE — 63600175 PHARM REV CODE 636 W HCPCS: Mod: ER | Performed by: EMERGENCY MEDICINE

## 2022-06-11 PROCEDURE — 99284 EMERGENCY DEPT VISIT MOD MDM: CPT | Mod: 25,ER

## 2022-06-11 RX ORDER — METHOCARBAMOL 750 MG/1
1500 TABLET, FILM COATED ORAL 3 TIMES DAILY
Qty: 30 TABLET | Refills: 0 | Status: SHIPPED | OUTPATIENT
Start: 2022-06-11 | End: 2022-06-16

## 2022-06-11 RX ORDER — HYDROCODONE BITARTRATE AND ACETAMINOPHEN 5; 325 MG/1; MG/1
1 TABLET ORAL EVERY 4 HOURS PRN
Qty: 6 TABLET | Refills: 0 | Status: SHIPPED | OUTPATIENT
Start: 2022-06-11 | End: 2022-06-11 | Stop reason: SDUPTHER

## 2022-06-11 RX ORDER — DEXAMETHASONE SODIUM PHOSPHATE 4 MG/ML
8 INJECTION, SOLUTION INTRA-ARTICULAR; INTRALESIONAL; INTRAMUSCULAR; INTRAVENOUS; SOFT TISSUE
Status: COMPLETED | OUTPATIENT
Start: 2022-06-11 | End: 2022-06-11

## 2022-06-11 RX ORDER — METHOCARBAMOL 750 MG/1
1500 TABLET, FILM COATED ORAL 3 TIMES DAILY
Qty: 30 TABLET | Refills: 0 | Status: SHIPPED | OUTPATIENT
Start: 2022-06-11 | End: 2022-06-11 | Stop reason: SDUPTHER

## 2022-06-11 RX ORDER — HYDROCODONE BITARTRATE AND ACETAMINOPHEN 5; 325 MG/1; MG/1
1 TABLET ORAL EVERY 4 HOURS PRN
Qty: 6 TABLET | Refills: 0 | Status: SHIPPED | OUTPATIENT
Start: 2022-06-11

## 2022-06-11 RX ORDER — OXYCODONE AND ACETAMINOPHEN 5; 325 MG/1; MG/1
1 TABLET ORAL
Status: COMPLETED | OUTPATIENT
Start: 2022-06-11 | End: 2022-06-11

## 2022-06-11 RX ORDER — ORPHENADRINE CITRATE 30 MG/ML
60 INJECTION INTRAMUSCULAR; INTRAVENOUS
Status: COMPLETED | OUTPATIENT
Start: 2022-06-11 | End: 2022-06-11

## 2022-06-11 RX ADMIN — DEXAMETHASONE SODIUM PHOSPHATE 8 MG: 4 INJECTION INTRA-ARTICULAR; INTRALESIONAL; INTRAMUSCULAR; INTRAVENOUS; SOFT TISSUE at 12:06

## 2022-06-11 RX ADMIN — ORPHENADRINE CITRATE 60 MG: 30 INJECTION INTRAMUSCULAR; INTRAVENOUS at 12:06

## 2022-06-11 RX ADMIN — OXYCODONE AND ACETAMINOPHEN 1 TABLET: 5; 325 TABLET ORAL at 02:06

## 2022-06-11 NOTE — DISCHARGE INSTRUCTIONS
Take medications as directed.  You can apply a heating pad for 20 minutes then do gentle stretching.  Apply ice after stretching.  See your doctor if you are not better with this treatment.

## 2022-06-11 NOTE — FIRST PROVIDER EVALUATION
"Medical screening exam completed.  I have conducted a focused provider triage encounter, findings are as follows:    Brief history of present illness:  37-year-old female presenting to the ED with low back pain since this morning.  Reports pain began after washing dishes.  She felt her back gave out and she fell to the ground.  Denies numbness, tingling, bowel or bladder incontinence, saddle anesthesia.  She took ibuprofen and gabapentin with no improvement.    Vitals:    06/11/22 1018   BP: (!) 141/97   BP Location: Right arm   Patient Position: Sitting   Pulse: (!) 111   Resp: 18   Temp: 98.3 °F (36.8 °C)   TempSrc: Oral   SpO2: 96%   Weight: 88.5 kg (195 lb)   Height: 5' 1" (1.549 m)       Pertinent physical exam:  Well-appearing.  Tenderness with palpation of the paraspinal lumbar musculature.  No midline tenderness.  Abdomen is soft.    Brief workup plan:  UPT, UA.    Preliminary workup initiated; this workup will be continued and followed by the physician or advanced practice provider that is assigned to the patient when roomed.  "

## 2022-06-11 NOTE — ED PROVIDER NOTES
"Encounter Date: 6/11/2022    SCRIBE #1 NOTE: I, Pina Celestin, am scribing for, and in the presence of,  Morena Jones MD. I have scribed the following portions of the note - Other sections scribed: HPI; ROS; PE.       History     Chief Complaint   Patient presents with    Back Pain     Pt with lower back pain since 0700. Pt denies any injury or trauma to back.      Samantha Sr is a 37 y.o. female with Hx of HTN, HLD, DM, and TIA who presents to the ED for chief complaint of lower back pain onset today. Patient reports she stood up from a sitting position when her "back gave out" and she fell onto the floor. She states pain is localized near the tailbone. No modifying factors.  Patient denies associated numbness or tingling to the lower extremities, bladder or bowel incontinence.  She is a former smoker.      The history is provided by the patient. No  was used.     Review of patient's allergies indicates:   Allergen Reactions    Clindamycin Hives and Itching    Compazine [prochlorperazine edisylate] Anxiety    Fioricet [butalbital-acetaminophen-caff] Hives and Other (See Comments)     Reaction:  "Really bad yeast infections"    Metoclopramide hcl     Prochlorperazine     Reglan [metoclopramide] Hives and Rash     Past Medical History:   Diagnosis Date    Anxiety     Bladder infection     Carpal tunnel syndrome     Depression     Diabetes mellitus     Hypertension     Migraine headache     Pancreatitis     Stroke     UTI (lower urinary tract infection)      Past Surgical History:   Procedure Laterality Date    CHOLECYSTECTOMY      KNEE SURGERY   aug 2008    right knee    KNEE SURGERY      REDUCTION MAMMAPLASTY      Breast reduction     Family History   Problem Relation Age of Onset    Hypertension Mother     Hypertension Father     Hypertension Maternal Grandmother     Breast cancer Neg Hx     Colon cancer Neg Hx     Ovarian cancer Neg Hx      Social History "     Tobacco Use    Smoking status: Former Smoker     Years: 8.00     Types: Cigarettes    Smokeless tobacco: Never Used    Tobacco comment: has not smoked in 1 month   Substance Use Topics    Alcohol use: No    Drug use: No     Review of Systems   Constitutional: Negative for activity change, appetite change, chills and fever.   HENT: Negative for congestion, rhinorrhea, sneezing and sore throat.    Respiratory: Negative for cough, choking, shortness of breath and wheezing.    Cardiovascular: Negative for chest pain and palpitations.   Gastrointestinal: Negative for abdominal pain, diarrhea, nausea and vomiting.   Genitourinary: Negative for difficulty urinating.   Musculoskeletal: Positive for back pain.   Neurological: Negative for dizziness, syncope, weakness, light-headedness, numbness and headaches.   All other systems reviewed and are negative.      Physical Exam     Initial Vitals [06/11/22 1018]   BP Pulse Resp Temp SpO2   (!) 141/97 (!) 111 18 98.3 °F (36.8 °C) 96 %      MAP       --         Physical Exam    Nursing note and vitals reviewed.  Constitutional: Vital signs are normal. She appears well-developed and well-nourished. She is cooperative. She does not appear ill. No distress.   HENT:   Head: Normocephalic and atraumatic.   Mouth/Throat: Uvula is midline and mucous membranes are normal.   Eyes: Conjunctivae and lids are normal.   Neck: Neck supple.   Normal range of motion.  Cardiovascular: Normal rate, regular rhythm, S1 normal, S2 normal and normal heart sounds.   No murmur heard.  Pulmonary/Chest: Effort normal and breath sounds normal. No respiratory distress. She has no wheezes.   Abdominal: Abdomen is soft. Bowel sounds are normal. She exhibits no distension. There is no abdominal tenderness.   Musculoskeletal:         General: Normal range of motion.      Cervical back: Normal range of motion and neck supple.      Lumbar back: Tenderness present.        Back:      Neurological: She is  alert and oriented to person, place, and time. She has normal strength.   Skin: Skin is warm, dry and intact.   Psychiatric: She has a normal mood and affect. Her speech is normal and behavior is normal.         ED Course   Procedures  Labs Reviewed   POCT URINALYSIS W/O SCOPE - Abnormal; Notable for the following components:       Result Value    Spec Grav UA <=1.005 (*)     Blood, UA 2+ (*)     All other components within normal limits   POCT GLUCOSE - Abnormal; Notable for the following components:    POCT Glucose 202 (*)     All other components within normal limits   POCT URINE PREGNANCY   POCT URINALYSIS W/O SCOPE          Imaging Results          X-Ray Lumbar Spine Ap And Lateral (Final result)  Result time 06/11/22 12:47:20    Final result by Khanh Dolan MD (06/11/22 12:47:20)                 Impression:      1. No acute displaced fracture or dislocation of the lumbar spine.      Electronically signed by: Khanh Dolan MD  Date:    06/11/2022  Time:    12:47             Narrative:    EXAMINATION:  XR LUMBAR SPINE AP AND LATERAL    CLINICAL HISTORY:  low back pain;    TECHNIQUE:  AP, lateral and spot images were performed of the lumbar spine.    COMPARISON:  None    FINDINGS:  Three views lumbar spine.    Lateral imaging demonstrates adequate alignment of the lumbar spine without significant vertebral body height loss or disc space height loss.  The facet joints are aligned noting lower lumbar facet arthropathy.  AP spinal alignment is unremarkable.  The bilateral sacroiliac joints are intact.  Surgical changes project over the right upper quadrant.                                 Medications   dexamethasone injection 8 mg (8 mg Intramuscular Given 6/11/22 1238)   orphenadrine injection 60 mg (60 mg Intramuscular Given 6/11/22 1237)   oxyCODONE-acetaminophen 5-325 mg per tablet 1 tablet (1 tablet Oral Given 6/11/22 1400)     Medical Decision Making:   Clinical Tests:   Lab Tests: Ordered and  Reviewed  Radiological Study: Ordered and Reviewed  ED Management:  Acute traumatic lower back pain, neuro intact.  Xray with no fracture.  Treat pain with muscle relaxer and norco.  IM steroids given in ER.          Scribe Attestation:   Scribe #1: I performed the above scribed service and the documentation accurately describes the services I performed. I attest to the accuracy of the note.               I, Dr. Morena Jones, personally performed the services described in this documentation.   All medical record entries made by the scribe were at my direction and in my presence.   I have reviewed the chart and agree that the record is accurate and complete.   Morena Jones MD.  12:20 PM 06/13/2022     Clinical Impression:   Final diagnoses:  [S39.012A] Lumbar strain, initial encounter (Primary)          ED Disposition Condition    Discharge Stable        ED Prescriptions     Medication Sig Dispense Start Date End Date Auth. Provider    HYDROcodone-acetaminophen (NORCO) 5-325 mg per tablet  (Status: Discontinued) Take 1 tablet by mouth every 4 (four) hours as needed for Pain. 6 tablet 6/11/2022 6/11/2022 Morena Jones MD    methocarbamoL (ROBAXIN) 750 MG Tab  (Status: Discontinued) Take 2 tablets (1,500 mg total) by mouth 3 (three) times daily. for 5 days 30 tablet 6/11/2022 6/11/2022 Morena Jones MD    HYDROcodone-acetaminophen (NORCO) 5-325 mg per tablet Take 1 tablet by mouth every 4 (four) hours as needed for Pain. 6 tablet 6/11/2022  Morena Jones MD    methocarbamoL (ROBAXIN) 750 MG Tab Take 2 tablets (1,500 mg total) by mouth 3 (three) times daily. for 5 days 30 tablet 6/11/2022 6/16/2022 Morena Jones MD        Follow-up Information     Follow up With Specialties Details Why Contact Info    Jensen Spence, FNP-C Family Medicine  if not better with this treatment [address] [id number] [phone]             Morena Jones MD  06/13/22 1140

## 2022-08-23 ENCOUNTER — HOSPITAL ENCOUNTER (EMERGENCY)
Facility: HOSPITAL | Age: 37
Discharge: HOME | End: 2022-08-23
Attending: EMERGENCY MEDICINE
Payer: MEDICAID

## 2022-08-23 VITALS
HEIGHT: 61 IN | DIASTOLIC BLOOD PRESSURE: 109 MMHG | BODY MASS INDEX: 37.19 KG/M2 | SYSTOLIC BLOOD PRESSURE: 146 MMHG | OXYGEN SATURATION: 97 % | TEMPERATURE: 99 F | HEART RATE: 100 BPM | RESPIRATION RATE: 18 BRPM | WEIGHT: 197 LBS

## 2022-08-23 DIAGNOSIS — S93.491A SPRAIN OF ANTERIOR TALOFIBULAR LIGAMENT OF RIGHT ANKLE, INITIAL ENCOUNTER: Primary | ICD-10-CM

## 2022-08-23 DIAGNOSIS — T14.90XA INJURY: ICD-10-CM

## 2022-08-23 LAB
B-HCG UR QL: NEGATIVE
CTP QC/QA: YES
POCT GLUCOSE: 170 MG/DL (ref 70–110)

## 2022-08-23 PROCEDURE — 81025 URINE PREGNANCY TEST: CPT | Mod: ER | Performed by: NURSE PRACTITIONER

## 2022-08-23 PROCEDURE — 99283 EMERGENCY DEPT VISIT LOW MDM: CPT | Mod: 25,ER

## 2022-08-23 PROCEDURE — 82962 GLUCOSE BLOOD TEST: CPT | Mod: ER

## 2022-08-23 RX ORDER — MELOXICAM 7.5 MG/1
7.5 TABLET ORAL DAILY
Qty: 12 TABLET | Refills: 0 | Status: SHIPPED | OUTPATIENT
Start: 2022-08-23 | End: 2023-01-25 | Stop reason: ALTCHOICE

## 2022-08-23 NOTE — ED PROVIDER NOTES
"Encounter Date: 8/23/2022    SCRIBE #1 NOTE: IPina am scribing for, and in the presence of,  Omar Barreto PA-C. I have scribed the following portions of the note - Other sections scribed: HPI; ROS.       History     Chief Complaint   Patient presents with    Ankle Pain     Pt c/o R ankle pain, pt reports she rolled this ankle two weeks ago and it has been bothering her since. Pt ambulatory to triage. CMS intact to RLE.      Samantha Sr is a 37 y.o. female with Hx of DM, HTN, and CVA who presents to the ED for chief complaint of right lateral ankle pain and swelling s/p injury 2 weeks ago. Patient reports she had a cerebral vascular accident earlier in the year and now has right sided deficits. She states she has frequently rolled her right ankle due to her deficits. Patient reports pain is worse with flexion. She attempted treatment with Ibuprofen and Tylenol with no relief. No further complaints at this time.      The history is provided by the patient. No  was used.     Review of patient's allergies indicates:   Allergen Reactions    Clindamycin Hives and Itching    Compazine [prochlorperazine edisylate] Anxiety    Fioricet [butalbital-acetaminophen-caff] Hives and Other (See Comments)     Reaction:  "Really bad yeast infections"    Metoclopramide hcl     Prochlorperazine     Reglan [metoclopramide] Hives and Rash     Past Medical History:   Diagnosis Date    Anxiety     Bladder infection     Carpal tunnel syndrome     Depression     Diabetes mellitus     Hypertension     Migraine headache     Pancreatitis     Stroke     UTI (lower urinary tract infection)      Past Surgical History:   Procedure Laterality Date    CHOLECYSTECTOMY      KNEE SURGERY   aug 2008    right knee    KNEE SURGERY      REDUCTION MAMMAPLASTY      Breast reduction     Family History   Problem Relation Age of Onset    Hypertension Mother     Hypertension Father     Hypertension " Maternal Grandmother     Breast cancer Neg Hx     Colon cancer Neg Hx     Ovarian cancer Neg Hx      Social History     Tobacco Use    Smoking status: Former Smoker     Years: 8.00     Types: Cigarettes    Smokeless tobacco: Never Used    Tobacco comment: has not smoked in 1 month   Substance Use Topics    Alcohol use: No    Drug use: No     Review of Systems   Constitutional: Negative for fever.   HENT: Negative for congestion, sore throat and trouble swallowing.    Respiratory: Negative for cough and shortness of breath.    Cardiovascular: Negative for chest pain.   Gastrointestinal: Negative for abdominal pain, constipation, diarrhea, nausea and vomiting.   Genitourinary: Negative for dysuria, flank pain, frequency and urgency.   Musculoskeletal: Positive for arthralgias (right ankle). Negative for back pain.   Skin: Negative for rash.   Neurological: Negative for headaches.   All other systems reviewed and are negative.      Physical Exam     Initial Vitals [08/23/22 1616]   BP Pulse Resp Temp SpO2   (!) 147/110 (!) 111 18 98.5 °F (36.9 °C) 98 %      MAP       --         Physical Exam    Nursing note and vitals reviewed.  Constitutional: She appears well-developed and well-nourished. She is not diaphoretic. No distress.   HENT:   Head: Normocephalic and atraumatic.   Nose: Nose normal.   Eyes: Conjunctivae and EOM are normal. Right eye exhibits no discharge. Left eye exhibits no discharge.   Neck: No tracheal deviation present. No JVD present.   Normal range of motion.  Cardiovascular: Normal rate, regular rhythm and normal heart sounds. Exam reveals no friction rub.    No murmur heard.  Pulmonary/Chest: Breath sounds normal. No stridor. No respiratory distress. She has no wheezes. She has no rhonchi. She has no rales. She exhibits no tenderness.   Musculoskeletal:      Cervical back: Normal range of motion.      Comments: Mild TTP with associated swelling to the R lateral ankle. No erythema or gross  deformity.  No tenderness of the foot, knee, or hip.  Distal lower extremity pulses 2+ and equal.  Sensation intact and equal. No foot drop.  Full ROM of lower extremity.  Ambulatory.      Neurological: She is alert and oriented to person, place, and time.   Skin: Skin is warm and dry. No rash and no abscess noted. No erythema. No pallor.   Psychiatric: She has a normal mood and affect. Thought content normal.         ED Course   Procedures  Labs Reviewed   POCT GLUCOSE - Abnormal; Notable for the following components:       Result Value    POCT Glucose 170 (*)     All other components within normal limits   POCT URINE PREGNANCY   POCT GLUCOSE, HAND-HELD DEVICE          Imaging Results          X-Ray Foot Complete Right (Final result)  Result time 08/23/22 17:44:08    Final result by Jayla Samson MD (08/23/22 17:44:08)                 Impression:      No acute osseous abnormality identified.      Electronically signed by: Jayla Samson MD  Date:    08/23/2022  Time:    17:44             Narrative:    EXAMINATION:  XR ANKLE COMPLETE 3 VIEW RIGHT; XR FOOT COMPLETE 3 VIEW RIGHT    CLINICAL HISTORY:  Injury, unspecified, initial encounter    TECHNIQUE:  AP, lateral, and oblique images of the right ankle were performed.  Right foot three views.    COMPARISON:  None    FINDINGS:  No evidence of acute displaced fracture, dislocation, or osseous destructive process.  Ankle mortise is maintained.  Lisfranc articulation appears congruent.  Mild soft tissue swelling is seen at the lateral aspect of the ankle.                               X-Ray Ankle Complete Right (Final result)  Result time 08/23/22 17:44:08    Final result by Jayla Samson MD (08/23/22 17:44:08)                 Impression:      No acute osseous abnormality identified.      Electronically signed by: Jayla Samson MD  Date:    08/23/2022  Time:    17:44             Narrative:    EXAMINATION:  XR ANKLE COMPLETE 3 VIEW RIGHT; XR FOOT COMPLETE 3 VIEW  RIGHT    CLINICAL HISTORY:  Injury, unspecified, initial encounter    TECHNIQUE:  AP, lateral, and oblique images of the right ankle were performed.  Right foot three views.    COMPARISON:  None    FINDINGS:  No evidence of acute displaced fracture, dislocation, or osseous destructive process.  Ankle mortise is maintained.  Lisfranc articulation appears congruent.  Mild soft tissue swelling is seen at the lateral aspect of the ankle.                                 Medications - No data to display  Medical Decision Making:   History:   Old Medical Records: I decided to obtain old medical records.  Independently Interpreted Test(s):   I have ordered and independently interpreted X-rays - see prior notes.  Clinical Tests:   Lab Tests: Ordered and Reviewed  The following lab test(s) were unremarkable: UPT  Radiological Study: Ordered and Reviewed  ED Management:    This is an emergent evaluation of a 37 y.o. female presenting to the ED for ankle pain. Denies other injury, hip pain, foot pain, knee pain, fever, inability to bear weight on ankle, and numbness/tingling. Afebrile. Patient is non-toxic appearing and in no acute distress. Xray shows no acute fracture or dislocation. No neurovascular compromise. Ambulatory. Presentation most consistent with sprain. Given the above, I have considered but doubt septic joint, achilles tendon rupture, DVT, avascular necrosis, and gout.    Pain controlled in ED. Discharged home with supportive care. I discussed RICE treatment with the patient and issued the patient an educational handout on self care for ankle injuries. Instructed to follow up with PCP and orthopedics for reevaluation and management of symptoms. Ambulates out of ED.    I discussed with the patient the diagnosis, treatment plan, indications for return to the emergency department, and for expected follow-up. The patient verbalized an understanding. The patient is asked if there are any questions or concerns. We  discuss the case, until all issues are addressed to the patients satisfaction. Patient understands and is agreeable to the plan.           Scribe Attestation:   Scribe #1: I performed the above scribed service and the documentation accurately describes the services I performed. I attest to the accuracy of the note.               Scribe attestation: I, Omar Barreto PA-C, personally performed the services described in this documentation.  All medical record entries made by the scribe were at my direction and in my presence.  I have reviewed the chart and agree that the record reflects my personal performance and is accurate and complete.    Clinical Impression:   Final diagnoses:  [T14.90XA] Injury  [S93.491A] Sprain of anterior talofibular ligament of right ankle, initial encounter (Primary)          ED Disposition Condition    Discharge Stable        ED Prescriptions     Medication Sig Dispense Start Date End Date Auth. Provider    meloxicam (MOBIC) 7.5 MG tablet Take 1 tablet (7.5 mg total) by mouth once daily. 12 tablet 8/23/2022  Omar Barreto PA-C        Follow-up Information     Follow up With Specialties Details Why Contact Info    [facility] Schedule an appointment as soon as possible for a visit in 1 day For re-evaluation [address] [facility] [address]  [facility] [id number] [phone]      [facility] ED Emergency Medicine Go to  If symptoms worsen [address] [facility] [address] [phone]           Omar Barreto PA-C  08/23/22 4452

## 2022-08-23 NOTE — Clinical Note
"Samantha Cabreraanda" Remberto was seen and treated in our emergency department on 8/23/2022.  She may return to work on 08/25/2022.       If you have any questions or concerns, please don't hesitate to call.      ALEX ESTRADA"

## 2022-08-23 NOTE — DISCHARGE INSTRUCTIONS

## 2022-11-19 NOTE — ED TRIAGE NOTES
"Pt here for facial swelling. Reports being seen in this ED a few days ago; was given abx and "they told me it was maybe a bug bite". Initial swelling was to right side of face. Pt reports swelling has not improved to right side, now left swollen as well, right side now painful. Pt reports vomiting today, "at least 15 times. Its been constant all day".   "
no

## 2022-12-04 ENCOUNTER — HOSPITAL ENCOUNTER (EMERGENCY)
Facility: HOSPITAL | Age: 37
Discharge: HOME | End: 2022-12-05
Attending: INTERNAL MEDICINE
Payer: MEDICAID

## 2022-12-04 DIAGNOSIS — R07.9 CHEST PAIN: ICD-10-CM

## 2022-12-04 PROCEDURE — 93010 ELECTROCARDIOGRAM REPORT: CPT | Mod: ,,, | Performed by: INTERNAL MEDICINE

## 2022-12-04 PROCEDURE — 93005 ELECTROCARDIOGRAM TRACING: CPT | Mod: ER

## 2022-12-04 PROCEDURE — 93010 EKG 12-LEAD: ICD-10-PCS | Mod: ,,, | Performed by: INTERNAL MEDICINE

## 2022-12-05 VITALS
RESPIRATION RATE: 19 BRPM | OXYGEN SATURATION: 100 % | HEIGHT: 61 IN | SYSTOLIC BLOOD PRESSURE: 154 MMHG | BODY MASS INDEX: 36.82 KG/M2 | TEMPERATURE: 98 F | WEIGHT: 195 LBS | DIASTOLIC BLOOD PRESSURE: 89 MMHG | HEART RATE: 99 BPM

## 2022-12-05 PROBLEM — R07.9 CHEST PAIN: Status: ACTIVE | Noted: 2022-12-05

## 2022-12-05 LAB
ALBUMIN SERPL-MCNC: 3.9 G/DL (ref 3.3–5.5)
ALBUMIN SERPL-MCNC: 3.9 G/DL (ref 3.3–5.5)
ALP SERPL-CCNC: 71 U/L (ref 42–141)
ALP SERPL-CCNC: 82 U/L (ref 42–141)
BILIRUB SERPL-MCNC: 0.4 MG/DL (ref 0.2–1.6)
BILIRUB SERPL-MCNC: 0.5 MG/DL (ref 0.2–1.6)
BUN SERPL-MCNC: 11 MG/DL (ref 7–22)
CALCIUM SERPL-MCNC: 9.7 MG/DL (ref 8–10.3)
CHLORIDE SERPL-SCNC: 108 MMOL/L (ref 98–108)
CREAT SERPL-MCNC: 0.7 MG/DL (ref 0.6–1.2)
GLUCOSE SERPL-MCNC: 168 MG/DL (ref 73–118)
HCT, POC: NORMAL
HGB, POC: NORMAL (ref 14–18)
MCH, POC: NORMAL
MCHC, POC: NORMAL
MCV, POC: NORMAL
MPV, POC: NORMAL
POC ALT (SGPT): 52 U/L (ref 10–47)
POC ALT (SGPT): 53 U/L (ref 10–47)
POC AMYLASE: 23 U/L (ref 14–97)
POC AST (SGOT): 57 U/L (ref 11–38)
POC AST (SGOT): 61 U/L (ref 11–38)
POC CARDIAC TROPONIN I: 0 NG/ML (ref 0–0.08)
POC GGT: 73 U/L (ref 5–65)
POC PLATELET COUNT: NORMAL
POC TCO2: 25 MMOL/L (ref 18–33)
POTASSIUM BLD-SCNC: 4.7 MMOL/L (ref 3.6–5.1)
PROTEIN, POC: 6.8 G/DL (ref 6.4–8.1)
PROTEIN, POC: 6.8 G/DL (ref 6.4–8.1)
RBC, POC: NORMAL
RDW, POC: NORMAL
SAMPLE: NORMAL
SODIUM BLD-SCNC: 142 MMOL/L (ref 128–145)
WBC, POC: NORMAL

## 2022-12-05 PROCEDURE — 96374 THER/PROPH/DIAG INJ IV PUSH: CPT | Mod: ER

## 2022-12-05 PROCEDURE — 96375 TX/PRO/DX INJ NEW DRUG ADDON: CPT | Mod: ER

## 2022-12-05 PROCEDURE — 82962 GLUCOSE BLOOD TEST: CPT | Mod: ER

## 2022-12-05 PROCEDURE — 25000003 PHARM REV CODE 250: Mod: ER | Performed by: INTERNAL MEDICINE

## 2022-12-05 PROCEDURE — 80053 COMPREHEN METABOLIC PANEL: CPT | Mod: ER

## 2022-12-05 PROCEDURE — 85025 COMPLETE CBC W/AUTO DIFF WBC: CPT | Mod: ER

## 2022-12-05 PROCEDURE — 82150 ASSAY OF AMYLASE: CPT | Mod: ER

## 2022-12-05 PROCEDURE — 84484 ASSAY OF TROPONIN QUANT: CPT | Mod: ER

## 2022-12-05 PROCEDURE — 99284 EMERGENCY DEPT VISIT MOD MDM: CPT | Mod: 25,ER

## 2022-12-05 PROCEDURE — 63600175 PHARM REV CODE 636 W HCPCS: Mod: ER | Performed by: INTERNAL MEDICINE

## 2022-12-05 RX ORDER — KETOROLAC TROMETHAMINE 30 MG/ML
30 INJECTION, SOLUTION INTRAMUSCULAR; INTRAVENOUS
Status: COMPLETED | OUTPATIENT
Start: 2022-12-05 | End: 2022-12-05

## 2022-12-05 RX ORDER — CLOPIDOGREL BISULFATE 75 MG/1
75 TABLET ORAL
COMMUNITY
Start: 2022-11-05

## 2022-12-05 RX ORDER — LANCETS 33 GAUGE
EACH MISCELLANEOUS 4 TIMES DAILY
COMMUNITY
Start: 2022-11-11

## 2022-12-05 RX ORDER — DOXYCYCLINE 100 MG/1
100 CAPSULE ORAL 2 TIMES DAILY
COMMUNITY
Start: 2022-10-01 | End: 2022-10-11

## 2022-12-05 RX ORDER — LIDOCAINE HYDROCHLORIDE 20 MG/ML
15 SOLUTION OROPHARYNGEAL ONCE
Status: COMPLETED | OUTPATIENT
Start: 2022-12-05 | End: 2022-12-05

## 2022-12-05 RX ORDER — PEN NEEDLE, DIABETIC 32GX 5/32"
NEEDLE, DISPOSABLE MISCELLANEOUS 4 TIMES DAILY
COMMUNITY
Start: 2022-11-05

## 2022-12-05 RX ORDER — INSULIN ASPART 100 [IU]/ML
INJECTION, SOLUTION INTRAVENOUS; SUBCUTANEOUS
COMMUNITY
Start: 2022-12-03

## 2022-12-05 RX ORDER — ATORVASTATIN CALCIUM 80 MG/1
80 TABLET, FILM COATED ORAL
COMMUNITY
Start: 2022-11-05

## 2022-12-05 RX ORDER — IBUPROFEN 600 MG/1
600 TABLET ORAL 3 TIMES DAILY
Qty: 30 TABLET | Refills: 0 | Status: SHIPPED | OUTPATIENT
Start: 2022-12-05 | End: 2023-01-25 | Stop reason: ALTCHOICE

## 2022-12-05 RX ORDER — INSULIN DETEMIR 100 [IU]/ML
INJECTION, SOLUTION SUBCUTANEOUS
COMMUNITY
Start: 2022-10-14

## 2022-12-05 RX ORDER — ONDANSETRON 2 MG/ML
8 INJECTION INTRAMUSCULAR; INTRAVENOUS
Status: COMPLETED | OUTPATIENT
Start: 2022-12-05 | End: 2022-12-05

## 2022-12-05 RX ORDER — ERGOCALCIFEROL 1.25 MG/1
50000 CAPSULE ORAL
COMMUNITY
Start: 2022-11-11

## 2022-12-05 RX ORDER — MAG HYDROX/ALUMINUM HYD/SIMETH 200-200-20
30 SUSPENSION, ORAL (FINAL DOSE FORM) ORAL ONCE
Status: COMPLETED | OUTPATIENT
Start: 2022-12-05 | End: 2022-12-05

## 2022-12-05 RX ORDER — METFORMIN HYDROCHLORIDE 500 MG/1
1000 TABLET, EXTENDED RELEASE ORAL
COMMUNITY
Start: 2022-11-05

## 2022-12-05 RX ORDER — AMLODIPINE BESYLATE AND ATORVASTATIN CALCIUM 10; 20 MG/1; MG/1
1 TABLET, FILM COATED ORAL
COMMUNITY

## 2022-12-05 RX ORDER — ONDANSETRON 4 MG/1
4 TABLET, ORALLY DISINTEGRATING ORAL EVERY 12 HOURS PRN
Qty: 10 TABLET | Refills: 0 | Status: SHIPPED | OUTPATIENT
Start: 2022-12-05

## 2022-12-05 RX ADMIN — ONDANSETRON 8 MG: 2 INJECTION INTRAMUSCULAR; INTRAVENOUS at 01:12

## 2022-12-05 RX ADMIN — KETOROLAC TROMETHAMINE 30 MG: 30 INJECTION, SOLUTION INTRAMUSCULAR at 01:12

## 2022-12-05 RX ADMIN — ALUMINUM HYDROXIDE, MAGNESIUM HYDROXIDE, AND SIMETHICONE 30 ML: 200; 200; 20 SUSPENSION ORAL at 01:12

## 2022-12-05 RX ADMIN — LIDOCAINE HYDROCHLORIDE 15 ML: 20 SOLUTION ORAL; TOPICAL at 01:12

## 2022-12-05 NOTE — ED PROVIDER NOTES
"Encounter Date: 12/4/2022       History     Chief Complaint   Patient presents with    Chest Pain     PT C/O SHARP ANTERIOR CW PAIN AND NAUSEA. STATES SHE THOUGHT HER SUGAR WAS LOW AND CHECKED IT AT HOME. SP=511. DENIES ANY INJURY OR SOB AT THIS TIME.      37-year-old female presents to the emergency department complaining of sharp anterior chest wall pain and nausea for the past 6 hours.  She states she thought her "sugar was low" and checked it at home, but it was within normal limits.  She denies fever/chills/shortness of breath/vomiting.  She has a past medical history significant for anxiety disorder, depression, diabetes mellitus type 2, hypertension and migraine headache.  She also states she had a stroke several years ago which affected her right side, but symptoms have mostly resolved.    The history is provided by the patient. No  was used.   Review of patient's allergies indicates:   Allergen Reactions    Clindamycin Hives and Itching    Compazine [prochlorperazine edisylate] Anxiety    Fioricet [butalbital-acetaminophen-caff] Hives and Other (See Comments)     Reaction:  "Really bad yeast infections"    Metoclopramide hcl     Prochlorperazine     Reglan [metoclopramide] Hives and Rash     Past Medical History:   Diagnosis Date    Anxiety     Bladder infection     Carpal tunnel syndrome     Depression     Diabetes mellitus     Hypertension     Migraine headache     Pancreatitis     Stroke     UTI (lower urinary tract infection)      Past Surgical History:   Procedure Laterality Date    CHOLECYSTECTOMY      KNEE SURGERY   aug 2008    right knee    KNEE SURGERY      REDUCTION MAMMAPLASTY      Breast reduction     Family History   Problem Relation Age of Onset    Hypertension Mother     Hypertension Father     Hypertension Maternal Grandmother     Breast cancer Neg Hx     Colon cancer Neg Hx     Ovarian cancer Neg Hx      Social History     Tobacco Use    Smoking status: Former     Years: " 8.00     Types: Cigarettes    Smokeless tobacco: Never    Tobacco comments:     has not smoked in 1 month   Substance Use Topics    Alcohol use: No    Drug use: No     Review of Systems   Constitutional:  Negative for chills and fever.   Cardiovascular:  Positive for chest pain. Negative for palpitations and leg swelling.   Gastrointestinal:  Positive for abdominal pain (Epigastric) and nausea. Negative for anal bleeding, blood in stool, constipation, rectal pain and vomiting.   Genitourinary:  Negative for vaginal bleeding.   Neurological:  Negative for dizziness, seizures, light-headedness, numbness and headaches.   All other systems reviewed and are negative.    Physical Exam     Initial Vitals   BP Pulse Resp Temp SpO2   12/04/22 2338 12/04/22 2338 12/04/22 2338 12/04/22 2342 12/04/22 2338   (!) 178/88 103 (!) 28 98.2 °F (36.8 °C) 99 %      MAP       --                Physical Exam    Nursing note and vitals reviewed.  Constitutional: She is not diaphoretic. No distress.   HENT:   Head: Normocephalic and atraumatic.   Right Ear: External ear normal.   Left Ear: External ear normal.   Mouth/Throat: Oropharynx is clear and moist.   Eyes: Conjunctivae are normal.   Neck: Neck supple.   Normal range of motion.  Cardiovascular:  Normal rate and regular rhythm.           Abdominal: Abdomen is soft. Bowel sounds are normal. There is no abdominal tenderness (No abdominal tenderness to palpation).   Musculoskeletal:         General: Normal range of motion.      Cervical back: Normal range of motion and neck supple.     Neurological: She is alert. She has normal strength.   Skin: Skin is warm. Capillary refill takes less than 2 seconds.   Psychiatric: She has a normal mood and affect. Thought content normal.       ED Course   Procedures  Labs Reviewed   POCT CMP - Abnormal; Notable for the following components:       Result Value    ALT (SGPT), POC 52 (*)     AST (SGOT), POC 61 (*)     POC Glucose 168 (*)     All other  "components within normal limits   POCT LIVER PANEL - Abnormal; Notable for the following components:    ALT (SGPT), POC 53 (*)     AST (SGOT), POC 57 (*)     POC GGT 73 (*)     All other components within normal limits   TROPONIN ISTAT   POCT CBC   POCT CMP   POCT LIVER PANEL   POCT TROPONIN       EKG Readings: (Independently Interpreted)   Initial Reading: No STEMI. Rhythm: Sinus Tachycardia. Heart Rate: 106. Ectopy: No Ectopy. ST Segments: Normal ST Segments.     Imaging Results    None          Medications   aluminum-magnesium hydroxide-simethicone 200-200-20 mg/5 mL suspension 30 mL (has no administration in time range)     And   LIDOcaine HCl 2% oral solution 15 mL (has no administration in time range)   ketorolac injection 30 mg (has no administration in time range)   ondansetron injection 8 mg (has no administration in time range)     Medical Decision Making:   Initial Assessment:   37-year-old female presents to the emergency department complaining of sharp anterior chest wall pain and nausea for the past 6 hours.  She states she thought her "sugar was low" and checked it at home, but it was within normal limits.  She denies fever/chills/shortness of breath/vomiting.  ED Management:  EKG and troponin were within normal limits.  CMP was reassuring.  CBC shows decreased H/H from May of 2022, but patient denies symptoms of low hemoglobin.  She states she would like to follow-up with her PCP for outpatient evaluation of causes of anemia.  Chest discomfort was reproducible on exam and patient was given instructions for noncardiac chest discomfort.  She received GI cocktail and Toradol in the emergency department.  Discomfort resolved prior to discharge and patient was advised to follow-up with her primary care physician within the next week for re-evaluation/return to the emergency department if condition worsens.                        Clinical Impression:   Final diagnoses:  [R07.9] Chest pain        ED " Disposition Condition    Discharge Stable          ED Prescriptions       Medication Sig Dispense Start Date End Date Auth. Provider    ondansetron (ZOFRAN-ODT) 4 MG TbDL Take 1 tablet (4 mg total) by mouth every 12 (twelve) hours as needed. 10 tablet 12/5/2022 -- Jonathan Ramos MD    ibuprofen (ADVIL,MOTRIN) 600 MG tablet Take 1 tablet (600 mg total) by mouth 3 (three) times daily. 30 tablet 12/5/2022 -- Jonathan Ramos MD          Follow-up Information       Follow up With Specialties Details Why Contact Info    [facility] Schedule an appointment as soon as possible for a visit in 1 week For reevaluation [address] [place] [id number]  [phone]               Jonathan Ramos MD  12/05/22 0106

## 2023-01-25 ENCOUNTER — HOSPITAL ENCOUNTER (EMERGENCY)
Facility: HOSPITAL | Age: 38
Discharge: HOME | End: 2023-01-25
Attending: EMERGENCY MEDICINE
Payer: MEDICAID

## 2023-01-25 VITALS
RESPIRATION RATE: 18 BRPM | HEART RATE: 91 BPM | DIASTOLIC BLOOD PRESSURE: 98 MMHG | WEIGHT: 195 LBS | BODY MASS INDEX: 36.84 KG/M2 | OXYGEN SATURATION: 100 % | SYSTOLIC BLOOD PRESSURE: 142 MMHG | TEMPERATURE: 98 F

## 2023-01-25 DIAGNOSIS — T07.XXXA ABRASIONS OF MULTIPLE SITES: ICD-10-CM

## 2023-01-25 DIAGNOSIS — J30.2 SEASONAL ALLERGIC RHINITIS, UNSPECIFIED TRIGGER: Primary | ICD-10-CM

## 2023-01-25 LAB
B-HCG UR QL: NEGATIVE
CTP QC/QA: YES
CTP QC/QA: YES
INFLUENZA A ANTIGEN, POC: NEGATIVE
INFLUENZA B ANTIGEN, POC: NEGATIVE
POCT GLUCOSE: 177 MG/DL (ref 70–110)
SARS-COV-2 RDRP RESP QL NAA+PROBE: NEGATIVE

## 2023-01-25 PROCEDURE — 82962 GLUCOSE BLOOD TEST: CPT | Mod: ER

## 2023-01-25 PROCEDURE — 25000003 PHARM REV CODE 250: Mod: ER | Performed by: NURSE PRACTITIONER

## 2023-01-25 PROCEDURE — 81025 URINE PREGNANCY TEST: CPT | Mod: ER | Performed by: NURSE PRACTITIONER

## 2023-01-25 PROCEDURE — 96372 THER/PROPH/DIAG INJ SC/IM: CPT | Performed by: NURSE PRACTITIONER

## 2023-01-25 PROCEDURE — 63600175 PHARM REV CODE 636 W HCPCS: Mod: ER | Performed by: NURSE PRACTITIONER

## 2023-01-25 PROCEDURE — 87804 INFLUENZA ASSAY W/OPTIC: CPT | Mod: 59,ER

## 2023-01-25 PROCEDURE — 99284 EMERGENCY DEPT VISIT MOD MDM: CPT | Mod: 25,ER

## 2023-01-25 PROCEDURE — 87635 SARS-COV-2 COVID-19 AMP PRB: CPT | Mod: ER | Performed by: NURSE PRACTITIONER

## 2023-01-25 RX ORDER — SULINDAC 150 MG/1
150 TABLET ORAL 2 TIMES DAILY
Qty: 10 TABLET | Refills: 0 | Status: SHIPPED | OUTPATIENT
Start: 2023-01-25 | End: 2023-01-30

## 2023-01-25 RX ORDER — DEXAMETHASONE SODIUM PHOSPHATE 4 MG/ML
8 INJECTION, SOLUTION INTRA-ARTICULAR; INTRALESIONAL; INTRAMUSCULAR; INTRAVENOUS; SOFT TISSUE
Status: COMPLETED | OUTPATIENT
Start: 2023-01-25 | End: 2023-01-25

## 2023-01-25 RX ORDER — AMOXICILLIN AND CLAVULANATE POTASSIUM 875; 125 MG/1; MG/1
1 TABLET, FILM COATED ORAL
Status: COMPLETED | OUTPATIENT
Start: 2023-01-25 | End: 2023-01-25

## 2023-01-25 RX ORDER — NAPROXEN 250 MG/1
500 TABLET ORAL
Status: COMPLETED | OUTPATIENT
Start: 2023-01-25 | End: 2023-01-25

## 2023-01-25 RX ORDER — CETIRIZINE HYDROCHLORIDE 5 MG/1
5 TABLET ORAL
Status: COMPLETED | OUTPATIENT
Start: 2023-01-25 | End: 2023-01-25

## 2023-01-25 RX ORDER — AMOXICILLIN AND CLAVULANATE POTASSIUM 875; 125 MG/1; MG/1
1 TABLET, FILM COATED ORAL 2 TIMES DAILY
Qty: 14 TABLET | Refills: 0 | Status: SHIPPED | OUTPATIENT
Start: 2023-01-25

## 2023-01-25 RX ADMIN — NAPROXEN 500 MG: 250 TABLET ORAL at 11:01

## 2023-01-25 RX ADMIN — CETIRIZINE HYDROCHLORIDE 5 MG: 5 TABLET ORAL at 11:01

## 2023-01-25 RX ADMIN — DEXAMETHASONE SODIUM PHOSPHATE 8 MG: 4 INJECTION, SOLUTION INTRA-ARTICULAR; INTRALESIONAL; INTRAMUSCULAR; INTRAVENOUS; SOFT TISSUE at 11:01

## 2023-01-25 RX ADMIN — AMOXICILLIN AND CLAVULANATE POTASSIUM 1 TABLET: 875; 125 TABLET, FILM COATED ORAL at 11:01

## 2023-01-26 NOTE — DISCHARGE INSTRUCTIONS
Claritin, Allegra, or Zyrtec over the counter (without decongestants).      Flonase as directed on package.       You have been prescribed clinoril (sulindac), an anti-inflammatory.  Take this medication whether you feel you need it or not.  Do not take ibuprofen, naproxen or other NSAID's medications while taking this medication.    Return to the Emergency Department for any worsening, change in condition, or any emergent concerns.

## 2023-01-26 NOTE — ED PROVIDER NOTES
"Encounter Date: 1/25/2023    SCRIBE #1 NOTE: I, Noni Chicas, am scribing for, and in the presence of,  Kenneth Che DNP. I have scribed the following portions of the note - Other sections scribed: HPI, ROS, PE.     History     Chief Complaint   Patient presents with    Cold Like Symptoms     Pt reports face and eyes itching, Migraine, left ear fullness, cough and sore throat for weeks. Pt states she has taken Benadryl, Claritin and other OTC meds. Pt reports hx of allergies but this different.     CC: Seasonal Allergies    Samantha Sr is a 37 y.o. female who presents to the ED with c/o seasonal allergies for several weeks, but worsened over the last few days. Patient reports sneezing, sinus pressure, eye pain, left ear pain, and generalized skin itching. Recounts taking Benadryl for symptoms but states "it just made symptoms so much worse". Patient has PMHx of HTN and DM. No other modifying factors. No other associated symptoms reported.       The history is provided by the patient. No  was used.   Review of patient's allergies indicates:   Allergen Reactions    Clindamycin Hives and Itching    Compazine [prochlorperazine edisylate] Anxiety    Fioricet [butalbital-acetaminophen-caff] Hives and Other (See Comments)     Reaction:  "Really bad yeast infections"    Metoclopramide hcl     Prochlorperazine     Reglan [metoclopramide] Hives and Rash     Past Medical History:   Diagnosis Date    Anxiety     Bladder infection     Carpal tunnel syndrome     Depression     Diabetes mellitus     Hypertension     Migraine headache     Pancreatitis     Stroke     UTI (lower urinary tract infection)      Past Surgical History:   Procedure Laterality Date    CHOLECYSTECTOMY      KNEE SURGERY   aug 2008    right knee    KNEE SURGERY      REDUCTION MAMMAPLASTY      Breast reduction     Family History   Problem Relation Age of Onset    Hypertension Mother     Hypertension Father     Hypertension Maternal " Grandmother     Breast cancer Neg Hx     Colon cancer Neg Hx     Ovarian cancer Neg Hx      Social History     Tobacco Use    Smoking status: Former     Years: 8.00     Types: Cigarettes    Smokeless tobacco: Never    Tobacco comments:     has not smoked in 1 month   Substance Use Topics    Alcohol use: No    Drug use: No     Review of Systems   Constitutional:  Negative for chills, fatigue and fever.   HENT:  Positive for ear pain, sinus pressure and sneezing. Negative for congestion, ear discharge, postnasal drip, rhinorrhea, sore throat and voice change.    Eyes:  Positive for pain. Negative for discharge and itching.   Respiratory:  Negative for cough, shortness of breath and wheezing.    Cardiovascular:  Negative for chest pain, palpitations and leg swelling.   Gastrointestinal:  Negative for abdominal pain, constipation, diarrhea, nausea and vomiting.   Endocrine: Negative for polydipsia, polyphagia and polyuria.   Genitourinary:  Negative for dysuria, frequency, hematuria, urgency, vaginal bleeding, vaginal discharge and vaginal pain.   Musculoskeletal:  Negative for arthralgias and myalgias.   Skin:  Negative for rash and wound.        (+) itching   Allergic/Immunologic: Positive for environmental allergies.   Neurological:  Negative for dizziness, seizures, syncope, weakness and numbness.   Hematological:  Negative for adenopathy. Does not bruise/bleed easily.   Psychiatric/Behavioral:  Negative for self-injury and suicidal ideas. The patient is not nervous/anxious.      Physical Exam     Initial Vitals [01/25/23 2039]   BP Pulse Resp Temp SpO2   (!) 154/106 97 19 98.2 °F (36.8 °C) 100 %      MAP       --         Physical Exam    Nursing note and vitals reviewed.  Constitutional: She appears well-developed and well-nourished.   HENT:   Head: Normocephalic and atraumatic.   Right Ear: Hearing, tympanic membrane, external ear and ear canal normal.   Left Ear: Hearing, tympanic membrane, external ear and ear  canal normal.   Nose: No mucosal edema or rhinorrhea. No epistaxis. Right sinus exhibits maxillary sinus tenderness and frontal sinus tenderness. Left sinus exhibits maxillary sinus tenderness and frontal sinus tenderness.   Mouth/Throat: Uvula is midline, oropharynx is clear and moist and mucous membranes are normal. No oral lesions. Normal dentition.   Eyes: Conjunctivae and EOM are normal. Pupils are equal, round, and reactive to light. Right eye exhibits no discharge. Left eye exhibits no discharge.   Neck:   Normal range of motion.  Cardiovascular:  Regular rhythm, S1 normal, S2 normal and normal heart sounds.     Exam reveals no gallop.       No murmur heard.  Pulmonary/Chest: Effort normal and breath sounds normal. No respiratory distress. She has no decreased breath sounds. She has no wheezes. She has no rhonchi. She has no rales.   Abdominal: She exhibits no distension.   Musculoskeletal:         General: Normal range of motion.      Cervical back: Normal range of motion.     Neurological: She is alert and oriented to person, place, and time.   Skin: Skin is dry. Capillary refill takes less than 2 seconds.   Abrasions to bilateral forearms with no sign of infection. Unable to visualize other abrasions due to clothes.       ED Course   Procedures  Labs Reviewed   POCT GLUCOSE - Abnormal; Notable for the following components:       Result Value    POCT Glucose 177 (*)     All other components within normal limits   SARS-COV-2 RDRP GENE    Narrative:     This test utilizes isothermal nucleic acid amplification technology to detect the SARS-CoV-2 RdRp nucleic acid segment. The analytical sensitivity (limit of detection) is 500 copies/swab.     A POSITIVE result is indicative of the presence of SARS-CoV-2 RNA; clinical correlation with patient history and other diagnostic information is necessary to determine patient infection status.    A NEGATIVE result means that SARS-CoV-2 nucleic acids are not present above  the limit of detection. A NEGATIVE result should be treated as presumptive. It does not rule out the possibility of COVID-19 and should not be the sole basis for treatment decisions. If COVID-19 is strongly suspected based on clinical and exposure history, re-testing using an alternate molecular assay should be considered.     This test is only for use under the Food and Drug Administration s Emergency Use Authorization (EUA).     Commercial kits are provided by [employer]. Performance characteristics of the EUA have been independently verified by [facility] Department of Pathology and Laboratory Medicine.   _________________________________________________________________   The authorized Fact Sheet for Healthcare Providers and the authorized Fact Sheet for Patients of the ID NOW COVID-19 are available on the FDA website:    https://www.fda.gov/media/299995/download      https://www.fda.gov/media/288564/download      POCT URINE PREGNANCY   POCT RAPID INFLUENZA A/B   POCT GLUCOSE MONITORING CONTINUOUS          Imaging Results    None          Medications   dexAMETHasone injection 8 mg (8 mg Intramuscular Given 1/25/23 2318)   cetirizine tablet 5 mg (5 mg Oral Given 1/25/23 2311)   amoxicillin-clavulanate 875-125mg per tablet 1 tablet (1 tablet Oral Given 1/25/23 2312)   naproxen tablet 500 mg (500 mg Oral Given 1/25/23 2312)     Medical Decision Making:   History:   Old Medical Records: I decided to obtain old medical records.  Initial Assessment:   This is an emergent evaluation of a 37 y.o. female who presents with acute seasonal allergies for several days. Associated symptoms include sneezing, sinus pressure, eye pain, left ear pain, and generalized skin itching. Patient reports history of HTN and DM. On exam, there is frontal and maxillary sinus tenderness. UPT is negative. Viral swabs ordered. Patient was given Amoxicillin, Naproxen, Dexamethasone, and Cetrizine in the ED.    Differential  Diagnosis:   Differential diagnosis include but are not limited to: COVID-19, Influenza A/B, Viral Illness, Seasonal allergiesllergic reaction  Clinical Tests:   Lab Tests: Ordered and Reviewed   Plan: antihistamines, nasal fluticasone, steroid inj in ED, f/u with pcp. AS pt has significant sinus pressure will start on abx (reports as excruicating pain, which is a criteria for ABRS and abx therapy.  Naproxen in ED, clinoril at home for pain relief.     Scribe Attestation:   Scribe #1: I performed the above scribed service and the documentation accurately describes the services I performed. I attest to the accuracy of the note.        I, Scribe attestation: I, Kenneth Che DNP ACNP-BC FNP-C ENP-C,  personally performed the services described in this documentation. All medical record entries made by the scribe were at my direction and in my presence.  I have reviewed the chart and agree that the record reflects my personal performance and is accurate and complete. , personally performed the services described in this documentation. All medical record entries made by the scribe were at my direction and in my presence. I have reviewed the chart and agree that the record reflects my personal performance and is accurate and complete.    ED Course as of 01/26/23 1150   Wed Jan 25, 2023 2258 Preg Test, Ur: Negative [VC]   2258 Influenza B Ag: negative [VC]   2258 Inflenza A Ag: negative [VC]   2258 SARS-CoV-2 RNA, Amplification, Qual: Negative [VC]   2258 BP(!): 154/106 [VC]   2258 Temp: 98.2 °F (36.8 °C) [VC]   2258 Temp src: Oral [VC]   2258 Resp: 19 [VC]   2258 Pulse: 97 [VC]   2258 SpO2: 100 % [VC]      ED Course User Index  [VC] Kenneth Che DNP                 Clinical Impression:   Final diagnoses:  [J30.2] Seasonal allergic rhinitis, unspecified trigger (Primary)  [T07.XXXA] Abrasions of multiple sites       Vital signs at the time of disposition were:  BP (!) 142/98 (BP Location: Right arm, Patient  Position: Sitting)   Pulse 91   Temp 98.2 °F (36.8 °C) (Oral)   Resp 18   Wt 88.5 kg (195 lb)   LMP 01/23/2023 (Approximate)   SpO2 100%   BMI 36.84 kg/m²       See AVS for additional recommendations. Medications listed herein were prescribed after reviewing the patient's allergies, medication list, history, most recent laboratories as available.  Referrals below were provided after reviewing the patient's previous medical providers. She understands she  should return for any worsening or changes in condition.  Prior to discharge the patient was asked if she  had any additional concerns or complaints and she declined. The patient was given an opportunity to ask questions and all were answered to her satisfaction.     ED Disposition Condition    Discharge Stable          ED Prescriptions       Medication Sig Dispense Start Date End Date Auth. Provider    amoxicillin-clavulanate 875-125mg (AUGMENTIN) 875-125 mg per tablet Take 1 tablet by mouth 2 (two) times daily. 14 tablet 1/25/2023 -- Kenneth Che DNP    sulindac (CLINORIL) 150 MG tablet Take 1 tablet (150 mg total) by mouth 2 (two) times daily. for 5 days 10 tablet 1/25/2023 1/30/2023 Kenneth Che DNP          Follow-up Information       Follow up With Specialties Details Why Contact Info    [facility] Schedule an appointment as soon as possible for a visit   [address] [address]  [phone]               Kenneth Che DNP  01/26/23 1154

## 2023-03-28 DIAGNOSIS — M25.552 LEFT HIP PAIN: Primary | ICD-10-CM
